# Patient Record
Sex: FEMALE | Race: WHITE | NOT HISPANIC OR LATINO | Employment: OTHER | URBAN - METROPOLITAN AREA
[De-identification: names, ages, dates, MRNs, and addresses within clinical notes are randomized per-mention and may not be internally consistent; named-entity substitution may affect disease eponyms.]

---

## 2017-12-13 LAB
INFLUENZA A ANTIGEN, POC: NEGATIVE
INFLUENZA B ANTIGEN, POC: NEGATIVE
RAPID GROUP A STREP (OHS): POSITIVE

## 2019-10-09 ENCOUNTER — HISTORICAL (OUTPATIENT)
Dept: ADMINISTRATIVE | Facility: HOSPITAL | Age: 49
End: 2019-10-09

## 2020-01-13 LAB
INFLUENZA A ANTIGEN, POC: NEGATIVE
INFLUENZA B ANTIGEN, POC: NEGATIVE
RAPID GROUP A STREP (OHS): NEGATIVE

## 2020-10-05 ENCOUNTER — HISTORICAL (OUTPATIENT)
Dept: ADMINISTRATIVE | Facility: HOSPITAL | Age: 50
End: 2020-10-05

## 2021-01-31 ENCOUNTER — HISTORICAL (OUTPATIENT)
Dept: URGENT CARE | Facility: CLINIC | Age: 51
End: 2021-01-31

## 2021-01-31 LAB
INFLUENZA A ANTIGEN, POC: NEGATIVE
INFLUENZA B ANTIGEN, POC: NEGATIVE

## 2021-05-05 LAB — CRC RECOMMENDATION EXT: NORMAL

## 2021-05-07 ENCOUNTER — HISTORICAL (OUTPATIENT)
Dept: ADMINISTRATIVE | Facility: HOSPITAL | Age: 51
End: 2021-05-07

## 2021-06-10 ENCOUNTER — HISTORICAL (OUTPATIENT)
Dept: ADMINISTRATIVE | Facility: HOSPITAL | Age: 51
End: 2021-06-10

## 2021-06-10 LAB
ABS NEUT (OLG): 1.65 X10(3)/MCL (ref 2.1–9.2)
ALBUMIN SERPL-MCNC: 3.8 GM/DL (ref 3.5–5)
ALBUMIN/GLOB SERPL: 1.1 RATIO (ref 1.1–2)
ALP SERPL-CCNC: 68 UNIT/L (ref 40–150)
ALT SERPL-CCNC: 14 UNIT/L (ref 0–55)
AST SERPL-CCNC: 18 UNIT/L (ref 5–34)
BASOPHILS # BLD AUTO: 0 X10(3)/MCL (ref 0–0.2)
BASOPHILS NFR BLD AUTO: 0.9 %
BILIRUB SERPL-MCNC: 0.5 MG/DL
BILIRUBIN DIRECT+TOT PNL SERPL-MCNC: 0.2 MG/DL (ref 0–0.5)
BILIRUBIN DIRECT+TOT PNL SERPL-MCNC: 0.3 MG/DL (ref 0–0.8)
BUN SERPL-MCNC: 13.4 MG/DL (ref 9.8–20.1)
CALCIUM SERPL-MCNC: 9 MG/DL (ref 8.4–10.2)
CEA SERPL-MCNC: <1.73 NG/ML (ref 0–3)
CHLORIDE SERPL-SCNC: 109 MMOL/L (ref 98–107)
CO2 SERPL-SCNC: 25 MMOL/L (ref 22–29)
CREAT SERPL-MCNC: 1.01 MG/DL (ref 0.55–1.02)
EOSINOPHIL # BLD AUTO: 0 X10(3)/MCL (ref 0–0.9)
EOSINOPHIL NFR BLD AUTO: 0.9 %
ERYTHROCYTE [DISTWIDTH] IN BLOOD BY AUTOMATED COUNT: 20.4 % (ref 11.5–17)
FERRITIN SERPL-MCNC: 6.72 NG/ML (ref 4.63–204)
GLOBULIN SER-MCNC: 3.4 GM/DL (ref 2.4–3.5)
GLUCOSE SERPL-MCNC: 94 MG/DL (ref 74–100)
HCT VFR BLD AUTO: 34.6 % (ref 37–47)
HGB BLD-MCNC: 10.5 GM/DL (ref 12–16)
IRON SATN MFR SERPL: 21 % (ref 20–50)
IRON SERPL-MCNC: 71 UG/DL (ref 50–170)
LYMPHOCYTES # BLD AUTO: 1.3 X10(3)/MCL (ref 0.6–4.6)
LYMPHOCYTES NFR BLD AUTO: 39.9 %
MCH RBC QN AUTO: 23.4 PG (ref 27–31)
MCHC RBC AUTO-ENTMCNC: 30.3 GM/DL (ref 33–36)
MCV RBC AUTO: 77.2 FL (ref 80–94)
MONOCYTES # BLD AUTO: 0.3 X10(3)/MCL (ref 0.1–1.3)
MONOCYTES NFR BLD AUTO: 8.9 %
NEUTROPHILS # BLD AUTO: 1.6 X10(3)/MCL (ref 2.1–9.2)
NEUTROPHILS NFR BLD AUTO: 49.1 %
PLATELET # BLD AUTO: 215 X10(3)/MCL (ref 130–400)
PMV BLD AUTO: 9.3 FL (ref 9.4–12.4)
POTASSIUM SERPL-SCNC: 4.2 MMOL/L (ref 3.5–5.1)
PROT SERPL-MCNC: 7.2 GM/DL (ref 6.4–8.3)
RBC # BLD AUTO: 4.48 X10(6)/MCL (ref 4.2–5.4)
SODIUM SERPL-SCNC: 142 MMOL/L (ref 136–145)
TIBC SERPL-MCNC: 273 UG/DL (ref 70–310)
TIBC SERPL-MCNC: 344 UG/DL (ref 250–450)
TRANSFERRIN SERPL-MCNC: 317 MG/DL (ref 180–382)
WBC # SPEC AUTO: 3.4 X10(3)/MCL (ref 4.5–11.5)

## 2021-06-11 ENCOUNTER — HISTORICAL (OUTPATIENT)
Dept: INFUSION THERAPY | Facility: HOSPITAL | Age: 51
End: 2021-06-11

## 2021-06-15 ENCOUNTER — HISTORICAL (OUTPATIENT)
Dept: ADMINISTRATIVE | Facility: HOSPITAL | Age: 51
End: 2021-06-15

## 2021-06-16 ENCOUNTER — HISTORICAL (OUTPATIENT)
Dept: INFUSION THERAPY | Facility: HOSPITAL | Age: 51
End: 2021-06-16

## 2021-06-17 ENCOUNTER — HISTORICAL (OUTPATIENT)
Dept: ADMINISTRATIVE | Facility: HOSPITAL | Age: 51
End: 2021-06-17

## 2021-06-18 ENCOUNTER — HISTORICAL (OUTPATIENT)
Dept: INFUSION THERAPY | Facility: HOSPITAL | Age: 51
End: 2021-06-18

## 2021-06-22 ENCOUNTER — HISTORICAL (OUTPATIENT)
Dept: ADMINISTRATIVE | Facility: HOSPITAL | Age: 51
End: 2021-06-22

## 2021-06-22 LAB
ABS NEUT (OLG): 2.9 X10(3)/MCL (ref 2.1–9.2)
ALBUMIN SERPL-MCNC: 3.8 GM/DL (ref 3.5–5)
ALBUMIN/GLOB SERPL: 1.2 RATIO (ref 1.1–2)
ALP SERPL-CCNC: 70 UNIT/L (ref 40–150)
ALT SERPL-CCNC: 51 UNIT/L (ref 0–55)
AST SERPL-CCNC: 21 UNIT/L (ref 5–34)
BASOPHILS # BLD AUTO: 0 X10(3)/MCL (ref 0–0.2)
BASOPHILS NFR BLD AUTO: 0 %
BILIRUB SERPL-MCNC: 0.5 MG/DL
BILIRUBIN DIRECT+TOT PNL SERPL-MCNC: 0.2 MG/DL (ref 0–0.5)
BILIRUBIN DIRECT+TOT PNL SERPL-MCNC: 0.3 MG/DL (ref 0–0.8)
BUN SERPL-MCNC: 16.1 MG/DL (ref 9.8–20.1)
CALCIUM SERPL-MCNC: 9.1 MG/DL (ref 8.4–10.2)
CHLORIDE SERPL-SCNC: 104 MMOL/L (ref 98–107)
CO2 SERPL-SCNC: 26 MMOL/L (ref 22–29)
CREAT SERPL-MCNC: 0.83 MG/DL (ref 0.55–1.02)
EOSINOPHIL # BLD AUTO: 0.1 X10(3)/MCL (ref 0–0.9)
EOSINOPHIL NFR BLD AUTO: 2.8 %
ERYTHROCYTE [DISTWIDTH] IN BLOOD BY AUTOMATED COUNT: 22.3 % (ref 11.5–17)
GLOBULIN SER-MCNC: 3.3 GM/DL (ref 2.4–3.5)
GLUCOSE SERPL-MCNC: 109 MG/DL (ref 74–100)
HCT VFR BLD AUTO: 36.7 % (ref 37–47)
HGB BLD-MCNC: 11.7 GM/DL (ref 12–16)
LYMPHOCYTES # BLD AUTO: 1.9 X10(3)/MCL (ref 0.6–4.6)
LYMPHOCYTES NFR BLD AUTO: 37.1 %
MCH RBC QN AUTO: 23.8 PG (ref 27–31)
MCHC RBC AUTO-ENTMCNC: 31.9 GM/DL (ref 33–36)
MCV RBC AUTO: 74.7 FL (ref 80–94)
MONOCYTES # BLD AUTO: 0.1 X10(3)/MCL (ref 0.1–1.3)
MONOCYTES NFR BLD AUTO: 2.2 %
NEUTROPHILS # BLD AUTO: 2.9 X10(3)/MCL (ref 2.1–9.2)
NEUTROPHILS NFR BLD AUTO: 57.9 %
PLATELET # BLD AUTO: 175 X10(3)/MCL (ref 130–400)
PMV BLD AUTO: 9.1 FL (ref 9.4–12.4)
POTASSIUM SERPL-SCNC: 4.7 MMOL/L (ref 3.5–5.1)
PROT SERPL-MCNC: 7.1 GM/DL (ref 6.4–8.3)
RBC # BLD AUTO: 4.91 X10(6)/MCL (ref 4.2–5.4)
SODIUM SERPL-SCNC: 135 MMOL/L (ref 136–145)
WBC # SPEC AUTO: 5 X10(3)/MCL (ref 4.5–11.5)

## 2021-06-28 ENCOUNTER — HISTORICAL (OUTPATIENT)
Dept: URGENT CARE | Facility: CLINIC | Age: 51
End: 2021-06-28

## 2021-06-28 LAB
BILIRUB SERPL-MCNC: NEGATIVE MG/DL
BLOOD URINE, POC: NORMAL
CLARITY, POC UA: NORMAL
COLOR, POC UA: YELLOW
GLUCOSE UR QL STRIP: NEGATIVE
KETONES UR QL STRIP: NORMAL
LEUKOCYTE EST, POC UA: NORMAL
NITRITE, POC UA: NEGATIVE
PH, POC UA: 6
PROTEIN, POC: NORMAL
SPECIFIC GRAVITY, POC UA: 1.02
UROBILINOGEN, POC UA: NORMAL

## 2021-06-30 ENCOUNTER — HISTORICAL (OUTPATIENT)
Dept: INFUSION THERAPY | Facility: HOSPITAL | Age: 51
End: 2021-06-30

## 2021-06-30 LAB
ABS NEUT (OLG): 0.65 X10(3)/MCL (ref 2.1–9.2)
ANION GAP SERPL CALC-SCNC: 19 MMOL/L
BASOPHILS # BLD AUTO: 0 X10(3)/MCL (ref 0–0.2)
BASOPHILS NFR BLD AUTO: 0.6 %
BUN SERPL-MCNC: 9 MG/DL (ref 8–26)
CHLORIDE SERPL-SCNC: 104 MMOL/L (ref 98–109)
CREAT SERPL-MCNC: 0.7 MG/DL (ref 0.6–1.3)
EOSINOPHIL # BLD AUTO: 0 X10(3)/MCL (ref 0–0.9)
EOSINOPHIL NFR BLD AUTO: 1.1 %
ERYTHROCYTE [DISTWIDTH] IN BLOOD BY AUTOMATED COUNT: 24.3 % (ref 11.5–17)
GLUCOSE SERPL-MCNC: 103 MG/DL (ref 70–105)
HCT VFR BLD AUTO: 31.6 % (ref 37–47)
HCT VFR BLD CALC: 32 % (ref 38–51)
HGB BLD-MCNC: 10.9 MG/DL (ref 12–17)
HGB BLD-MCNC: 9.9 GM/DL (ref 12–16)
LYMPHOCYTES # BLD AUTO: 0.9 X10(3)/MCL (ref 0.6–4.6)
LYMPHOCYTES NFR BLD AUTO: 51.4 %
MCH RBC QN AUTO: 24.8 PG (ref 27–31)
MCHC RBC AUTO-ENTMCNC: 31.3 GM/DL (ref 33–36)
MCV RBC AUTO: 79 FL (ref 80–94)
MONOCYTES # BLD AUTO: 0.2 X10(3)/MCL (ref 0.1–1.3)
MONOCYTES NFR BLD AUTO: 10.5 %
NEUTROPHILS # BLD AUTO: 0.6 X10(3)/MCL (ref 2.1–9.2)
NEUTROPHILS NFR BLD AUTO: 35.8 %
PLATELET # BLD AUTO: 96 X10(3)/MCL (ref 130–400)
PMV BLD AUTO: 8.6 FL (ref 9.4–12.4)
POC IONIZED CALCIUM: 1.19 MMOL/L (ref 1.12–1.32)
POC TCO2: 24 MMOL/L (ref 24–29)
POTASSIUM BLD-SCNC: 3.6 MMOL/L (ref 3.5–4.9)
RBC # BLD AUTO: 4 X10(6)/MCL (ref 4.2–5.4)
SODIUM BLD-SCNC: 142 MMOL/L (ref 138–146)
WBC # SPEC AUTO: 1.8 X10(3)/MCL (ref 4.5–11.5)

## 2021-07-01 ENCOUNTER — HISTORICAL (OUTPATIENT)
Dept: INFUSION THERAPY | Facility: HOSPITAL | Age: 51
End: 2021-07-01

## 2021-07-01 LAB
ABS NEUT (OLG): 1.81 X10(3)/MCL (ref 2.1–9.2)
BASOPHILS # BLD AUTO: 0 X10(3)/MCL (ref 0–0.2)
BASOPHILS NFR BLD AUTO: 0.3 %
EOSINOPHIL # BLD AUTO: 0.1 X10(3)/MCL (ref 0–0.9)
EOSINOPHIL NFR BLD AUTO: 1.5 %
ERYTHROCYTE [DISTWIDTH] IN BLOOD BY AUTOMATED COUNT: 25.1 % (ref 11.5–17)
HCT VFR BLD AUTO: 32.3 % (ref 37–47)
HGB BLD-MCNC: 10 GM/DL (ref 12–16)
LYMPHOCYTES # BLD AUTO: 1.4 X10(3)/MCL (ref 0.6–4.6)
LYMPHOCYTES NFR BLD AUTO: 36 %
MCH RBC QN AUTO: 24.8 PG (ref 27–31)
MCHC RBC AUTO-ENTMCNC: 31 GM/DL (ref 33–36)
MCV RBC AUTO: 80.1 FL (ref 80–94)
MONOCYTES # BLD AUTO: 0.3 X10(3)/MCL (ref 0.1–1.3)
MONOCYTES NFR BLD AUTO: 8.5 %
NEUTROPHILS # BLD AUTO: 1.8 X10(3)/MCL (ref 2.1–9.2)
NEUTROPHILS NFR BLD AUTO: 45.2 %
PLATELET # BLD AUTO: 96 X10(3)/MCL (ref 130–400)
PMV BLD AUTO: 8.7 FL (ref 9.4–12.4)
RBC # BLD AUTO: 4.03 X10(6)/MCL (ref 4.2–5.4)
WBC # SPEC AUTO: 4 X10(3)/MCL (ref 4.5–11.5)

## 2021-07-03 ENCOUNTER — HISTORICAL (OUTPATIENT)
Dept: INFUSION THERAPY | Facility: HOSPITAL | Age: 51
End: 2021-07-03

## 2021-07-06 ENCOUNTER — HISTORICAL (OUTPATIENT)
Dept: ADMINISTRATIVE | Facility: HOSPITAL | Age: 51
End: 2021-07-06

## 2021-07-06 LAB
ABS NEUT (OLG): 6.34 X10(3)/MCL (ref 2.1–9.2)
ALBUMIN SERPL-MCNC: 3.7 GM/DL (ref 3.5–5)
ALBUMIN/GLOB SERPL: 1.2 RATIO (ref 1.1–2)
ALP SERPL-CCNC: 100 UNIT/L (ref 40–150)
ALT SERPL-CCNC: 61 UNIT/L (ref 0–55)
AST SERPL-CCNC: 29 UNIT/L (ref 5–34)
BASOPHILS # BLD AUTO: 0 X10(3)/MCL (ref 0–0.2)
BASOPHILS NFR BLD AUTO: 0.1 %
BILIRUB SERPL-MCNC: 0.6 MG/DL
BILIRUBIN DIRECT+TOT PNL SERPL-MCNC: 0.2 MG/DL (ref 0–0.5)
BILIRUBIN DIRECT+TOT PNL SERPL-MCNC: 0.4 MG/DL (ref 0–0.8)
BUN SERPL-MCNC: 18.5 MG/DL (ref 9.8–20.1)
CALCIUM SERPL-MCNC: 8.8 MG/DL (ref 8.4–10.2)
CHLORIDE SERPL-SCNC: 105 MMOL/L (ref 98–107)
CO2 SERPL-SCNC: 23 MMOL/L (ref 22–29)
CREAT SERPL-MCNC: 0.82 MG/DL (ref 0.55–1.02)
EOSINOPHIL # BLD AUTO: 0.1 X10(3)/MCL (ref 0–0.9)
EOSINOPHIL NFR BLD AUTO: 0.8 %
ERYTHROCYTE [DISTWIDTH] IN BLOOD BY AUTOMATED COUNT: 26.1 % (ref 11.5–17)
GLOBULIN SER-MCNC: 3 GM/DL (ref 2.4–3.5)
GLUCOSE SERPL-MCNC: 119 MG/DL (ref 74–100)
HCT VFR BLD AUTO: 35.9 % (ref 37–47)
HGB BLD-MCNC: 11.5 GM/DL (ref 12–16)
LYMPHOCYTES # BLD AUTO: 1.9 X10(3)/MCL (ref 0.6–4.6)
LYMPHOCYTES NFR BLD AUTO: 21.8 %
MCH RBC QN AUTO: 25.3 PG (ref 27–31)
MCHC RBC AUTO-ENTMCNC: 32 GM/DL (ref 33–36)
MCV RBC AUTO: 79.1 FL (ref 80–94)
MONOCYTES # BLD AUTO: 0.3 X10(3)/MCL (ref 0.1–1.3)
MONOCYTES NFR BLD AUTO: 3 %
NEUTROPHILS # BLD AUTO: 6.3 X10(3)/MCL (ref 2.1–9.2)
NEUTROPHILS NFR BLD AUTO: 73.5 %
PLATELET # BLD AUTO: 170 X10(3)/MCL (ref 130–400)
PMV BLD AUTO: 9.4 FL (ref 9.4–12.4)
POTASSIUM SERPL-SCNC: 4.4 MMOL/L (ref 3.5–5.1)
PROT SERPL-MCNC: 6.7 GM/DL (ref 6.4–8.3)
RBC # BLD AUTO: 4.54 X10(6)/MCL (ref 4.2–5.4)
SODIUM SERPL-SCNC: 137 MMOL/L (ref 136–145)
WBC # SPEC AUTO: 8.6 X10(3)/MCL (ref 4.5–11.5)

## 2021-07-14 ENCOUNTER — HISTORICAL (OUTPATIENT)
Dept: INFUSION THERAPY | Facility: HOSPITAL | Age: 51
End: 2021-07-14

## 2021-07-14 LAB
ABS NEUT (OLG): 3.84 X10(3)/MCL (ref 2.1–9.2)
ANION GAP SERPL CALC-SCNC: 19 MMOL/L
BASOPHILS # BLD AUTO: 0 X10(3)/MCL (ref 0–0.2)
BASOPHILS NFR BLD AUTO: 0.3 %
BUN SERPL-MCNC: 6 MG/DL (ref 8–26)
CHLORIDE SERPL-SCNC: 104 MMOL/L (ref 98–109)
CREAT SERPL-MCNC: 0.9 MG/DL (ref 0.6–1.3)
EOSINOPHIL # BLD AUTO: 0 X10(3)/MCL (ref 0–0.9)
EOSINOPHIL NFR BLD AUTO: 0.3 %
ERYTHROCYTE [DISTWIDTH] IN BLOOD BY AUTOMATED COUNT: 29.5 % (ref 11.5–17)
GLUCOSE SERPL-MCNC: 130 MG/DL (ref 70–105)
HCT VFR BLD AUTO: 32.9 % (ref 37–47)
HCT VFR BLD CALC: 33 % (ref 38–51)
HGB BLD-MCNC: 10.4 GM/DL (ref 12–16)
HGB BLD-MCNC: 11.2 MG/DL (ref 12–17)
LYMPHOCYTES # BLD AUTO: 1.8 X10(3)/MCL (ref 0.6–4.6)
LYMPHOCYTES NFR BLD AUTO: 22.5 %
MCH RBC QN AUTO: 26 PG (ref 27–31)
MCHC RBC AUTO-ENTMCNC: 31.6 GM/DL (ref 33–36)
MCV RBC AUTO: 82.3 FL (ref 80–94)
MONOCYTES # BLD AUTO: 0.5 X10(3)/MCL (ref 0.1–1.3)
MONOCYTES NFR BLD AUTO: 6.8 %
NEUTROPHILS # BLD AUTO: 3.8 X10(3)/MCL (ref 2.1–9.2)
NEUTROPHILS NFR BLD AUTO: 48.1 %
PLATELET # BLD AUTO: 87 X10(3)/MCL (ref 130–400)
PMV BLD AUTO: 8.2 FL (ref 9.4–12.4)
POC IONIZED CALCIUM: 1.25 MMOL/L (ref 1.12–1.32)
POC TCO2: 25 MMOL/L (ref 24–29)
POTASSIUM BLD-SCNC: 4.4 MMOL/L (ref 3.5–4.9)
RBC # BLD AUTO: 4 X10(6)/MCL (ref 4.2–5.4)
SODIUM BLD-SCNC: 143 MMOL/L (ref 138–146)
WBC # SPEC AUTO: 8 X10(3)/MCL (ref 4.5–11.5)

## 2021-07-21 ENCOUNTER — HISTORICAL (OUTPATIENT)
Dept: INFUSION THERAPY | Facility: HOSPITAL | Age: 51
End: 2021-07-21

## 2021-07-21 LAB
ABS NEUT (OLG): 2.35 X10(3)/MCL (ref 2.1–9.2)
ANION GAP SERPL CALC-SCNC: 17 MMOL/L
BASOPHILS # BLD AUTO: 0 X10(3)/MCL (ref 0–0.2)
BASOPHILS NFR BLD AUTO: 0.6 %
BUN SERPL-MCNC: 14 MG/DL (ref 8–26)
CHLORIDE SERPL-SCNC: 108 MMOL/L (ref 98–109)
CREAT SERPL-MCNC: 0.9 MG/DL (ref 0.6–1.3)
EOSINOPHIL # BLD AUTO: 0 X10(3)/MCL (ref 0–0.9)
EOSINOPHIL NFR BLD AUTO: 0.6 %
ERYTHROCYTE [DISTWIDTH] IN BLOOD BY AUTOMATED COUNT: 28.9 % (ref 11.5–17)
GLUCOSE SERPL-MCNC: 107 MG/DL (ref 70–105)
HCT VFR BLD AUTO: 34.5 % (ref 37–47)
HCT VFR BLD CALC: 36 % (ref 38–51)
HGB BLD-MCNC: 10.9 GM/DL (ref 12–16)
HGB BLD-MCNC: 12.2 MG/DL (ref 12–17)
LYMPHOCYTES # BLD AUTO: 1.7 X10(3)/MCL (ref 0.6–4.6)
LYMPHOCYTES NFR BLD AUTO: 34.7 %
MCH RBC QN AUTO: 26.5 PG (ref 27–31)
MCHC RBC AUTO-ENTMCNC: 31.6 GM/DL (ref 33–36)
MCV RBC AUTO: 83.7 FL (ref 80–94)
MONOCYTES # BLD AUTO: 0.5 X10(3)/MCL (ref 0.1–1.3)
MONOCYTES NFR BLD AUTO: 10.4 %
NEUTROPHILS # BLD AUTO: 2.4 X10(3)/MCL (ref 2.1–9.2)
NEUTROPHILS NFR BLD AUTO: 48.9 %
PLATELET # BLD AUTO: 113 X10(3)/MCL (ref 130–400)
PMV BLD AUTO: 8.6 FL (ref 9.4–12.4)
POC IONIZED CALCIUM: 1.25 MMOL/L (ref 1.12–1.32)
POC TCO2: 23 MMOL/L (ref 24–29)
POTASSIUM BLD-SCNC: 4.2 MMOL/L (ref 3.5–4.9)
RBC # BLD AUTO: 4.12 X10(6)/MCL (ref 4.2–5.4)
SODIUM BLD-SCNC: 142 MMOL/L (ref 138–146)
WBC # SPEC AUTO: 4.8 X10(3)/MCL (ref 4.5–11.5)

## 2021-07-23 ENCOUNTER — HISTORICAL (OUTPATIENT)
Dept: INFUSION THERAPY | Facility: HOSPITAL | Age: 51
End: 2021-07-23

## 2021-07-28 ENCOUNTER — HISTORICAL (OUTPATIENT)
Dept: ADMINISTRATIVE | Facility: HOSPITAL | Age: 51
End: 2021-07-28

## 2021-07-28 LAB
ABS NEUT (OLG): 6.65 X10(3)/MCL (ref 2.1–9.2)
ALBUMIN SERPL-MCNC: 3.9 GM/DL (ref 3.5–5)
ALBUMIN/GLOB SERPL: 1.3 RATIO (ref 1.1–2)
ALP SERPL-CCNC: 189 UNIT/L (ref 40–150)
ALT SERPL-CCNC: 48 UNIT/L (ref 0–55)
AST SERPL-CCNC: 27 UNIT/L (ref 5–34)
BASOPHILS # BLD AUTO: 0 X10(3)/MCL (ref 0–0.2)
BASOPHILS NFR BLD AUTO: 0.4 %
BILIRUB SERPL-MCNC: 0.3 MG/DL
BILIRUBIN DIRECT+TOT PNL SERPL-MCNC: 0.1 MG/DL (ref 0–0.5)
BILIRUBIN DIRECT+TOT PNL SERPL-MCNC: 0.2 MG/DL (ref 0–0.8)
BUN SERPL-MCNC: 16.7 MG/DL (ref 9.8–20.1)
CALCIUM SERPL-MCNC: 9.1 MG/DL (ref 8.4–10.2)
CHLORIDE SERPL-SCNC: 106 MMOL/L (ref 98–107)
CO2 SERPL-SCNC: 26 MMOL/L (ref 22–29)
CREAT SERPL-MCNC: 0.87 MG/DL (ref 0.55–1.02)
EOSINOPHIL # BLD AUTO: 0 X10(3)/MCL (ref 0–0.9)
EOSINOPHIL NFR BLD AUTO: 0.4 %
ERYTHROCYTE [DISTWIDTH] IN BLOOD BY AUTOMATED COUNT: 28.8 % (ref 11.5–17)
GLOBULIN SER-MCNC: 2.9 GM/DL (ref 2.4–3.5)
GLUCOSE SERPL-MCNC: 101 MG/DL (ref 74–100)
HCT VFR BLD AUTO: 34.7 % (ref 37–47)
HGB BLD-MCNC: 11.1 GM/DL (ref 12–16)
LYMPHOCYTES # BLD AUTO: 2 X10(3)/MCL (ref 0.6–4.6)
LYMPHOCYTES NFR BLD AUTO: 19.4 %
MCH RBC QN AUTO: 27.5 PG (ref 27–31)
MCHC RBC AUTO-ENTMCNC: 32 GM/DL (ref 33–36)
MCV RBC AUTO: 85.9 FL (ref 80–94)
MONOCYTES # BLD AUTO: 1.3 X10(3)/MCL (ref 0.1–1.3)
MONOCYTES NFR BLD AUTO: 12.4 %
NEUTROPHILS # BLD AUTO: 6.6 X10(3)/MCL (ref 2.1–9.2)
NEUTROPHILS NFR BLD AUTO: 65.7 %
PLATELET # BLD AUTO: 156 X10(3)/MCL (ref 130–400)
PMV BLD AUTO: 9.5 FL (ref 9.4–12.4)
POTASSIUM SERPL-SCNC: 4.3 MMOL/L (ref 3.5–5.1)
PROT SERPL-MCNC: 6.8 GM/DL (ref 6.4–8.3)
RBC # BLD AUTO: 4.04 X10(6)/MCL (ref 4.2–5.4)
SODIUM SERPL-SCNC: 142 MMOL/L (ref 136–145)
WBC # SPEC AUTO: 10.1 X10(3)/MCL (ref 4.5–11.5)

## 2021-08-04 ENCOUNTER — HISTORICAL (OUTPATIENT)
Dept: INFUSION THERAPY | Facility: HOSPITAL | Age: 51
End: 2021-08-04

## 2021-08-04 LAB
ABS NEUT (OLG): 4.59 X10(3)/MCL (ref 2.1–9.2)
ALBUMIN SERPL-MCNC: 3.5 GM/DL (ref 3.5–5)
ALP SERPL-CCNC: 141 UNIT/L (ref 40–150)
ALT SERPL-CCNC: 110 UNIT/L (ref 0–55)
ANION GAP SERPL CALC-SCNC: 20 MMOL/L
AST SERPL-CCNC: 81 UNIT/L (ref 5–34)
BASOPHILS # BLD AUTO: 0 X10(3)/MCL (ref 0–0.2)
BASOPHILS NFR BLD AUTO: 0.1 %
BILIRUB SERPL-MCNC: 0.3 MG/DL
BILIRUBIN DIRECT+TOT PNL SERPL-MCNC: 0.1 MG/DL (ref 0–0.5)
BILIRUBIN DIRECT+TOT PNL SERPL-MCNC: 0.2 MG/DL (ref 0–0.8)
BUN SERPL-MCNC: 9 MG/DL (ref 8–26)
CHLORIDE SERPL-SCNC: 105 MMOL/L (ref 98–109)
CREAT SERPL-MCNC: 0.8 MG/DL (ref 0.6–1.3)
EOSINOPHIL # BLD AUTO: 0.1 X10(3)/MCL (ref 0–0.9)
EOSINOPHIL NFR BLD AUTO: 0.9 %
ERYTHROCYTE [DISTWIDTH] IN BLOOD BY AUTOMATED COUNT: 28.5 % (ref 11.5–17)
GLUCOSE SERPL-MCNC: 118 MG/DL (ref 70–105)
HCT VFR BLD AUTO: 34.5 % (ref 37–47)
HCT VFR BLD CALC: 35 % (ref 38–51)
HGB BLD-MCNC: 11.1 GM/DL (ref 12–16)
HGB BLD-MCNC: 11.9 MG/DL (ref 12–17)
LIVER PROFILE INTERP: ABNORMAL
LYMPHOCYTES # BLD AUTO: 2 X10(3)/MCL (ref 0.6–4.6)
LYMPHOCYTES NFR BLD AUTO: 25.7 %
MCH RBC QN AUTO: 28.2 PG (ref 27–31)
MCHC RBC AUTO-ENTMCNC: 32.2 GM/DL (ref 33–36)
MCV RBC AUTO: 87.8 FL (ref 80–94)
MONOCYTES # BLD AUTO: 0.5 X10(3)/MCL (ref 0.1–1.3)
MONOCYTES NFR BLD AUTO: 6.1 %
NEUTROPHILS # BLD AUTO: 4.6 X10(3)/MCL (ref 2.1–9.2)
NEUTROPHILS NFR BLD AUTO: 58.8 %
PLATELET # BLD AUTO: 105 X10(3)/MCL (ref 130–400)
PMV BLD AUTO: 8.8 FL (ref 9.4–12.4)
POC IONIZED CALCIUM: 1.22 MMOL/L (ref 1.12–1.32)
POC TCO2: 24 MMOL/L (ref 24–29)
POTASSIUM BLD-SCNC: 3.7 MMOL/L (ref 3.5–4.9)
PROT SERPL-MCNC: 6.3 GM/DL (ref 6.4–8.3)
RBC # BLD AUTO: 3.93 X10(6)/MCL (ref 4.2–5.4)
SODIUM BLD-SCNC: 144 MMOL/L (ref 138–146)
WBC # SPEC AUTO: 7.8 X10(3)/MCL (ref 4.5–11.5)

## 2021-08-06 ENCOUNTER — HISTORICAL (OUTPATIENT)
Dept: INFUSION THERAPY | Facility: HOSPITAL | Age: 51
End: 2021-08-06

## 2021-08-11 ENCOUNTER — HISTORICAL (OUTPATIENT)
Dept: ADMINISTRATIVE | Facility: HOSPITAL | Age: 51
End: 2021-08-11

## 2021-08-11 LAB
ABS NEUT (OLG): 6.74 X10(3)/MCL (ref 2.1–9.2)
ALBUMIN SERPL-MCNC: 4 GM/DL (ref 3.5–5)
ALBUMIN/GLOB SERPL: 1.5 RATIO (ref 1.1–2)
ALP SERPL-CCNC: 197 UNIT/L (ref 40–150)
ALT SERPL-CCNC: 115 UNIT/L (ref 0–55)
ANISOCYTOSIS BLD QL SMEAR: 1
AST SERPL-CCNC: 70 UNIT/L (ref 5–34)
BASOPHILS # BLD AUTO: 0 X10(3)/MCL (ref 0–0.2)
BASOPHILS NFR BLD AUTO: 0.2 %
BILIRUB SERPL-MCNC: 0.3 MG/DL
BILIRUBIN DIRECT+TOT PNL SERPL-MCNC: 0.1 MG/DL (ref 0–0.5)
BILIRUBIN DIRECT+TOT PNL SERPL-MCNC: 0.2 MG/DL (ref 0–0.8)
BUN SERPL-MCNC: 16.2 MG/DL (ref 9.8–20.1)
CALCIUM SERPL-MCNC: 9.1 MG/DL (ref 8.4–10.2)
CHLORIDE SERPL-SCNC: 105 MMOL/L (ref 98–107)
CO2 SERPL-SCNC: 24 MMOL/L (ref 22–29)
CREAT SERPL-MCNC: 0.81 MG/DL (ref 0.55–1.02)
DACRYOCYTES BLD QL SMEAR: 1 /MCL (ref 0–1)
EOSINOPHIL # BLD AUTO: 0 X10(3)/MCL (ref 0–0.9)
EOSINOPHIL NFR BLD AUTO: 0.5 %
ERYTHROCYTE [DISTWIDTH] IN BLOOD BY AUTOMATED COUNT: 25.7 % (ref 11.5–17)
GLOBULIN SER-MCNC: 2.6 GM/DL (ref 2.4–3.5)
GLUCOSE SERPL-MCNC: 115 MG/DL (ref 74–100)
HCT VFR BLD AUTO: 35.4 % (ref 37–47)
HGB BLD-MCNC: 11.5 GM/DL (ref 12–16)
HYPOCHROMIA BLD QL SMEAR: 0
LYMPHOCYTES # BLD AUTO: 1.4 X10(3)/MCL (ref 0.6–4.6)
LYMPHOCYTES NFR BLD AUTO: 16.4 %
LYMPHOCYTES NFR BLD MANUAL: 13 % (ref 13–40)
MACROCYTES BLD QL SMEAR: 1
MCH RBC QN AUTO: 28.6 PG (ref 27–31)
MCHC RBC AUTO-ENTMCNC: 32.5 GM/DL (ref 33–36)
MCV RBC AUTO: 88.1 FL (ref 80–94)
MICROCYTES BLD QL SMEAR: 0
MONOCYTES # BLD AUTO: 0.5 X10(3)/MCL (ref 0.1–1.3)
MONOCYTES NFR BLD AUTO: 5.4 %
MONOCYTES NFR BLD MANUAL: 4 % (ref 2–11)
MYELOCYTES NFR BLD MANUAL: 1 %
NEUTROPHILS # BLD AUTO: 6.7 X10(3)/MCL (ref 2.1–9.2)
NEUTROPHILS NFR BLD AUTO: 76.5 %
NEUTROPHILS NFR BLD MANUAL: 82 % (ref 47–80)
PLATELET # BLD AUTO: 73 X10(3)/MCL (ref 130–400)
PLATELET # BLD EST: ABNORMAL 10*3/UL
PMV BLD AUTO: 9.3 FL (ref 9.4–12.4)
POIKILOCYTOSIS BLD QL SMEAR: 1
POLYCHROMASIA BLD QL SMEAR: 0
POTASSIUM SERPL-SCNC: 3.7 MMOL/L (ref 3.5–5.1)
PROT SERPL-MCNC: 6.6 GM/DL (ref 6.4–8.3)
RBC # BLD AUTO: 4.02 X10(6)/MCL (ref 4.2–5.4)
SCHISTOCYTES BLD QL AUTO: 0
SODIUM SERPL-SCNC: 140 MMOL/L (ref 136–145)
SPHEROCYTES BLD QL SMEAR: 0
TARGETS BLD QL SMEAR: 0
WBC # SPEC AUTO: 8.8 X10(3)/MCL (ref 4.5–11.5)

## 2021-08-18 ENCOUNTER — HISTORICAL (OUTPATIENT)
Dept: INFUSION THERAPY | Facility: HOSPITAL | Age: 51
End: 2021-08-18

## 2021-08-18 LAB
ABS NEUT (OLG): 4.26 X10(3)/MCL (ref 2.1–9.2)
ANION GAP SERPL CALC-SCNC: 16 MMOL/L
BASOPHILS # BLD AUTO: 0 X10(3)/MCL (ref 0–0.2)
BASOPHILS NFR BLD AUTO: 0.1 %
BUN SERPL-MCNC: 13 MG/DL (ref 8–26)
CHLORIDE SERPL-SCNC: 109 MMOL/L (ref 98–109)
CREAT SERPL-MCNC: 0.8 MG/DL (ref 0.6–1.3)
EOSINOPHIL # BLD AUTO: 0.1 X10(3)/MCL (ref 0–0.9)
EOSINOPHIL NFR BLD AUTO: 1 %
ERYTHROCYTE [DISTWIDTH] IN BLOOD BY AUTOMATED COUNT: 24.6 % (ref 11.5–17)
GLUCOSE SERPL-MCNC: 148 MG/DL (ref 70–105)
HCT VFR BLD AUTO: 36.5 % (ref 37–47)
HCT VFR BLD CALC: 36 % (ref 38–51)
HGB BLD-MCNC: 11.6 GM/DL (ref 12–16)
HGB BLD-MCNC: 12.2 MG/DL (ref 12–17)
LYMPHOCYTES # BLD AUTO: 1.7 X10(3)/MCL (ref 0.6–4.6)
LYMPHOCYTES NFR BLD AUTO: 23 %
MCH RBC QN AUTO: 29.4 PG (ref 27–31)
MCHC RBC AUTO-ENTMCNC: 31.8 GM/DL (ref 33–36)
MCV RBC AUTO: 92.6 FL (ref 80–94)
MONOCYTES # BLD AUTO: 0.6 X10(3)/MCL (ref 0.1–1.3)
MONOCYTES NFR BLD AUTO: 7.5 %
NEUTROPHILS # BLD AUTO: 4.3 X10(3)/MCL (ref 2.1–9.2)
NEUTROPHILS NFR BLD AUTO: 58.3 %
PLATELET # BLD AUTO: 89 X10(3)/MCL (ref 130–400)
PMV BLD AUTO: 9.4 FL (ref 9.4–12.4)
POC IONIZED CALCIUM: 1.18 MMOL/L (ref 1.12–1.32)
POC TCO2: 22 MMOL/L (ref 24–29)
POTASSIUM BLD-SCNC: 3.7 MMOL/L (ref 3.5–4.9)
RBC # BLD AUTO: 3.94 X10(6)/MCL (ref 4.2–5.4)
SODIUM BLD-SCNC: 142 MMOL/L (ref 138–146)
WBC # SPEC AUTO: 7.3 X10(3)/MCL (ref 4.5–11.5)

## 2021-08-25 ENCOUNTER — HISTORICAL (OUTPATIENT)
Dept: INFUSION THERAPY | Facility: HOSPITAL | Age: 51
End: 2021-08-25

## 2021-08-25 LAB
ABS NEUT (OLG): 1.91 X10(3)/MCL (ref 2.1–9.2)
ALBUMIN SERPL-MCNC: 3.8 GM/DL (ref 3.5–5)
ALBUMIN/GLOB SERPL: 1.4 RATIO (ref 1.1–2)
ALP SERPL-CCNC: 125 UNIT/L (ref 40–150)
ALT SERPL-CCNC: 60 UNIT/L (ref 0–55)
AST SERPL-CCNC: 38 UNIT/L (ref 5–34)
BASOPHILS # BLD AUTO: 0 X10(3)/MCL (ref 0–0.2)
BASOPHILS NFR BLD AUTO: 0.3 %
BILIRUB SERPL-MCNC: 0.3 MG/DL
BILIRUBIN DIRECT+TOT PNL SERPL-MCNC: 0.1 MG/DL (ref 0–0.5)
BILIRUBIN DIRECT+TOT PNL SERPL-MCNC: 0.2 MG/DL (ref 0–0.8)
BUN SERPL-MCNC: 11.3 MG/DL (ref 9.8–20.1)
CALCIUM SERPL-MCNC: 9.3 MG/DL (ref 8.4–10.2)
CHLORIDE SERPL-SCNC: 108 MMOL/L (ref 98–107)
CO2 SERPL-SCNC: 24 MMOL/L (ref 22–29)
CREAT SERPL-MCNC: 0.89 MG/DL (ref 0.55–1.02)
EOSINOPHIL # BLD AUTO: 0 X10(3)/MCL (ref 0–0.9)
EOSINOPHIL NFR BLD AUTO: 1.1 %
ERYTHROCYTE [DISTWIDTH] IN BLOOD BY AUTOMATED COUNT: 21.7 % (ref 11.5–17)
GLOBULIN SER-MCNC: 2.8 GM/DL (ref 2.4–3.5)
GLUCOSE SERPL-MCNC: 138 MG/DL (ref 74–100)
HCT VFR BLD AUTO: 37.1 % (ref 37–47)
HGB BLD-MCNC: 11.8 GM/DL (ref 12–16)
LYMPHOCYTES # BLD AUTO: 1.3 X10(3)/MCL (ref 0.6–4.6)
LYMPHOCYTES NFR BLD AUTO: 34.6 %
MCH RBC QN AUTO: 30.1 PG (ref 27–31)
MCHC RBC AUTO-ENTMCNC: 31.8 GM/DL (ref 33–36)
MCV RBC AUTO: 94.6 FL (ref 80–94)
MONOCYTES # BLD AUTO: 0.4 X10(3)/MCL (ref 0.1–1.3)
MONOCYTES NFR BLD AUTO: 11.5 %
NEUTROPHILS # BLD AUTO: 1.9 X10(3)/MCL (ref 2.1–9.2)
NEUTROPHILS NFR BLD AUTO: 51.2 %
PLATELET # BLD AUTO: 125 X10(3)/MCL (ref 130–400)
PMV BLD AUTO: 9.1 FL (ref 9.4–12.4)
POTASSIUM SERPL-SCNC: 4.4 MMOL/L (ref 3.5–5.1)
PROT SERPL-MCNC: 6.6 GM/DL (ref 6.4–8.3)
RBC # BLD AUTO: 3.92 X10(6)/MCL (ref 4.2–5.4)
SODIUM SERPL-SCNC: 139 MMOL/L (ref 136–145)
WBC # SPEC AUTO: 3.7 X10(3)/MCL (ref 4.5–11.5)

## 2021-08-27 ENCOUNTER — HISTORICAL (OUTPATIENT)
Dept: INFUSION THERAPY | Facility: HOSPITAL | Age: 51
End: 2021-08-27

## 2021-09-01 ENCOUNTER — HISTORICAL (OUTPATIENT)
Dept: HEMATOLOGY/ONCOLOGY | Facility: CLINIC | Age: 51
End: 2021-09-01

## 2021-09-01 LAB
ABS NEUT (OLG): 7.43 X10(3)/MCL (ref 2.1–9.2)
ALBUMIN SERPL-MCNC: 3.8 GM/DL (ref 3.5–5)
ALBUMIN/GLOB SERPL: 1.4 RATIO (ref 1.1–2)
ALP SERPL-CCNC: 183 UNIT/L (ref 40–150)
ALT SERPL-CCNC: 52 UNIT/L (ref 0–55)
AST SERPL-CCNC: 38 UNIT/L (ref 5–34)
BASOPHILS # BLD AUTO: 0 X10(3)/MCL (ref 0–0.2)
BASOPHILS NFR BLD AUTO: 0.2 %
BILIRUB SERPL-MCNC: 0.3 MG/DL
BILIRUBIN DIRECT+TOT PNL SERPL-MCNC: 0.1 MG/DL (ref 0–0.5)
BILIRUBIN DIRECT+TOT PNL SERPL-MCNC: 0.2 MG/DL (ref 0–0.8)
BUN SERPL-MCNC: 17.5 MG/DL (ref 9.8–20.1)
CALCIUM SERPL-MCNC: 9.1 MG/DL (ref 8.4–10.2)
CHLORIDE SERPL-SCNC: 108 MMOL/L (ref 98–107)
CO2 SERPL-SCNC: 24 MMOL/L (ref 22–29)
CREAT SERPL-MCNC: 0.76 MG/DL (ref 0.55–1.02)
EOSINOPHIL # BLD AUTO: 0.1 X10(3)/MCL (ref 0–0.9)
EOSINOPHIL NFR BLD AUTO: 0.9 %
ERYTHROCYTE [DISTWIDTH] IN BLOOD BY AUTOMATED COUNT: 19.4 % (ref 11.5–17)
GLOBULIN SER-MCNC: 2.7 GM/DL (ref 2.4–3.5)
GLUCOSE SERPL-MCNC: 90 MG/DL (ref 74–100)
HCT VFR BLD AUTO: 36.5 % (ref 37–47)
HGB BLD-MCNC: 11.8 GM/DL (ref 12–16)
LYMPHOCYTES # BLD AUTO: 2.2 X10(3)/MCL (ref 0.6–4.6)
LYMPHOCYTES NFR BLD AUTO: 19.3 %
MCH RBC QN AUTO: 30.6 PG (ref 27–31)
MCHC RBC AUTO-ENTMCNC: 32.3 GM/DL (ref 33–36)
MCV RBC AUTO: 94.8 FL (ref 80–94)
MONOCYTES # BLD AUTO: 1.4 X10(3)/MCL (ref 0.1–1.3)
MONOCYTES NFR BLD AUTO: 12.1 %
NEUTROPHILS # BLD AUTO: 7.4 X10(3)/MCL (ref 2.1–9.2)
NEUTROPHILS NFR BLD AUTO: 66.4 %
PLATELET # BLD AUTO: 98 X10(3)/MCL (ref 130–400)
PMV BLD AUTO: 9.8 FL (ref 9.4–12.4)
POTASSIUM SERPL-SCNC: 3.9 MMOL/L (ref 3.5–5.1)
PROT SERPL-MCNC: 6.5 GM/DL (ref 6.4–8.3)
RBC # BLD AUTO: 3.85 X10(6)/MCL (ref 4.2–5.4)
SODIUM SERPL-SCNC: 145 MMOL/L (ref 136–145)
WBC # SPEC AUTO: 11.2 X10(3)/MCL (ref 4.5–11.5)

## 2021-09-08 ENCOUNTER — HISTORICAL (OUTPATIENT)
Dept: ADMINISTRATIVE | Facility: HOSPITAL | Age: 51
End: 2021-09-08

## 2021-09-08 LAB
ABS NEUT (OLG): 4.03 X10(3)/MCL (ref 2.1–9.2)
ANION GAP SERPL CALC-SCNC: 18 MMOL/L
BASOPHILS # BLD AUTO: 0 X10(3)/MCL (ref 0–0.2)
BASOPHILS NFR BLD AUTO: 0.2 %
BUN SERPL-MCNC: 7 MG/DL (ref 8–26)
CHLORIDE SERPL-SCNC: 107 MMOL/L (ref 98–109)
CREAT SERPL-MCNC: 0.8 MG/DL (ref 0.6–1.3)
EOSINOPHIL # BLD AUTO: 0.1 X10(3)/MCL (ref 0–0.9)
EOSINOPHIL NFR BLD AUTO: 0.9 %
ERYTHROCYTE [DISTWIDTH] IN BLOOD BY AUTOMATED COUNT: 18.5 % (ref 11.5–17)
GLUCOSE SERPL-MCNC: 89 MG/DL (ref 70–105)
HCT VFR BLD AUTO: 37.3 % (ref 37–47)
HCT VFR BLD CALC: 36 % (ref 38–51)
HGB BLD-MCNC: 11.8 GM/DL (ref 12–16)
HGB BLD-MCNC: 12.2 MG/DL (ref 12–17)
LYMPHOCYTES # BLD AUTO: 1.5 X10(3)/MCL (ref 0.6–4.6)
LYMPHOCYTES NFR BLD AUTO: 22.9 %
MCH RBC QN AUTO: 31.1 PG (ref 27–31)
MCHC RBC AUTO-ENTMCNC: 31.6 GM/DL (ref 33–36)
MCV RBC AUTO: 98.2 FL (ref 80–94)
MONOCYTES # BLD AUTO: 0.4 X10(3)/MCL (ref 0.1–1.3)
MONOCYTES NFR BLD AUTO: 6.9 %
NEUTROPHILS # BLD AUTO: 4 X10(3)/MCL (ref 2.1–9.2)
NEUTROPHILS NFR BLD AUTO: 61.7 %
PLATELET # BLD AUTO: 87 X10(3)/MCL (ref 130–400)
PMV BLD AUTO: 9.7 FL (ref 9.4–12.4)
POC IONIZED CALCIUM: 1.27 MMOL/L (ref 1.12–1.32)
POC TCO2: 22 MMOL/L (ref 24–29)
POTASSIUM BLD-SCNC: 4.2 MMOL/L (ref 3.5–4.9)
RBC # BLD AUTO: 3.8 X10(6)/MCL (ref 4.2–5.4)
SODIUM BLD-SCNC: 142 MMOL/L (ref 138–146)
WBC # SPEC AUTO: 6.5 X10(3)/MCL (ref 4.5–11.5)

## 2021-09-15 ENCOUNTER — HISTORICAL (OUTPATIENT)
Dept: INFUSION THERAPY | Facility: HOSPITAL | Age: 51
End: 2021-09-15

## 2021-09-15 LAB
ABS NEUT (OLG): 1.53 X10(3)/MCL (ref 2.1–9.2)
ALBUMIN SERPL-MCNC: 3.7 GM/DL (ref 3.5–5)
ALBUMIN/GLOB SERPL: 1.3 RATIO (ref 1.1–2)
ALP SERPL-CCNC: 117 UNIT/L (ref 40–150)
ALT SERPL-CCNC: 77 UNIT/L (ref 0–55)
AST SERPL-CCNC: 56 UNIT/L (ref 5–34)
BASOPHILS # BLD AUTO: 0 X10(3)/MCL (ref 0–0.2)
BASOPHILS NFR BLD AUTO: 0.3 %
BILIRUB SERPL-MCNC: 0.3 MG/DL
BILIRUBIN DIRECT+TOT PNL SERPL-MCNC: 0.1 MG/DL (ref 0–0.5)
BILIRUBIN DIRECT+TOT PNL SERPL-MCNC: 0.2 MG/DL (ref 0–0.8)
BUN SERPL-MCNC: 13.2 MG/DL (ref 9.8–20.1)
CALCIUM SERPL-MCNC: 8.9 MG/DL (ref 8.4–10.2)
CHLORIDE SERPL-SCNC: 110 MMOL/L (ref 98–107)
CO2 SERPL-SCNC: 25 MMOL/L (ref 22–29)
CREAT SERPL-MCNC: 0.81 MG/DL (ref 0.55–1.02)
EOSINOPHIL # BLD AUTO: 0.1 X10(3)/MCL (ref 0–0.9)
EOSINOPHIL NFR BLD AUTO: 1.8 %
ERYTHROCYTE [DISTWIDTH] IN BLOOD BY AUTOMATED COUNT: 17.1 % (ref 11.5–17)
GLOBULIN SER-MCNC: 2.8 GM/DL (ref 2.4–3.5)
GLUCOSE SERPL-MCNC: 109 MG/DL (ref 74–100)
HCT VFR BLD AUTO: 37.7 % (ref 37–47)
HGB BLD-MCNC: 12.2 GM/DL (ref 12–16)
LYMPHOCYTES # BLD AUTO: 1.3 X10(3)/MCL (ref 0.6–4.6)
LYMPHOCYTES NFR BLD AUTO: 39.4 %
MCH RBC QN AUTO: 31.8 PG (ref 27–31)
MCHC RBC AUTO-ENTMCNC: 32.4 GM/DL (ref 33–36)
MCV RBC AUTO: 98.2 FL (ref 80–94)
MONOCYTES # BLD AUTO: 0.4 X10(3)/MCL (ref 0.1–1.3)
MONOCYTES NFR BLD AUTO: 12.6 %
NEUTROPHILS # BLD AUTO: 1.5 X10(3)/MCL (ref 2.1–9.2)
NEUTROPHILS NFR BLD AUTO: 45 %
PLATELET # BLD AUTO: 121 X10(3)/MCL (ref 130–400)
PMV BLD AUTO: 9.2 FL (ref 9.4–12.4)
POTASSIUM SERPL-SCNC: 4.7 MMOL/L (ref 3.5–5.1)
PROT SERPL-MCNC: 6.5 GM/DL (ref 6.4–8.3)
RBC # BLD AUTO: 3.84 X10(6)/MCL (ref 4.2–5.4)
SODIUM SERPL-SCNC: 143 MMOL/L (ref 136–145)
WBC # SPEC AUTO: 3.4 X10(3)/MCL (ref 4.5–11.5)

## 2021-09-17 ENCOUNTER — HISTORICAL (OUTPATIENT)
Dept: INFUSION THERAPY | Facility: HOSPITAL | Age: 51
End: 2021-09-17

## 2021-09-23 ENCOUNTER — HISTORICAL (OUTPATIENT)
Dept: HEMATOLOGY/ONCOLOGY | Facility: CLINIC | Age: 51
End: 2021-09-23

## 2021-09-23 LAB
ABS NEUT (OLG): 6.77 X10(3)/MCL (ref 2.1–9.2)
ALBUMIN SERPL-MCNC: 3.9 GM/DL (ref 3.5–5)
ALBUMIN/GLOB SERPL: 1.4 RATIO (ref 1.1–2)
ALP SERPL-CCNC: 184 UNIT/L (ref 40–150)
ALT SERPL-CCNC: 92 UNIT/L (ref 0–55)
AST SERPL-CCNC: 53 UNIT/L (ref 5–34)
BASOPHILS # BLD AUTO: 0 X10(3)/MCL (ref 0–0.2)
BASOPHILS NFR BLD AUTO: 0.2 %
BILIRUB SERPL-MCNC: 0.3 MG/DL
BILIRUBIN DIRECT+TOT PNL SERPL-MCNC: 0.1 MG/DL (ref 0–0.5)
BILIRUBIN DIRECT+TOT PNL SERPL-MCNC: 0.2 MG/DL (ref 0–0.8)
BUN SERPL-MCNC: 16.1 MG/DL (ref 9.8–20.1)
CALCIUM SERPL-MCNC: 9 MG/DL (ref 8.4–10.2)
CHLORIDE SERPL-SCNC: 108 MMOL/L (ref 98–107)
CO2 SERPL-SCNC: 23 MMOL/L (ref 22–29)
CREAT SERPL-MCNC: 0.95 MG/DL (ref 0.55–1.02)
EOSINOPHIL # BLD AUTO: 0.1 X10(3)/MCL (ref 0–0.9)
EOSINOPHIL NFR BLD AUTO: 1.2 %
ERYTHROCYTE [DISTWIDTH] IN BLOOD BY AUTOMATED COUNT: 15.6 % (ref 11.5–17)
FOLATE SERPL-MCNC: 14.8 NG/ML (ref 7–31.4)
GLOBULIN SER-MCNC: 2.8 GM/DL (ref 2.4–3.5)
GLUCOSE SERPL-MCNC: 98 MG/DL (ref 74–100)
HCT VFR BLD AUTO: 38.8 % (ref 37–47)
HGB BLD-MCNC: 12.4 GM/DL (ref 12–16)
LYMPHOCYTES # BLD AUTO: 1.6 X10(3)/MCL (ref 0.6–4.6)
LYMPHOCYTES NFR BLD AUTO: 15.2 %
MCH RBC QN AUTO: 32 PG (ref 27–31)
MCHC RBC AUTO-ENTMCNC: 32 GM/DL (ref 33–36)
MCV RBC AUTO: 100 FL (ref 80–94)
MONOCYTES # BLD AUTO: 1.1 X10(3)/MCL (ref 0.1–1.3)
MONOCYTES NFR BLD AUTO: 10.8 %
NEUTROPHILS # BLD AUTO: 6.8 X10(3)/MCL (ref 2.1–9.2)
NEUTROPHILS NFR BLD AUTO: 66.6 %
PLATELET # BLD AUTO: 99 X10(3)/MCL (ref 130–400)
PMV BLD AUTO: 8.7 FL (ref 9.4–12.4)
POTASSIUM SERPL-SCNC: 4.5 MMOL/L (ref 3.5–5.1)
PROT SERPL-MCNC: 6.7 GM/DL (ref 6.4–8.3)
RBC # BLD AUTO: 3.88 X10(6)/MCL (ref 4.2–5.4)
SODIUM SERPL-SCNC: 142 MMOL/L (ref 136–145)
VIT B12 SERPL-MCNC: >2000 PG/ML (ref 213–816)
WBC # SPEC AUTO: 10.2 X10(3)/MCL (ref 4.5–11.5)

## 2021-09-29 ENCOUNTER — HISTORICAL (OUTPATIENT)
Dept: INFUSION THERAPY | Facility: HOSPITAL | Age: 51
End: 2021-09-29

## 2021-09-29 LAB
ABS NEUT (OLG): 4.14 X10(3)/MCL (ref 2.1–9.2)
ALBUMIN SERPL-MCNC: 3.9 GM/DL (ref 3.5–5)
ALP SERPL-CCNC: 142 UNIT/L (ref 40–150)
ALT SERPL-CCNC: 62 UNIT/L (ref 0–55)
ANION GAP SERPL CALC-SCNC: 18 MMOL/L
AST SERPL-CCNC: 39 UNIT/L (ref 5–34)
BASOPHILS # BLD AUTO: 0 X10(3)/MCL (ref 0–0.2)
BASOPHILS NFR BLD AUTO: 0.3 %
BILIRUB SERPL-MCNC: 0.3 MG/DL
BILIRUBIN DIRECT+TOT PNL SERPL-MCNC: 0.1 MG/DL (ref 0–0.5)
BILIRUBIN DIRECT+TOT PNL SERPL-MCNC: 0.2 MG/DL (ref 0–0.8)
BUN SERPL-MCNC: 13 MG/DL (ref 8–26)
CHLORIDE SERPL-SCNC: 105 MMOL/L (ref 98–109)
CREAT SERPL-MCNC: 0.8 MG/DL (ref 0.6–1.3)
EOSINOPHIL # BLD AUTO: 0.1 X10(3)/MCL (ref 0–0.9)
EOSINOPHIL NFR BLD AUTO: 1.1 %
ERYTHROCYTE [DISTWIDTH] IN BLOOD BY AUTOMATED COUNT: 14.9 % (ref 11.5–17)
GLUCOSE SERPL-MCNC: 129 MG/DL (ref 70–105)
HCT VFR BLD AUTO: 40.2 % (ref 37–47)
HCT VFR BLD CALC: 41 % (ref 38–51)
HGB BLD-MCNC: 13.1 GM/DL (ref 12–16)
HGB BLD-MCNC: 13.9 MG/DL (ref 12–17)
LIVER PROFILE INTERP: ABNORMAL
LYMPHOCYTES # BLD AUTO: 1.4 X10(3)/MCL (ref 0.6–4.6)
LYMPHOCYTES NFR BLD AUTO: 21.1 %
MCH RBC QN AUTO: 32.4 PG (ref 27–31)
MCHC RBC AUTO-ENTMCNC: 32.6 GM/DL (ref 33–36)
MCV RBC AUTO: 99.5 FL (ref 80–94)
MONOCYTES # BLD AUTO: 0.5 X10(3)/MCL (ref 0.1–1.3)
MONOCYTES NFR BLD AUTO: 7.1 %
NEUTROPHILS # BLD AUTO: 4.1 X10(3)/MCL (ref 2.1–9.2)
NEUTROPHILS NFR BLD AUTO: 63.9 %
PLATELET # BLD AUTO: 94 X10(3)/MCL (ref 130–400)
PMV BLD AUTO: 9.1 FL (ref 9.4–12.4)
POC IONIZED CALCIUM: 1.27 MMOL/L (ref 1.12–1.32)
POC TCO2: 23 MMOL/L (ref 24–29)
POTASSIUM BLD-SCNC: 3.8 MMOL/L (ref 3.5–4.9)
PROT SERPL-MCNC: 6.6 GM/DL (ref 6.4–8.3)
RBC # BLD AUTO: 4.04 X10(6)/MCL (ref 4.2–5.4)
SODIUM BLD-SCNC: 142 MMOL/L (ref 138–146)
WBC # SPEC AUTO: 6.5 X10(3)/MCL (ref 4.5–11.5)

## 2021-10-01 ENCOUNTER — HISTORICAL (OUTPATIENT)
Dept: INFUSION THERAPY | Facility: HOSPITAL | Age: 51
End: 2021-10-01

## 2021-10-06 ENCOUNTER — HISTORICAL (OUTPATIENT)
Dept: INFUSION THERAPY | Facility: HOSPITAL | Age: 51
End: 2021-10-06

## 2021-10-06 LAB
ABS NEUT (OLG): 4.25 X10(3)/MCL (ref 2.1–9.2)
ALBUMIN SERPL-MCNC: 3.7 GM/DL (ref 3.5–5)
ALBUMIN/GLOB SERPL: 1.3 RATIO (ref 1.1–2)
ALP SERPL-CCNC: 202 UNIT/L (ref 40–150)
ALT SERPL-CCNC: 225 UNIT/L (ref 0–55)
AST SERPL-CCNC: 169 UNIT/L (ref 5–34)
BASOPHILS # BLD AUTO: 0 X10(3)/MCL (ref 0–0.2)
BASOPHILS NFR BLD AUTO: 0.2 %
BILIRUB SERPL-MCNC: 0.3 MG/DL
BILIRUBIN DIRECT+TOT PNL SERPL-MCNC: 0.1 MG/DL (ref 0–0.5)
BILIRUBIN DIRECT+TOT PNL SERPL-MCNC: 0.2 MG/DL (ref 0–0.8)
BUN SERPL-MCNC: 15.4 MG/DL (ref 9.8–20.1)
CALCIUM SERPL-MCNC: 8.9 MG/DL (ref 8.4–10.2)
CHLORIDE SERPL-SCNC: 110 MMOL/L (ref 98–107)
CO2 SERPL-SCNC: 21 MMOL/L (ref 22–29)
CREAT SERPL-MCNC: 0.77 MG/DL (ref 0.55–1.02)
EOSINOPHIL # BLD AUTO: 0.1 X10(3)/MCL (ref 0–0.9)
EOSINOPHIL NFR BLD AUTO: 1 %
ERYTHROCYTE [DISTWIDTH] IN BLOOD BY AUTOMATED COUNT: 14.2 % (ref 11.5–17)
GLOBULIN SER-MCNC: 2.9 GM/DL (ref 2.4–3.5)
GLUCOSE SERPL-MCNC: 103 MG/DL (ref 74–100)
HCT VFR BLD AUTO: 39.2 % (ref 37–47)
HGB BLD-MCNC: 12.6 GM/DL (ref 12–16)
LYMPHOCYTES # BLD AUTO: 1.2 X10(3)/MCL (ref 0.6–4.6)
LYMPHOCYTES NFR BLD AUTO: 19 %
MCH RBC QN AUTO: 32.6 PG (ref 27–31)
MCHC RBC AUTO-ENTMCNC: 32.1 GM/DL (ref 33–36)
MCV RBC AUTO: 101.3 FL (ref 80–94)
MONOCYTES # BLD AUTO: 0.7 X10(3)/MCL (ref 0.1–1.3)
MONOCYTES NFR BLD AUTO: 11.6 %
NEUTROPHILS # BLD AUTO: 4.2 X10(3)/MCL (ref 2.1–9.2)
NEUTROPHILS NFR BLD AUTO: 67.4 %
PLATELET # BLD AUTO: 78 X10(3)/MCL (ref 130–400)
PMV BLD AUTO: 9.5 FL (ref 9.4–12.4)
POTASSIUM SERPL-SCNC: 4 MMOL/L (ref 3.5–5.1)
PROT SERPL-MCNC: 6.6 GM/DL (ref 6.4–8.3)
RBC # BLD AUTO: 3.87 X10(6)/MCL (ref 4.2–5.4)
SODIUM SERPL-SCNC: 140 MMOL/L (ref 136–145)
WBC # SPEC AUTO: 6.3 X10(3)/MCL (ref 4.5–11.5)

## 2021-10-13 ENCOUNTER — HISTORICAL (OUTPATIENT)
Dept: INFUSION THERAPY | Facility: HOSPITAL | Age: 51
End: 2021-10-13

## 2021-10-13 LAB
ABS NEUT (OLG): 4.55 X10(3)/MCL (ref 2.1–9.2)
ALBUMIN SERPL-MCNC: 3.9 GM/DL (ref 3.5–5)
ALP SERPL-CCNC: 169 UNIT/L (ref 40–150)
ALT SERPL-CCNC: 73 UNIT/L (ref 0–55)
ANION GAP SERPL CALC-SCNC: 17 MMOL/L
AST SERPL-CCNC: 41 UNIT/L (ref 5–34)
BASOPHILS # BLD AUTO: 0 X10(3)/MCL (ref 0–0.2)
BASOPHILS NFR BLD AUTO: 0.1 %
BILIRUB SERPL-MCNC: 0.3 MG/DL
BILIRUBIN DIRECT+TOT PNL SERPL-MCNC: 0.1 MG/DL (ref 0–0.5)
BILIRUBIN DIRECT+TOT PNL SERPL-MCNC: 0.2 MG/DL (ref 0–0.8)
BUN SERPL-MCNC: 10 MG/DL (ref 8–26)
CHLORIDE SERPL-SCNC: 109 MMOL/L (ref 98–109)
CREAT SERPL-MCNC: 0.7 MG/DL (ref 0.6–1.3)
EOSINOPHIL # BLD AUTO: 0.1 X10(3)/MCL (ref 0–0.9)
EOSINOPHIL NFR BLD AUTO: 1.3 %
ERYTHROCYTE [DISTWIDTH] IN BLOOD BY AUTOMATED COUNT: 14.1 % (ref 11.5–17)
GLUCOSE SERPL-MCNC: 108 MG/DL (ref 70–105)
HCT VFR BLD AUTO: 39.2 % (ref 37–47)
HCT VFR BLD CALC: 39 % (ref 38–51)
HGB BLD-MCNC: 12.8 GM/DL (ref 12–16)
HGB BLD-MCNC: 13.3 MG/DL (ref 12–17)
LIVER PROFILE INTERP: ABNORMAL
LYMPHOCYTES # BLD AUTO: 1.6 X10(3)/MCL (ref 0.6–4.6)
LYMPHOCYTES NFR BLD AUTO: 20.8 %
MCH RBC QN AUTO: 32.8 PG (ref 27–31)
MCHC RBC AUTO-ENTMCNC: 32.7 GM/DL (ref 33–36)
MCV RBC AUTO: 100.5 FL (ref 80–94)
MONOCYTES # BLD AUTO: 0.6 X10(3)/MCL (ref 0.1–1.3)
MONOCYTES NFR BLD AUTO: 8.4 %
NEUTROPHILS # BLD AUTO: 4.6 X10(3)/MCL (ref 2.1–9.2)
NEUTROPHILS NFR BLD AUTO: 59.8 %
PLATELET # BLD AUTO: 104 X10(3)/MCL (ref 130–400)
PMV BLD AUTO: 8.9 FL (ref 9.4–12.4)
POC IONIZED CALCIUM: 1.21 MMOL/L (ref 1.12–1.32)
POC TCO2: 21 MMOL/L (ref 24–29)
POTASSIUM BLD-SCNC: 4.1 MMOL/L (ref 3.5–4.9)
PROT SERPL-MCNC: 6.6 GM/DL (ref 6.4–8.3)
RBC # BLD AUTO: 3.9 X10(6)/MCL (ref 4.2–5.4)
SODIUM BLD-SCNC: 142 MMOL/L (ref 138–146)
WBC # SPEC AUTO: 7.6 X10(3)/MCL (ref 4.5–11.5)

## 2021-10-15 ENCOUNTER — HISTORICAL (OUTPATIENT)
Dept: INFUSION THERAPY | Facility: HOSPITAL | Age: 51
End: 2021-10-15

## 2021-10-20 ENCOUNTER — HISTORICAL (OUTPATIENT)
Dept: INFUSION THERAPY | Facility: HOSPITAL | Age: 51
End: 2021-10-20

## 2021-10-20 LAB
ABS NEUT (OLG): 6.74 X10(3)/MCL (ref 2.1–9.2)
ALBUMIN SERPL-MCNC: 3.8 GM/DL (ref 3.5–5)
ALBUMIN/GLOB SERPL: 1.4 RATIO (ref 1.1–2)
ALP SERPL-CCNC: 226 UNIT/L (ref 40–150)
ALT SERPL-CCNC: 121 UNIT/L (ref 0–55)
AST SERPL-CCNC: 80 UNIT/L (ref 5–34)
BASOPHILS # BLD AUTO: 0 X10(3)/MCL (ref 0–0.2)
BASOPHILS NFR BLD AUTO: 0.2 %
BILIRUB SERPL-MCNC: 0.2 MG/DL
BILIRUBIN DIRECT+TOT PNL SERPL-MCNC: 0.1 MG/DL (ref 0–0.5)
BILIRUBIN DIRECT+TOT PNL SERPL-MCNC: 0.1 MG/DL (ref 0–0.8)
BUN SERPL-MCNC: 14.4 MG/DL (ref 9.8–20.1)
CALCIUM SERPL-MCNC: 8.9 MG/DL (ref 8.7–10.5)
CHLORIDE SERPL-SCNC: 108 MMOL/L (ref 98–107)
CO2 SERPL-SCNC: 25 MMOL/L (ref 22–29)
CREAT SERPL-MCNC: 0.76 MG/DL (ref 0.55–1.02)
EOSINOPHIL # BLD AUTO: 0 X10(3)/MCL (ref 0–0.9)
EOSINOPHIL NFR BLD AUTO: 0.6 %
ERYTHROCYTE [DISTWIDTH] IN BLOOD BY AUTOMATED COUNT: 13.1 % (ref 11.5–17)
GLOBULIN SER-MCNC: 2.7 GM/DL (ref 2.4–3.5)
GLUCOSE SERPL-MCNC: 100 MG/DL (ref 74–100)
HCT VFR BLD AUTO: 38.8 % (ref 37–47)
HGB BLD-MCNC: 12.5 GM/DL (ref 12–16)
LYMPHOCYTES # BLD AUTO: 1.5 X10(3)/MCL (ref 0.6–4.6)
LYMPHOCYTES NFR BLD AUTO: 16.1 %
MCH RBC QN AUTO: 32.7 PG (ref 27–31)
MCHC RBC AUTO-ENTMCNC: 32.2 GM/DL (ref 33–36)
MCV RBC AUTO: 101.6 FL (ref 80–94)
MONOCYTES # BLD AUTO: 0.6 X10(3)/MCL (ref 0.1–1.3)
MONOCYTES NFR BLD AUTO: 7.2 %
NEUTROPHILS # BLD AUTO: 6.7 X10(3)/MCL (ref 2.1–9.2)
NEUTROPHILS NFR BLD AUTO: 74.1 %
PLATELET # BLD AUTO: 101 X10(3)/MCL (ref 130–400)
PMV BLD AUTO: 9.7 FL (ref 9.4–12.4)
POTASSIUM SERPL-SCNC: 4.5 MMOL/L (ref 3.5–5.1)
PROT SERPL-MCNC: 6.5 GM/DL (ref 6.4–8.3)
RBC # BLD AUTO: 3.82 X10(6)/MCL (ref 4.2–5.4)
SODIUM SERPL-SCNC: 138 MMOL/L (ref 136–145)
WBC # SPEC AUTO: 9.1 X10(3)/MCL (ref 4.5–11.5)

## 2021-10-26 ENCOUNTER — HISTORICAL (OUTPATIENT)
Dept: ADMINISTRATIVE | Facility: HOSPITAL | Age: 51
End: 2021-10-26

## 2021-10-26 LAB
ABS NEUT (OLG): 3.7 X10(3)/MCL (ref 2.1–9.2)
ALBUMIN SERPL-MCNC: 3.6 GM/DL (ref 3.5–5)
ALP SERPL-CCNC: 144 UNIT/L (ref 40–150)
ALT SERPL-CCNC: 54 UNIT/L (ref 0–55)
ANION GAP SERPL CALC-SCNC: 15 MMOL/L
AST SERPL-CCNC: 47 UNIT/L (ref 5–34)
BASOPHILS # BLD AUTO: 0 X10(3)/MCL (ref 0–0.2)
BASOPHILS NFR BLD AUTO: 0.3 %
BILIRUB SERPL-MCNC: 0.5 MG/DL
BILIRUBIN DIRECT+TOT PNL SERPL-MCNC: <0.1 MG/DL (ref 0–0.5)
BILIRUBIN DIRECT+TOT PNL SERPL-MCNC: >0.4 MG/DL (ref 0–0.8)
BUN SERPL-MCNC: 19 MG/DL (ref 8–26)
CHLORIDE SERPL-SCNC: 105 MMOL/L (ref 98–109)
CREAT SERPL-MCNC: 1 MG/DL (ref 0.6–1.3)
EOSINOPHIL # BLD AUTO: 0 X10(3)/MCL (ref 0–0.9)
EOSINOPHIL NFR BLD AUTO: 0.3 %
ERYTHROCYTE [DISTWIDTH] IN BLOOD BY AUTOMATED COUNT: 13 % (ref 11.5–17)
GLUCOSE SERPL-MCNC: 103 MG/DL (ref 70–105)
HCT VFR BLD AUTO: 36.3 % (ref 37–47)
HCT VFR BLD CALC: 36 % (ref 38–51)
HGB BLD-MCNC: 12.2 GM/DL (ref 12–16)
HGB BLD-MCNC: 12.2 MG/DL (ref 12–17)
LIVER PROFILE INTERP: ABNORMAL
LYMPHOCYTES # BLD AUTO: 1.4 X10(3)/MCL (ref 0.6–4.6)
LYMPHOCYTES NFR BLD AUTO: 23 %
MCH RBC QN AUTO: 33.1 PG (ref 27–31)
MCHC RBC AUTO-ENTMCNC: 33.6 GM/DL (ref 33–36)
MCV RBC AUTO: 98.4 FL (ref 80–94)
MONOCYTES # BLD AUTO: 0.6 X10(3)/MCL (ref 0.1–1.3)
MONOCYTES NFR BLD AUTO: 9.9 %
NEUTROPHILS # BLD AUTO: 3.7 X10(3)/MCL (ref 2.1–9.2)
NEUTROPHILS NFR BLD AUTO: 63.1 %
PLATELET # BLD AUTO: 111 X10(3)/MCL (ref 130–400)
PMV BLD AUTO: 9.4 FL (ref 9.4–12.4)
POC IONIZED CALCIUM: 1.19 MMOL/L (ref 1.12–1.32)
POC TCO2: 24 MMOL/L (ref 24–29)
POTASSIUM BLD-SCNC: 3.2 MMOL/L (ref 3.5–4.9)
PROT SERPL-MCNC: 6.3 GM/DL (ref 6.4–8.3)
RBC # BLD AUTO: 3.69 X10(6)/MCL (ref 4.2–5.4)
SODIUM BLD-SCNC: 139 MMOL/L (ref 138–146)
WBC # SPEC AUTO: 5.9 X10(3)/MCL (ref 4.5–11.5)

## 2021-10-27 ENCOUNTER — HISTORICAL (OUTPATIENT)
Dept: INFUSION THERAPY | Facility: HOSPITAL | Age: 51
End: 2021-10-27

## 2021-10-29 ENCOUNTER — HISTORICAL (OUTPATIENT)
Dept: INFUSION THERAPY | Facility: HOSPITAL | Age: 51
End: 2021-10-29

## 2021-11-03 ENCOUNTER — HISTORICAL (OUTPATIENT)
Dept: INFUSION THERAPY | Facility: HOSPITAL | Age: 51
End: 2021-11-03

## 2021-11-03 LAB
ABS NEUT (OLG): 3.06 X10(3)/MCL (ref 2.1–9.2)
BASOPHILS # BLD AUTO: 0 X10(3)/MCL (ref 0–0.2)
BASOPHILS NFR BLD AUTO: 0.2 %
EOSINOPHIL # BLD AUTO: 0 X10(3)/MCL (ref 0–0.9)
EOSINOPHIL NFR BLD AUTO: 0.6 %
ERYTHROCYTE [DISTWIDTH] IN BLOOD BY AUTOMATED COUNT: 12.7 % (ref 11.5–17)
HCT VFR BLD AUTO: 37 % (ref 37–47)
HGB BLD-MCNC: 12.3 GM/DL (ref 12–16)
LYMPHOCYTES # BLD AUTO: 1.2 X10(3)/MCL (ref 0.6–4.6)
LYMPHOCYTES NFR BLD AUTO: 25.2 %
MCH RBC QN AUTO: 32.9 PG (ref 27–31)
MCHC RBC AUTO-ENTMCNC: 33.2 GM/DL (ref 33–36)
MCV RBC AUTO: 98.9 FL (ref 80–94)
MONOCYTES # BLD AUTO: 0.6 X10(3)/MCL (ref 0.1–1.3)
MONOCYTES NFR BLD AUTO: 11.7 %
NEUTROPHILS # BLD AUTO: 3.1 X10(3)/MCL (ref 2.1–9.2)
NEUTROPHILS NFR BLD AUTO: 61.5 %
PLATELET # BLD AUTO: 112 X10(3)/MCL (ref 130–400)
PMV BLD AUTO: 9.1 FL (ref 9.4–12.4)
RBC # BLD AUTO: 3.74 X10(6)/MCL (ref 4.2–5.4)
WBC # SPEC AUTO: 5 X10(3)/MCL (ref 4.5–11.5)

## 2021-11-09 ENCOUNTER — HISTORICAL (OUTPATIENT)
Dept: INFUSION THERAPY | Facility: HOSPITAL | Age: 51
End: 2021-11-09

## 2021-11-09 LAB
ABS NEUT (OLG): 3.94 X10(3)/MCL (ref 2.1–9.2)
ALBUMIN SERPL-MCNC: 3.7 GM/DL (ref 3.5–5)
ALP SERPL-CCNC: 153 UNIT/L (ref 40–150)
ALT SERPL-CCNC: 43 UNIT/L (ref 0–55)
ANION GAP SERPL CALC-SCNC: 18 MMOL/L
AST SERPL-CCNC: 37 UNIT/L (ref 5–34)
BASOPHILS # BLD AUTO: 0 X10(3)/MCL (ref 0–0.2)
BASOPHILS NFR BLD AUTO: 0.2 %
BILIRUB SERPL-MCNC: <0.5 MG/DL
BILIRUBIN DIRECT+TOT PNL SERPL-MCNC: 0.1 MG/DL (ref 0–0.5)
BILIRUBIN DIRECT+TOT PNL SERPL-MCNC: <0.4 MG/DL (ref 0–0.8)
BUN SERPL-MCNC: 11 MG/DL (ref 8–26)
CHLORIDE SERPL-SCNC: 108 MMOL/L (ref 98–109)
CREAT SERPL-MCNC: 0.7 MG/DL (ref 0.6–1.3)
EOSINOPHIL # BLD AUTO: 0 X10(3)/MCL (ref 0–0.9)
EOSINOPHIL NFR BLD AUTO: 0.5 %
ERYTHROCYTE [DISTWIDTH] IN BLOOD BY AUTOMATED COUNT: 13.3 % (ref 11.5–17)
GLUCOSE SERPL-MCNC: 131 MG/DL (ref 70–105)
HCT VFR BLD AUTO: 36.3 % (ref 37–47)
HCT VFR BLD CALC: 36 % (ref 38–51)
HGB BLD-MCNC: 11.9 GM/DL (ref 12–16)
HGB BLD-MCNC: 12.2 MG/DL (ref 12–17)
LIVER PROFILE INTERP: ABNORMAL
LYMPHOCYTES # BLD AUTO: 1.3 X10(3)/MCL (ref 0.6–4.6)
LYMPHOCYTES NFR BLD AUTO: 20.2 %
MCH RBC QN AUTO: 32.4 PG (ref 27–31)
MCHC RBC AUTO-ENTMCNC: 32.8 GM/DL (ref 33–36)
MCV RBC AUTO: 98.9 FL (ref 80–94)
MONOCYTES # BLD AUTO: 0.6 X10(3)/MCL (ref 0.1–1.3)
MONOCYTES NFR BLD AUTO: 9.8 %
NEUTROPHILS # BLD AUTO: 3.9 X10(3)/MCL (ref 2.1–9.2)
NEUTROPHILS NFR BLD AUTO: 59.3 %
PLATELET # BLD AUTO: 111 X10(3)/MCL (ref 130–400)
PMV BLD AUTO: 9.3 FL (ref 9.4–12.4)
POC IONIZED CALCIUM: 1.24 MMOL/L (ref 1.12–1.32)
POC TCO2: 21 MMOL/L (ref 24–29)
POTASSIUM BLD-SCNC: 3.7 MMOL/L (ref 3.5–4.9)
PROT SERPL-MCNC: 6.5 GM/DL (ref 6.4–8.3)
RBC # BLD AUTO: 3.67 X10(6)/MCL (ref 4.2–5.4)
SODIUM BLD-SCNC: 143 MMOL/L (ref 138–146)
WBC # SPEC AUTO: 6.6 X10(3)/MCL (ref 4.5–11.5)

## 2021-11-11 ENCOUNTER — HISTORICAL (OUTPATIENT)
Dept: INFUSION THERAPY | Facility: HOSPITAL | Age: 51
End: 2021-11-11

## 2021-11-17 ENCOUNTER — HISTORICAL (OUTPATIENT)
Dept: INFUSION THERAPY | Facility: HOSPITAL | Age: 51
End: 2021-11-17

## 2021-11-17 LAB
ABS NEUT (OLG): 3.84 X10(3)/MCL (ref 2.1–9.2)
BASOPHILS # BLD AUTO: 0 X10(3)/MCL (ref 0–0.2)
BASOPHILS NFR BLD AUTO: 0.5 %
EOSINOPHIL # BLD AUTO: 0 X10(3)/MCL (ref 0–0.9)
EOSINOPHIL NFR BLD AUTO: 0.5 %
ERYTHROCYTE [DISTWIDTH] IN BLOOD BY AUTOMATED COUNT: 13 % (ref 11.5–17)
HCT VFR BLD AUTO: 36.7 % (ref 37–47)
HGB BLD-MCNC: 12.2 GM/DL (ref 12–16)
LYMPHOCYTES # BLD AUTO: 1 X10(3)/MCL (ref 0.6–4.6)
LYMPHOCYTES NFR BLD AUTO: 18.4 %
MCH RBC QN AUTO: 32.7 PG (ref 27–31)
MCHC RBC AUTO-ENTMCNC: 33.2 GM/DL (ref 33–36)
MCV RBC AUTO: 98.4 FL (ref 80–94)
MONOCYTES # BLD AUTO: 0.5 X10(3)/MCL (ref 0.1–1.3)
MONOCYTES NFR BLD AUTO: 9.5 %
NEUTROPHILS # BLD AUTO: 3.8 X10(3)/MCL (ref 2.1–9.2)
NEUTROPHILS NFR BLD AUTO: 70.2 %
PLATELET # BLD AUTO: 78 X10(3)/MCL (ref 130–400)
PMV BLD AUTO: 9.9 FL (ref 9.4–12.4)
RBC # BLD AUTO: 3.73 X10(6)/MCL (ref 4.2–5.4)
WBC # SPEC AUTO: 5.5 X10(3)/MCL (ref 4.5–11.5)

## 2021-11-24 ENCOUNTER — HISTORICAL (OUTPATIENT)
Dept: INFUSION THERAPY | Facility: HOSPITAL | Age: 51
End: 2021-11-24

## 2021-11-24 LAB
ABS NEUT (OLG): 4.36 X10(3)/MCL (ref 2.1–9.2)
ANION GAP SERPL CALC-SCNC: 17 MMOL/L
BASOPHILS # BLD AUTO: 0 X10(3)/MCL (ref 0–0.2)
BASOPHILS NFR BLD AUTO: 0.1 %
BUN SERPL-MCNC: 8 MG/DL (ref 8–26)
CHLORIDE SERPL-SCNC: 107 MMOL/L (ref 98–109)
CREAT SERPL-MCNC: 0.8 MG/DL (ref 0.6–1.3)
EOSINOPHIL # BLD AUTO: 0 X10(3)/MCL (ref 0–0.9)
EOSINOPHIL NFR BLD AUTO: 0.6 %
ERYTHROCYTE [DISTWIDTH] IN BLOOD BY AUTOMATED COUNT: 13.7 % (ref 11.5–17)
GLUCOSE SERPL-MCNC: 152 MG/DL (ref 70–105)
HCT VFR BLD AUTO: 36.5 % (ref 37–47)
HCT VFR BLD CALC: 36 % (ref 38–51)
HGB BLD-MCNC: 11.9 GM/DL (ref 12–16)
HGB BLD-MCNC: 12.2 MG/DL (ref 12–17)
LYMPHOCYTES # BLD AUTO: 1.6 X10(3)/MCL (ref 0.6–4.6)
LYMPHOCYTES NFR BLD AUTO: 21.5 %
MCH RBC QN AUTO: 32.4 PG (ref 27–31)
MCHC RBC AUTO-ENTMCNC: 32.6 GM/DL (ref 33–36)
MCV RBC AUTO: 99.5 FL (ref 80–94)
MONOCYTES # BLD AUTO: 0.5 X10(3)/MCL (ref 0.1–1.3)
MONOCYTES NFR BLD AUTO: 7.4 %
NEUTROPHILS # BLD AUTO: 4.4 X10(3)/MCL (ref 2.1–9.2)
NEUTROPHILS NFR BLD AUTO: 59.9 %
PLATELET # BLD AUTO: 102 X10(3)/MCL (ref 130–400)
PMV BLD AUTO: 9 FL (ref 9.4–12.4)
POC IONIZED CALCIUM: 1.24 MMOL/L (ref 1.12–1.32)
POC TCO2: 24 MMOL/L (ref 24–29)
POTASSIUM BLD-SCNC: 3.7 MMOL/L (ref 3.5–4.9)
RBC # BLD AUTO: 3.67 X10(6)/MCL (ref 4.2–5.4)
SODIUM BLD-SCNC: 142 MMOL/L (ref 138–146)
WBC # SPEC AUTO: 7.3 X10(3)/MCL (ref 4.5–11.5)

## 2021-11-26 ENCOUNTER — HISTORICAL (OUTPATIENT)
Dept: INFUSION THERAPY | Facility: HOSPITAL | Age: 51
End: 2021-11-26

## 2021-12-01 ENCOUNTER — HISTORICAL (OUTPATIENT)
Dept: INFUSION THERAPY | Facility: HOSPITAL | Age: 51
End: 2021-12-01

## 2021-12-01 LAB
ABS NEUT (OLG): 7.06 X10(3)/MCL (ref 2.1–9.2)
BASOPHILS # BLD AUTO: 0 X10(3)/MCL (ref 0–0.2)
BASOPHILS NFR BLD AUTO: 0.2 %
EOSINOPHIL # BLD AUTO: 0 X10(3)/MCL (ref 0–0.9)
EOSINOPHIL NFR BLD AUTO: 0.2 %
ERYTHROCYTE [DISTWIDTH] IN BLOOD BY AUTOMATED COUNT: 13.2 % (ref 11.5–17)
HCT VFR BLD AUTO: 38 % (ref 37–47)
HGB BLD-MCNC: 12.6 GM/DL (ref 12–16)
LYMPHOCYTES # BLD AUTO: 1.2 X10(3)/MCL (ref 0.6–4.6)
LYMPHOCYTES NFR BLD AUTO: 14 %
MCH RBC QN AUTO: 32.3 PG (ref 27–31)
MCHC RBC AUTO-ENTMCNC: 33.2 GM/DL (ref 33–36)
MCV RBC AUTO: 97.4 FL (ref 80–94)
MONOCYTES # BLD AUTO: 0.4 X10(3)/MCL (ref 0.1–1.3)
MONOCYTES NFR BLD AUTO: 5 %
NEUTROPHILS # BLD AUTO: 7.1 X10(3)/MCL (ref 2.1–9.2)
NEUTROPHILS NFR BLD AUTO: 79.6 %
PLATELET # BLD AUTO: 90 X10(3)/MCL (ref 130–400)
PMV BLD AUTO: 9.6 FL (ref 9.4–12.4)
RBC # BLD AUTO: 3.9 X10(6)/MCL (ref 4.2–5.4)
WBC # SPEC AUTO: 8.9 X10(3)/MCL (ref 4.5–11.5)

## 2021-12-08 ENCOUNTER — HISTORICAL (OUTPATIENT)
Dept: INFUSION THERAPY | Facility: HOSPITAL | Age: 51
End: 2021-12-08

## 2021-12-08 LAB
ABS NEUT (OLG): 5.49 X10(3)/MCL (ref 2.1–9.2)
ALBUMIN SERPL-MCNC: 3.8 GM/DL (ref 3.5–5)
ALBUMIN/GLOB SERPL: 1.3 RATIO (ref 1.1–2)
ALP SERPL-CCNC: 181 UNIT/L (ref 40–150)
ALT SERPL-CCNC: 44 UNIT/L (ref 0–55)
AST SERPL-CCNC: 35 UNIT/L (ref 5–34)
BASOPHILS # BLD AUTO: 0 X10(3)/MCL (ref 0–0.2)
BASOPHILS NFR BLD AUTO: 0.1 %
BILIRUB SERPL-MCNC: 0.3 MG/DL
BILIRUBIN DIRECT+TOT PNL SERPL-MCNC: 0.1 MG/DL (ref 0–0.5)
BILIRUBIN DIRECT+TOT PNL SERPL-MCNC: 0.2 MG/DL (ref 0–0.8)
BUN SERPL-MCNC: 7.8 MG/DL (ref 9.8–20.1)
CALCIUM SERPL-MCNC: 8.9 MG/DL (ref 8.7–10.5)
CHLORIDE SERPL-SCNC: 109 MMOL/L (ref 98–107)
CO2 SERPL-SCNC: 25 MMOL/L (ref 22–29)
CREAT SERPL-MCNC: 0.84 MG/DL (ref 0.55–1.02)
EOSINOPHIL # BLD AUTO: 0 X10(3)/MCL (ref 0–0.9)
EOSINOPHIL NFR BLD AUTO: 0.2 %
ERYTHROCYTE [DISTWIDTH] IN BLOOD BY AUTOMATED COUNT: 14.1 % (ref 11.5–17)
GLOBULIN SER-MCNC: 3 GM/DL (ref 2.4–3.5)
GLUCOSE SERPL-MCNC: 109 MG/DL (ref 74–100)
HCT VFR BLD AUTO: 37.3 % (ref 37–47)
HGB BLD-MCNC: 12 GM/DL (ref 12–16)
INFLUENZA A ANTIGEN, POC: NEGATIVE
INFLUENZA B ANTIGEN, POC: NEGATIVE
LYMPHOCYTES # BLD AUTO: 1.3 X10(3)/MCL (ref 0.6–4.6)
LYMPHOCYTES NFR BLD AUTO: 14.8 %
MCH RBC QN AUTO: 32.2 PG (ref 27–31)
MCHC RBC AUTO-ENTMCNC: 32.2 GM/DL (ref 33–36)
MCV RBC AUTO: 100 FL (ref 80–94)
MONOCYTES # BLD AUTO: 0.8 X10(3)/MCL (ref 0.1–1.3)
MONOCYTES NFR BLD AUTO: 9.9 %
NEUTROPHILS # BLD AUTO: 5.5 X10(3)/MCL (ref 2.1–9.2)
NEUTROPHILS NFR BLD AUTO: 64.8 %
PLATELET # BLD AUTO: 122 X10(3)/MCL (ref 130–400)
PMV BLD AUTO: 9.4 FL (ref 9.4–12.4)
POTASSIUM SERPL-SCNC: 3.9 MMOL/L (ref 3.5–5.1)
PROT SERPL-MCNC: 6.8 GM/DL (ref 6.4–8.3)
RBC # BLD AUTO: 3.73 X10(6)/MCL (ref 4.2–5.4)
SARS-COV-2 RNA RESP QL NAA+PROBE: NEGATIVE
SODIUM SERPL-SCNC: 142 MMOL/L (ref 136–145)
WBC # SPEC AUTO: 8.5 X10(3)/MCL (ref 4.5–11.5)

## 2021-12-10 ENCOUNTER — HISTORICAL (OUTPATIENT)
Dept: INFUSION THERAPY | Facility: HOSPITAL | Age: 51
End: 2021-12-10

## 2021-12-30 ENCOUNTER — HISTORICAL (OUTPATIENT)
Dept: HEMATOLOGY/ONCOLOGY | Facility: CLINIC | Age: 51
End: 2021-12-30

## 2021-12-30 ENCOUNTER — HISTORICAL (OUTPATIENT)
Dept: RADIOLOGY | Facility: HOSPITAL | Age: 51
End: 2021-12-30

## 2021-12-30 LAB
ABS NEUT (OLG): 2.57 X10(3)/MCL (ref 2.1–9.2)
ALBUMIN SERPL-MCNC: 4.1 GM/DL (ref 3.5–5)
ALBUMIN/GLOB SERPL: 1.2 RATIO (ref 1.1–2)
ALP SERPL-CCNC: 153 UNIT/L (ref 40–150)
ALT SERPL-CCNC: 56 UNIT/L (ref 0–55)
AST SERPL-CCNC: 60 UNIT/L (ref 5–34)
BASOPHILS # BLD AUTO: 0 X10(3)/MCL (ref 0–0.2)
BASOPHILS NFR BLD AUTO: 0.2 %
BILIRUB SERPL-MCNC: 0.5 MG/DL
BILIRUBIN DIRECT+TOT PNL SERPL-MCNC: 0.2 MG/DL (ref 0–0.5)
BILIRUBIN DIRECT+TOT PNL SERPL-MCNC: 0.3 MG/DL (ref 0–0.8)
BUN SERPL-MCNC: 10 MG/DL (ref 9.8–20.1)
CALCIUM SERPL-MCNC: 9.7 MG/DL (ref 8.7–10.5)
CEA SERPL-MCNC: 2.52 NG/ML (ref 0–3)
CHLORIDE SERPL-SCNC: 105 MMOL/L (ref 98–107)
CO2 SERPL-SCNC: 22 MMOL/L (ref 22–29)
CREAT SERPL-MCNC: 0.74 MG/DL (ref 0.55–1.02)
EOSINOPHIL # BLD AUTO: 0 X10(3)/MCL (ref 0–0.9)
EOSINOPHIL NFR BLD AUTO: 0.5 %
ERYTHROCYTE [DISTWIDTH] IN BLOOD BY AUTOMATED COUNT: 14.3 % (ref 11.5–17)
GLOBULIN SER-MCNC: 3.3 GM/DL (ref 2.4–3.5)
GLUCOSE SERPL-MCNC: 81 MG/DL (ref 74–100)
HCT VFR BLD AUTO: 35.1 % (ref 37–47)
HGB BLD-MCNC: 12 GM/DL (ref 12–16)
LYMPHOCYTES # BLD AUTO: 1.2 X10(3)/MCL (ref 0.6–4.6)
LYMPHOCYTES NFR BLD AUTO: 28.3 %
MCH RBC QN AUTO: 32.7 PG (ref 27–31)
MCHC RBC AUTO-ENTMCNC: 34.2 GM/DL (ref 33–36)
MCV RBC AUTO: 95.6 FL (ref 80–94)
MONOCYTES # BLD AUTO: 0.4 X10(3)/MCL (ref 0.1–1.3)
MONOCYTES NFR BLD AUTO: 9.7 %
NEUTROPHILS # BLD AUTO: 2.6 X10(3)/MCL (ref 2.1–9.2)
NEUTROPHILS NFR BLD AUTO: 59.7 %
PLATELET # BLD AUTO: 113 X10(3)/MCL (ref 130–400)
PMV BLD AUTO: 9.1 FL (ref 9.4–12.4)
POTASSIUM SERPL-SCNC: 4.6 MMOL/L (ref 3.5–5.1)
PROT SERPL-MCNC: 7.4 GM/DL (ref 6.4–8.3)
RBC # BLD AUTO: 3.67 X10(6)/MCL (ref 4.2–5.4)
SODIUM SERPL-SCNC: 144 MMOL/L (ref 136–145)
WBC # SPEC AUTO: 4.3 X10(3)/MCL (ref 4.5–11.5)

## 2022-01-11 LAB
INFLUENZA A ANTIGEN, POC: NEGATIVE
INFLUENZA B ANTIGEN, POC: NEGATIVE
SARS-COV-2 RNA RESP QL NAA+PROBE: POSITIVE

## 2022-02-10 ENCOUNTER — HISTORICAL (OUTPATIENT)
Dept: INFUSION THERAPY | Facility: HOSPITAL | Age: 52
End: 2022-02-10

## 2022-03-21 ENCOUNTER — HISTORICAL (OUTPATIENT)
Dept: ADMINISTRATIVE | Facility: HOSPITAL | Age: 52
End: 2022-03-21

## 2022-03-21 LAB
ABS NEUT (OLG): 1.52 (ref 2.1–9.2)
ALBUMIN SERPL-MCNC: 3.8 G/DL (ref 3.5–5)
ALBUMIN/GLOB SERPL: 1.2 {RATIO} (ref 1.1–2)
ALP SERPL-CCNC: 115 U/L (ref 40–150)
ALT SERPL-CCNC: 23 U/L (ref 0–55)
AST SERPL-CCNC: 27 U/L (ref 5–34)
BASOPHILS # BLD AUTO: 0 10*3/UL (ref 0–0.2)
BASOPHILS NFR BLD AUTO: 0.7 %
BILIRUB SERPL-MCNC: 0.3 MG/DL
BILIRUBIN DIRECT+TOT PNL SERPL-MCNC: 0.1 (ref 0–0.5)
BILIRUBIN DIRECT+TOT PNL SERPL-MCNC: 0.2 (ref 0–0.8)
BUN SERPL-MCNC: 13.7 MG/DL (ref 9.8–20.1)
CALCIUM SERPL-MCNC: 8.5 MG/DL (ref 8.7–10.5)
CEA SERPL-MCNC: <1.73 NG/ML (ref 0–3)
CHLORIDE SERPL-SCNC: 110 MMOL/L (ref 98–107)
CO2 SERPL-SCNC: 23 MMOL/L (ref 22–29)
CREAT SERPL-MCNC: 0.84 MG/DL (ref 0.55–1.02)
EOSINOPHIL # BLD AUTO: 0 10*3/UL (ref 0–0.9)
EOSINOPHIL NFR BLD AUTO: 1 %
ERYTHROCYTE [DISTWIDTH] IN BLOOD BY AUTOMATED COUNT: 11 % (ref 11.5–17)
GLOBULIN SER-MCNC: 3.3 G/DL (ref 2.4–3.5)
GLUCOSE SERPL-MCNC: 101 MG/DL (ref 74–100)
HCT VFR BLD AUTO: 36.4 % (ref 37–47)
HEMOLYSIS INTERF INDEX SERPL-ACNC: 19
HGB BLD-MCNC: 12.1 G/DL (ref 12–16)
ICTERIC INTERF INDEX SERPL-ACNC: 0
LIPEMIC INTERF INDEX SERPL-ACNC: 2
LYMPHOCYTES # BLD AUTO: 1.1 10*3/UL (ref 0.6–4.6)
LYMPHOCYTES NFR BLD AUTO: 36.5 %
MANUAL DIFF? (OHS): NO
MCH RBC QN AUTO: 31.3 PG (ref 27–31)
MCHC RBC AUTO-ENTMCNC: 33.2 G/DL (ref 33–36)
MCV RBC AUTO: 94.3 FL (ref 80–94)
MONOCYTES # BLD AUTO: 0.3 10*3/UL (ref 0.1–1.3)
MONOCYTES NFR BLD AUTO: 10.7 %
NEUTROPHILS # BLD AUTO: 1.5 10*3/UL (ref 2.1–9.2)
NEUTROPHILS NFR BLD AUTO: 50.8 %
PLATELET # BLD AUTO: 140 10*3/UL (ref 130–400)
PMV BLD AUTO: 9.2 FL (ref 9.4–12.4)
POTASSIUM SERPL-SCNC: 4.4 MMOL/L (ref 3.5–5.1)
PROT SERPL-MCNC: 7.1 G/DL (ref 6.4–8.3)
RBC # BLD AUTO: 3.86 10*6/UL (ref 4.2–5.4)
SODIUM SERPL-SCNC: 138 MMOL/L (ref 136–145)
WBC # SPEC AUTO: 3 10*3/UL (ref 4.5–11.5)

## 2022-04-08 ENCOUNTER — HISTORICAL (OUTPATIENT)
Dept: INFUSION THERAPY | Facility: HOSPITAL | Age: 52
End: 2022-04-08

## 2022-04-11 ENCOUNTER — HISTORICAL (OUTPATIENT)
Dept: ADMINISTRATIVE | Facility: HOSPITAL | Age: 52
End: 2022-04-11
Payer: COMMERCIAL

## 2022-04-27 VITALS
SYSTOLIC BLOOD PRESSURE: 133 MMHG | BODY MASS INDEX: 27.55 KG/M2 | OXYGEN SATURATION: 99 % | WEIGHT: 149.69 LBS | HEIGHT: 62 IN | DIASTOLIC BLOOD PRESSURE: 85 MMHG

## 2022-04-29 NOTE — PROGRESS NOTES
Patient:   Melissa Jones            MRN: [id number]            FIN: [id number]               Age:   51 years     Sex:  Female     :  1970   Associated Diagnoses:   Malignant neoplasm of sigmoid colon; Iron deficiency anemia; Agranulocytosis secondary to cancer chemotherapy   Author:   Kamron Cordero      Surgeon: Dr. Cruz Farmer    1. Sigmoid Colon Cancer--Stage IIIC ([id number])--Diagnosed 21  Biopsy/pathology:  Colonoscopy done 21--circumferential, ulcerated, sigmoid colon mass at 30cm, biopsy positive for moderately differentiated invasive adenocarcinoma, MMR intact(YURIY), scope did traverse the lesion with some maneuvering, internal hemorrhoids noted, normal mucosa in terminal ileum, otherwise normal examination.  Surgery/pathology:  Laparoscopic sigmoid colectomy done 21--adenocarcinoma, moderately differentiated, measuring 3.5cm with invasion through the muscularis propria into pericolic adipose tissue, margins clear, / regional lymph nodes positive for metastatic adenocarcinoma, focal discontinuous extramural tumor extension c/w vascular invasion (pT3N2b).  Imagin. CT A/P w/ contrast done 21--very mild stranding along a short segment of sigmoid colon may correspond with the primary malignancy, no metastatic disease.   2. CT chest done 21--No acute abnormality. No CT evidence of metastatic disease.    2. Iron Deficiency Anemia--21      3. Idiopathic Thrombocytopenia Purpura - 21  Positive for HLA Class 1A and Anti IIa/IIIa antibodies on 21.     Procedures:   1. EGD done 21--erythema in antrum, c/w gastritis biopsy showing chronic gastritis with focal mild activity, H. pylori negative, normal mucosa in duodenum, normal esophagus and GE junction, moderate amount of bile in stomach.  2. Left subclavian mediport placed 6/15/21.    CEA:  21--3.01  6/10/21--<1.73    Treatment history:  1. Laparoscopic sigmoid colectomy 21.  2. IV  Injectafer 6/11/21 and 6/18/21.     Treatment plan:   1. FOLFOX X 12 cycles started 6/16/21, Neulasta and IV fluids added on day 3 with cycle 2  Cycle 3 delayed by 1 week due to thrombocytopenia, dose reduction of Oxaliplatin by 15%. Cycle 5 delayed due to thrombocytopenia -15% dose reduction 5FU.   Cycle 6 delayed due to thrombocytopenia - additional 10% reduction in 5FU and Oxaliplatin (total 25% dose reduction).  Due for Cycle # 7 today. IVF on day 3.     2. Nplate weekly. Plan to start on 10/6/21.       Visit Information   Visit type:  Scheduled follow-up.    Accompanied by:  No one.       Chief Complaint   9/29/2021 8:44 CDT       No c/o        Interval History   Current complaint.   Patient presents for follow-up of colon cancer. She is due for cycle 7 of FOLFOX today. She denies any problems with treatment other than fatigue. Nausea is improved. No neuropathy. Dr. Underwood ordered additional workup for the thrombocytopenia and she did have positive platelet antibodies. We discussed starting Nplate weekly in order to keep her platelets >100,000 so she can continue with current treatment regimen. Hopefully we can start next week. No other problems reported today.       Review of Systems   Constitutional:  Fatigue, No fever, No chills, No weakness.    Eye:  No recent visual problem.    Ear/Nose/Mouth/Throat:  No decreased hearing, No nasal congestion, No sore throat.    Respiratory:  No shortness of breath, No cough, No wheezing.    Cardiovascular:  No chest pain, No palpitations, No peripheral edema.    Gastrointestinal:  No nausea, No vomiting, No diarrhea, No constipation, No heartburn, No abdominal pain.    Hematology/Lymphatics:  No bruising tendency, No bleeding tendency.    Immunologic:  Chemotherapy.    Musculoskeletal:  No back pain, No joint pain.    Integumentary:  No rash, No skin lesion.    Neurologic:  Alert and oriented X4, No confusion, No headache.    Psychiatric:  No anxiety, No depression.     All other systems are negative      Health Status   Allergies:    Allergic Reactions (Selected)  No Known Allergies  No Known Medication Allergies   Current medications:  (Selected)   Outpatient Medications  Future  Aloxi (for IVPB): 0.25 mg, form: Injection, IV Piggyback, Once-chemo, Infuse over: 20 minute(s), *Est. first dose 09/29/21 8:00:00 CDT, *Est. stop date 09/29/21 8:00:00 CDT, Future Order, Days 1  Benadryl (for IVPB): 25 mg, form: Injection, IV Piggyback, Once-chemo, Infuse over: 20 minute(s), *Est. first dose 09/29/21 8:00:00 CDT, *Est. stop date 09/29/21 8:00:00 CDT, Future Order, Days 1  Heparin Flush 100 U/mL - 5 mL: 500 units, form: Injection, IV Push, Once-chemo, *Est. first dose 10/01/21 8:00:00 CDT, *Est. stop date 10/01/21 8:00:00 CDT, Routine, Days 3, Future Order  Leucovorin (for IVPB): 700 mg, form: Injection, IV Piggyback, Once-chemo, Infuse over: 2 hr, *Est. first dose 09/29/21 8:20:00 CDT, *Est. stop date 09/29/21 8:20:00 CDT, Future Order, Days 1  Normal Saline (0.9% NS) IV: 1,000 mL, IV Piggyback, Once, 500 mL/hr, *Est. start date 10/01/21 8:00:00 CDT, *Est. stop date 10/01/21 8:00:00 CDT, Days 3  Udenyca: 6 mg, form: Soln, Subcutaneous, Once-chemo, *Est. first dose 10/01/21 8:00:00 CDT, *Est. stop date 10/01/21 8:00:00 CDT, Routine, Diagnosis: Drug Induced Neutropenia, Days 3, Future Order  dexamethasone (for IVPB): 10 mg, form: Soln, IV Piggyback, Once-chemo, Infuse over: 20 minute(s), *Est. first dose 09/29/21 8:00:00 CDT, *Est. stop date 09/29/21 8:00:00 CDT, Future Order, Days 1  fluorouracil (for IVPB) -CCA: 3,000 mg, form: Injection, IV Piggyback, Once-chemo, Infuse over: 46 hr, *Est. first dose 09/29/21 10:50:00 CDT, *Est. stop date 09/29/21 10:50:00 CDT, Future Order, Days 1  fluorouracil (for IVPB) -CCA: 510 mg, form: Injection, IV Piggyback, Once-chemo, Infuse over: 30 minute(s), *Est. first dose 09/29/21 10:20:00 CDT, *Est. stop date 09/29/21 10:20:00 CDT, Future Order, Days  1  fosaprepitant (for IVPB): 150 mg, form: Powder-Inj, IV Piggyback, Once-chemo, Infuse over: 30 minute(s), *Est. first dose 09/29/21 8:00:00 CDT, *Est. stop date 09/29/21 8:00:00 CDT, Future Order, Days 1  oxaliplatin (for IVPB): 100 mg, form: Injection, IV Piggyback, Once-chemo, Infuse over: 2 hr, *Est. first dose 09/29/21 8:20:00 CDT, *Est. stop date 09/29/21 8:20:00 CDT, Future Order, Days 1  Prescriptions  Prescribed  Compazine 5 mg tab: 5 mg = 1 tab(s), Oral, TID, PRN PRN as needed for nausea/vomiting, # 30 tab(s), 1 Refill(s), Pharmacy: [facility], 157, cm, Height/Length Dosing, 06/22/21 13:46:00 CDT, 70.3, kg, Weight Dosing, 06/22/21 13:46:00 CDT  Singulair 10 mg oral TABLET: 10 mg = 1 tab(s), Oral, Daily, # 30 tab(s), 3 Refill(s), Pharmacy: [facility], 157, cm, Height/Length Dosing, 07/28/21 9:52:00 CDT, 69.3, kg, Weight Dosing, 07/28/21 9:52:00 CDT  Documented Medications  Documented  Advil 200 mg oral tablet: 400 mg = 2 tab(s), Oral, q4hr, PRN PRN as needed for pain, # 120 tab(s), 0 Refill(s)  Claritin oral tablet: Daily, 0 Refill(s)  Prilosec 20 mg oral DR capsule: 20 mg = 1 cap(s), Oral, BID, PRN PRN heartburn, 0 Refill(s)  Zofran 8 mg oral tablet: See Instructions, PRN PRN as needed for nausea/vomiting, 1 tab(s) Oral BID for 3 days after chemotherapy and every 8 hours, 4 Refill(s)  traMADol 50 mg oral tablet: 50 mg = 1 tab(s), Oral, q6hr      Histories   Past Medical History:    Resolved  Right ankle sprain ([id number]):  Resolved.  None ([id number]):  Resolved.  Cervical spondylosis ([id number]):  Resolved.   Family History:    Entire family history is negative.   Procedure history:    Catheter Insertion Mediport (.) on 6/15/2021 at 50 Years.  Comments:  6/15/2021 8:18 Christy Garcia  auto-populated from documented surgical case  Colon Resection Descending Laparoscopic (Left) on 5/18/2021 at 50 Years.  Comments:  5/18/2021 16:31 Christy Garcia  Melissa  auto-populated from documented surgical case   section ([id number]).  Colonoscopy ([id number]).  Extraction of wisdom tooth ([id number]).   Social History     Alcohol  Use: Never    Substance Use  Use: Never    Tobacco  Use: Never     Spiritual/Cultural  Spiritism Preference Catholic.        Physical Examination   Vital Signs   2021 8:44 CDT       Temperature Oral          36.7 DegC                             Temperature Oral (calculated)             98.06 DegF                             Peripheral Pulse Rate     77 bpm                             SpO2                      97 %                             Systolic Blood Pressure   132 mmHg                             Diastolic Blood Pressure  87 mmHg     General:  Alert and oriented, No acute distress, pleasant white female.    Eye:  Normal conjunctiva, Vision unchanged.    HENT:  Normocephalic, Normal hearing, Oral mucosa is moist.    Neck:  Supple, Non-tender, No jugular venous distention, No lymphadenopathy.    Respiratory:  Lungs are clear to auscultation, Respirations are non-labored, Breath sounds are equal, left chest wall mediport in place, healed well.    Cardiovascular:  Normal rate, Regular rhythm, No murmur, No gallop, Normal peripheral perfusion, No edema.    Gastrointestinal:  Soft, Non-tender, Non-distended, Normal bowel sounds, No organomegaly, scattered laparoscopic incisions healed well, small midline lower abdominal incision healed.    Lymphatics:  No lymphadenopathy neck, axilla, groin.    Musculoskeletal:  Normal range of motion, Normal strength, No deformity, Normal gait.    Integumentary:  Warm, Intact, No rash.    Neurologic:  Alert, Oriented, Normal sensory, Normal motor function.    Psychiatric:  Cooperative, Appropriate mood & affect.    Cognition and Speech:  Oriented, Speech clear and coherent, Functional cognition intact.    ECOG Performance Scale: 1- Strenuous physical activity restricted; fully ambulatory and  able to carry out light work.      Review / Management   Results review:  Lab results   9/29/2021 8:48 CDT       POC TCO2                  23.0 mmol/L  LOW                             POC Hb                    13.9 mg/dL                             POC Hct                   41.0 %                             POC Sodium                142 mmol/L                             POC Potassium             3.8 mmol/L                             POC Chloride              105 mmol/L                             POC Ion Calcium           1.27 mmol/L                             POC Glucose               129 mg/dL  HI                             POC BUN                   13.0 mg/dL                             POC Creatinine            0.8 mg/dL                             POC AGAP                  18.0  NA    9/29/2021 8:41 CDT       WBC                       6.5 x10(3)/mcL                             RBC                       4.04 x10(6)/mcL  LOW                             Hgb                       13.1 gm/dL                             Hct                       40.2 %                             Platelet                  94 x10(3)/mcL  LOW                             MCV                       99.5 fL  HI                             MCH                       32.4 pg  HI                             MCHC                      32.6 gm/dL  LOW                             RDW                       14.9 %                             MPV                       9.1 fL  LOW                             Abs Neut                  4.14 x10(3)/mcL                             NEUT%                     63.9 %  NA                             NEUT#                     4.1 x10(3)/mcL                             LYMPH%                    21.1 %  NA                             LYMPH#                    1.4 x10(3)/mcL                             MONO%                     7.1 %  NA                             MONO#                     0.5 x10(3)/mcL                              EOS%                      1.1 %  NA                             EOS#                      0.1 x10(3)/mcL                             BASO%                     0.3 %  NA                             BASO#                     0.0 x10(3)/mcL  .       Impression and Plan   Diagnosis     Malignant neoplasm of sigmoid colon (SOK34-AV C18.7).     Iron deficiency anemia (LMM73-CM D50).     Agranulocytosis secondary to cancer chemotherapy (LNX62-FM D70.1).     Plan   Patient with Stage IIIC Sigmoid colon cancer s/p surgery done 5/18/21. Tumor measured 3.5cm, T3 lesion, Grade 2 with vascular invasion, 7/16 LNs positive, YURIY.  Initial CT C/A/P w/o evidence for any distant metastatic disease.  Per NCCN guidelines, adjuvant treatment recommended with 6 months of FOLFOX chemotherapy. 6 months recommended as opposed to 3 months due to N2 disease, higher risk.  Discussed rationale for adjuvant treatment to decrease risk of recurrence and improve survival. Estimated OS around 75% with treatment.  Received IV Injectafer completed 6/18/21 for iron deficiency anemia.    Patient started cycle 1 FOLFOX on 6/16/21.   Cycle 2 delayed by 1 due to neutropenia, given on 7/1/21 with Neulasta and IV fluids added on day 3. She tolerated well aside from fatigue and nausea.  Cycle 3 delayed by 1 week for thrombocytopenia, 15% dose reduction of oxaliplatin.  Cycle 5 has delayed due to thrombocytopenia--15% dose reduction in 5FU.  Cycle 6 delayed. Further dose reduction in Oxaliplatin and 5FU by 10% (total 25%).  Patient presents for Cycle # 7 today.   Labs show platelets are 94,000 today. CBC otherwise good. CMP pending.   Additional workup did reveal positive platelet antibodies. We discussed starting Nplate weekly next week in order to keep her platelets up to receive her chemotherapy treatments.   She will need to continue with weekly labs.   RTC in 2 weeks T/D visit with labs and will be due for cycle 8.    Plan to repeat  CT C/A/P after chemotherapy is complete and continue every 6 months for up to 5 years.  All questions answered at this time.      MICHAELLE CamejoPC

## 2022-04-29 NOTE — PROGRESS NOTES
Patient:   Melissa Jones            MRN: [id number]            FIN: [id number]               Age:   51 years     Sex:  Female     :  1970   Associated Diagnoses:   Malignant neoplasm of sigmoid colon; Iron deficiency anemia; Agranulocytosis secondary to cancer chemotherapy   Author:   Kamron Cordero      Surgeon: Dr. Cruz Farmer    1. Sigmoid Colon Cancer--Stage IIIC ([id number])--Diagnosed 21  Biopsy/pathology:  Colonoscopy done 21--circumferential, ulcerated, sigmoid colon mass at 30cm, biopsy positive for moderately differentiated invasive adenocarcinoma, MMR intact(YURIY), scope did traverse the lesion with some maneuvering, internal hemorrhoids noted, normal mucosa in terminal ileum, otherwise normal examination.  Surgery/pathology:  Laparoscopic sigmoid colectomy done 21--adenocarcinoma, moderately differentiated, measuring 3.5cm with invasion through the muscularis propria into pericolic adipose tissue, margins clear, / regional lymph nodes positive for metastatic adenocarcinoma, focal discontinuous extramural tumor extension c/w vascular invasion (pT3N2b).  Imagin. CT A/P w/ contrast done 21--very mild stranding along a short segment of sigmoid colon may correspond with the primary malignancy, no metastatic disease.   2. CT chest done 21--No acute abnormality. No CT evidence of metastatic disease.    2. Iron Deficiency Anemia--21      3. Idiopathic Thrombocytopenia Purpura - 21  Positive for HLA Class 1A and Anti IIa/IIIa antibodies on 21.     Procedures:   1. EGD done 21--erythema in antrum, c/w gastritis biopsy showing chronic gastritis with focal mild activity, H. pylori negative, normal mucosa in duodenum, normal esophagus and GE junction, moderate amount of bile in stomach.  2. Left subclavian mediport placed 6/15/21.    CEA:  21--3.01  6/10/21--<1.73    Treatment history:  1. Laparoscopic sigmoid colectomy 21.  2. IV  Injectafer 6/11/21 and 6/18/21.     Treatment plan:   1. FOLFOX X 12 cycles started 6/16/21, Neulasta and IV fluids added on day 3 with cycle 2  Cycle 3 delayed by 1 week due to thrombocytopenia, dose reduction of Oxaliplatin by 15%. Cycle 5 delayed due to thrombocytopenia -15% dose reduction 5FU.   Cycle 6 delayed due to thrombocytopenia - additional 10% reduction in 5FU and Oxaliplatin (total 25% dose reduction).  Due for Cycle # 10 on 11/9/21. IVF on day 3.     2. Nplate weekly. Started on 10/6/21.       Visit Information   Visit type:  Scheduled follow-up.    Accompanied by:  No one.       Chief Complaint   11/9/2021 8:22 CST       Pt reports no new complaints today.        Interval History   Current complaint.   Patient presents for follow-up of colon cancer. She is due for cycle 10 of FOLFOX today. Her mother is with her today. She is feeling good today. She did not have the loose stools after this treatment that she had with Cycle 9. Likely she had a viral gastroenteritis. Her potassium is now normal. Her weight is stable today. Only having mild neuropathy symptoms at this time. Continues on Nplate weekly since 10/6/21. Platelets today are 111,000. No other problems reported today.       Review of Systems   Constitutional:  Fatigue, No fever, No chills, No weakness.    Eye:  No recent visual problem.    Ear/Nose/Mouth/Throat:  No decreased hearing, No nasal congestion, No sore throat.    Respiratory:  No shortness of breath, No cough, No wheezing.    Cardiovascular:  No chest pain, No palpitations, No peripheral edema.    Gastrointestinal:  Nausea, No diarrhea, No heartburn, No abdominal pain.    Hematology/Lymphatics:  No bruising tendency, No bleeding tendency.    Immunologic:  Chemotherapy.    Musculoskeletal:  No back pain, No joint pain.    Integumentary:  No rash, No skin lesion.    Neurologic:  Alert and oriented X4, No confusion, No headache.    Psychiatric:  No anxiety, No depression.    All other  systems are negative      Health Status   Allergies:    Allergic Reactions (Selected)  No Known Allergies  No Known Medication Allergies   Current medications:  (Selected)   Prescriptions  Prescribed  Compazine 5 mg tab: 5 mg = 1 tab(s), Oral, TID, PRN PRN as needed for nausea/vomiting, # 30 tab(s), 1 Refill(s), Pharmacy: [facility], 157, cm, Height/Length Dosing, 06/22/21 13:46:00 CDT, 70.3, kg, Weight Dosing, 06/22/21 13:46:00 CDT  Singulair 10 mg oral TABLET: 10 mg = 1 tab(s), Oral, Daily, # 30 tab(s), 3 Refill(s), Pharmacy: [facility], 157, cm, Height/Length Dosing, 07/28/21 9:52:00 CDT, 69.3, kg, Weight Dosing, 07/28/21 9:52:00 CDT  Documented Medications  Documented  Advil 200 mg oral tablet: 400 mg = 2 tab(s), Oral, q4hr, PRN PRN as needed for pain, # 120 tab(s), 0 Refill(s)  Claritin oral tablet: Daily, 0 Refill(s)  Prilosec 20 mg oral DR capsule: 20 mg = 1 cap(s), Oral, BID, PRN PRN heartburn, 0 Refill(s)  Zofran 8 mg oral tablet: See Instructions, PRN PRN as needed for nausea/vomiting, 1 tab(s) Oral BID for 3 days after chemotherapy and every 8 hours, 4 Refill(s)  azithromycin 250 mg oral tablet: Oral  nitrofurantoin macrocrystals-monohydrate 100 mg oral capsule: 100 mg = 1 cap(s), Oral, BID  phenazopyridine 100 mg oral tablet: 100 mg = 1 tab(s), Oral, TID  traMADol 50 mg oral tablet: 50 mg = 1 tab(s), Oral, q6hr      Histories   Past Medical History:    Resolved  Right ankle sprain ([id number]):  Resolved.  None ([id number]):  Resolved.  Cervical spondylosis ([id number]):  Resolved.   Family History:    Entire family history is negative.   Procedure history:    Catheter Insertion Mediport (.) on 6/15/2021 at 50 Years.  Comments:  6/15/2021 8:18 Christy Garcia  auto-populated from documented surgical case  Colon Resection Descending Laparoscopic (Left) on 5/18/2021 at 50 Years.  Comments:  5/18/2021 16:31 Christy Garcia  auto-populated from documented  surgical case   section ([id number]).  Colonoscopy ([id number]).  Extraction of wisdom tooth ([id number]).   Social History     Alcohol  Use: Never    Substance Use  Use: Never    Tobacco  Use: Never     Spiritual/Cultural  Mu-ism Preference Evangel[employer].        Physical Examination   Vital Signs   2021 8:24 CST       Temperature Oral          36.6 DegC                             Temperature Oral (calculated)             97.88 DegF                             Peripheral Pulse Rate     76 bpm                             Respiratory Rate          14 br/min                             SpO2                      97 %                             Oxygen Therapy            Room air                             Systolic Blood Pressure   117 mmHg                             Diastolic Blood Pressure  78 mmHg                             Blood Pressure Location   Left arm                             Manual Cuff BP            No                             O2 SAT at rest            97 %     General:  Alert and oriented, No acute distress, pleasant white female.    Eye:  Normal conjunctiva, Vision unchanged.    HENT:  Normocephalic, Normal hearing, Oral mucosa is moist.    Neck:  Supple, Non-tender, No jugular venous distention, No lymphadenopathy.    Respiratory:  Lungs are clear to auscultation, Respirations are non-labored, Breath sounds are equal, left chest wall mediport in place, healed well.    Cardiovascular:  Normal rate, Regular rhythm, No murmur, No gallop, Normal peripheral perfusion, No edema.    Gastrointestinal:  Soft, Non-tender, Non-distended, Normal bowel sounds, No organomegaly, scattered laparoscopic incisions healed well, small midline lower abdominal incision healed.    Lymphatics:  No lymphadenopathy neck, axilla, groin.    Musculoskeletal:  Normal range of motion, Normal strength, No deformity, Normal gait.    Integumentary:  Warm, Intact, No rash.    Neurologic:  Alert, Oriented, Normal sensory,  Normal motor function.    Psychiatric:  Cooperative, Appropriate mood & affect.    Cognition and Speech:  Oriented, Speech clear and coherent, Functional cognition intact.    ECOG Performance Scale: 1- Strenuous physical activity restricted; fully ambulatory and able to carry out light work.      Review / Management   Results review:  Lab results   11/9/2021 8:23 CST       POC TCO2                  21.0 mmol/L  LOW                             POC Hb                    12.2 mg/dL                             POC Hct                   36.0 %  LOW                             POC Sodium                143 mmol/L                             POC Potassium             3.7 mmol/L                             POC Chloride              108 mmol/L                             POC Ion Calcium           1.24 mmol/L                             POC Glucose               131 mg/dL  HI                             POC BUN                   11.0 mg/dL                             POC Creatinine            0.7 mg/dL                             POC AGAP                  18.0  NA    11/9/2021 8:16 CST       WBC                       6.6 x10(3)/mcL                             RBC                       3.67 x10(6)/mcL  LOW                             Hgb                       11.9 gm/dL  LOW                             Hct                       36.3 %  LOW                             Platelet                  111 x10(3)/mcL  LOW                             MCV                       98.9 fL  HI                             MCH                       32.4 pg  HI                             MCHC                      32.8 gm/dL  LOW                             RDW                       13.3 %                             MPV                       9.3 fL  LOW                             Abs Neut                  3.94 x10(3)/mcL                             NEUT%                     59.3 %  NA                             NEUT#                     3.9  x10(3)/mcL                             LYMPH%                    20.2 %  NA                             LYMPH#                    1.3 x10(3)/mcL                             MONO%                     9.8 %  NA                             MONO#                     0.6 x10(3)/mcL                             EOS%                      0.5 %  NA                             EOS#                      0.0 x10(3)/mcL                             BASO%                     0.2 %  NA                             BASO#                     0.0 x10(3)/mcL  .       Impression and Plan   Diagnosis     Malignant neoplasm of sigmoid colon (IPJ22-WN C18.7).     Iron deficiency anemia ([id number] D50).     Agranulocytosis secondary to cancer chemotherapy (ALU61-CI D70.1).     Plan   Patient with Stage IIIC Sigmoid colon cancer s/p surgery done 5/18/21. Tumor measured 3.5cm, T3 lesion, Grade 2 with vascular invasion, 7/16 LNs positive, YURIY.  Initial CT C/A/P w/o evidence for any distant metastatic disease.  Per NCCN guidelines, adjuvant treatment recommended with 6 months of FOLFOX chemotherapy. 6 months recommended as opposed to 3 months due to N2 disease, higher risk.  Discussed rationale for adjuvant treatment to decrease risk of recurrence and improve survival. Estimated OS around 75% with treatment.  Received IV Injectafer completed 6/18/21 for iron deficiency anemia.    Patient started cycle 1 FOLFOX on 6/16/21.   Cycle 2 delayed by 1 due to neutropenia, given on 7/1/21 with Neulasta and IV fluids added on day 3. She tolerated well aside from fatigue and nausea.  Cycle 3 delayed by 1 week for thrombocytopenia, 15% dose reduction of oxaliplatin.  Cycle 5 has delayed due to thrombocytopenia--15% dose reduction in 5FU.  Cycle 6 delayed. Further dose reduction in Oxaliplatin and 5FU by 10% (total 25%).    Additional workup did reveal positive platelet antibodies. Started Nplate weekly on 10/6/21.   Patient presents for treatment visit.  Cycle 10 today.   Labs show platelets are 111,000 today. CBC otherwise good. Potassium now normal.   She will need to continue with weekly labs.   RTC in 2 weeks T/D visit with labs and will be due for cycle 11. She would like to move treatment to Wednesday - Friday due to holiday.     Plan to repeat CT C/A/P after chemotherapy is complete and continue every 6 months for up to 5 years.  All questions answered at this time.      Kamron Garza, NELY

## 2022-04-29 NOTE — OP NOTE
DATE OF SURGERY:    06/15/2021    SURGEON:  Cruz Farmer MD    PREOPERATIVE DIAGNOSES:    1. Poor venous access.   2. Colon cancer.    POSTOPERATIVE DIAGNOSES:    1. Poor venous access.   2. Colon cancer.    PROCEDURES:    1. Left subclavian MediPort placement.  2. Intraoperative fluoroscopy.    ANESTHESIA:  Local MAC.    ESTIMATED BLOOD LOSS:  Less than 10 cc.    SPECIMENS:  None.    COMPLICATIONS:  None.    PROCEDURE IN DETAIL:  After informed consent was obtained, the patient was brought to the operating room and placed in the supine position.  Next, the anterior chest wall was prepped and draped in sterile surgical fashion.  Intravenous sedation was administered by a member of the anesthesia team.  Marcaine 0.25% with epinephrine was used for local anesthesia.  The patient was placed in slight Trendelenburg position.  Percutaneous access to the left subclavian vein was achieved with the provided 18-gauge Seldinger needle.  Dark nonpulsatile blood was encountered.  Guidewire was passed through the needle into the vena cava, confirmed with intraoperative fluoroscopy.  The needle was removed, and a linear skin incision was made over the left chest wall at the level of the guidewire with a 15 blade.  Incision was deepened with electrocautery, and a subcutaneous reservoir was created using sharp and blunt dissection.  The dilator and introducing sheath were then passed over the guidewire into the superior vena cava under fluoroscopy.  The dilator and wire were removed.  A 6-Yoruba catheter was passed through the introducing sheath into the vena cava.  Sheath was peeled away and discarded.  The tip of the catheter was situated in the superior vena cava above the level of the right atrium with fluoroscopy.  The catheter was cut to the appropriate length and attached to the port.  The port was placed within the subcutaneous reservoir.  It was secured to the chest wall with 2-0 Vicryl suture.  The port was  accessed with the Moran needle.  It aspirated and flushed very easily.  It was left as access as she will begin chemotherapy tomorrow.  The incision was closed in a layered fashion using interrupted 2-0 Vicryl suture to reapproximate the subcutaneous tissue and running Monocryl suture in a subcuticular fashion to reapproximate the skin edges.  Incision was cleaned and sterile dressing was applied.  The patient tolerated the procedure well.  There were no complications.  Subsequently, she was transferred back to day surgery where a postoperative chest x-ray will be obtained.        ______________________________  MD JUAN Hansen/  DD:  06/15/2021  Time:  08:45AM  DT:  06/15/2021  Time:  08:55AM  Job #:  [id number]    cc: Belén Underwood MD

## 2022-04-29 NOTE — PROGRESS NOTES
Patient:   Melissa Jones            MRN: [id number]            FIN: [id number]               Age:   51 years     Sex:  Female     :  1970   Associated Diagnoses:   Malignant neoplasm of sigmoid colon; Iron deficiency anemia; Agranulocytosis secondary to cancer chemotherapy   Author:   Kamron Cordero      Surgeon: Dr. Cruz Farmer    1. Sigmoid Colon Cancer--Stage IIIC ([id number])--Diagnosed 21  Biopsy/pathology:  Colonoscopy done 21--circumferential, ulcerated, sigmoid colon mass at 30cm, biopsy positive for moderately differentiated invasive adenocarcinoma, MMR intact(YURIY), scope did traverse the lesion with some maneuvering, internal hemorrhoids noted, normal mucosa in terminal ileum, otherwise normal examination.  Surgery/pathology:  Laparoscopic sigmoid colectomy done 21--adenocarcinoma, moderately differentiated, measuring 3.5cm with invasion through the muscularis propria into pericolic adipose tissue, margins clear, / regional lymph nodes positive for metastatic adenocarcinoma, focal discontinuous extramural tumor extension c/w vascular invasion (pT3N2b).  Imagin. CT A/P w/ contrast done 21--very mild stranding along a short segment of sigmoid colon may correspond with the primary malignancy, no metastatic disease.   2. CT chest done 21--No acute abnormality. No CT evidence of metastatic disease.    2. Iron Deficiency Anemia--21      3. Idiopathic Thrombocytopenia Purpura - 21  Positive for HLA Class 1A and Anti IIa/IIIa antibodies on 21.     Procedures:   1. EGD done 21--erythema in antrum, c/w gastritis biopsy showing chronic gastritis with focal mild activity, H. pylori negative, normal mucosa in duodenum, normal esophagus and GE junction, moderate amount of bile in stomach.  2. Left subclavian mediport placed 6/15/21.    CEA:  21--3.01  6/10/21--<1.73    Treatment history:  1. Laparoscopic sigmoid colectomy 21.  2. IV  Injectafer 6/11/21 and 6/18/21.     Treatment plan:   1. FOLFOX X 12 cycles started 6/16/21, Neulasta and IV fluids added on day 3 with cycle 2  Cycle 3 delayed by 1 week due to thrombocytopenia, dose reduction of Oxaliplatin by 15%. Cycle 5 delayed due to thrombocytopenia -15% dose reduction 5FU.   Cycle 6 delayed due to thrombocytopenia - additional 10% reduction in 5FU and Oxaliplatin (total 25% dose reduction).  Due for Cycle # 12 on 12/8/21. IVF on day 3.     2. Nplate weekly. Started on 10/6/21. Plan to stop after 12/8/21.       Visit Information   Visit type:  Scheduled follow-up.    Accompanied by:  No one.       Chief Complaint   12/8/2021 8:35 CST       Pt reports having a head cold and a temp of 100 last night. Coughing, congestion and headache are her symptoms. Pt is taking Tylenol cold and sinus OTC PRN.  (Modified)       Interval History   Current complaint.   Patient presents for follow-up of colon cancer. She is due for Final cycle 12 of FOLFOX today. She is not feeling well today. States she started having headaches, nasal congestion and sinus pressure/pain since Monday. She had a low-grade fever last night ~ 100.0. Also had a note from her sons class about a positive COVID-19. She has lost a few lbs. Dr. Underwood made aware and recommends she get tested for COVID-19 today before receiving chemotherapy. If test is negative then we can proceed with treatment later. She will go and get tested right now and call us back with results. If positive then she will need the antibody infusion. Only having mild neuropathy symptoms at this time and mostly when she touches something cold. Continues on Nplate weekly since 10/6/21. Platelets today are 122,000. Today will be her last Nplate dose. No other problems reported today.       Review of Systems   Constitutional:  Fatigue, No fever, No chills, No weakness.    Eye:  No recent visual problem.    Ear/Nose/Mouth/Throat:  Nasal congestion, Sinus pain, clear  drainage, No decreased hearing, No sore throat.         Nasal discharge: Moderate amount, Clear.         Nasal obstruction: Bilateral.    Respiratory:  No shortness of breath, No cough, No wheezing.    Cardiovascular:  No chest pain, No palpitations, No peripheral edema.    Gastrointestinal:  Nausea, Constipation, No diarrhea, No heartburn, No abdominal pain.    Hematology/Lymphatics:  No bruising tendency, No bleeding tendency.    Immunologic:  Chemotherapy.    Musculoskeletal:  No back pain, No joint pain.    Integumentary:  No rash, No skin lesion.    Neurologic:  Alert and oriented X4, No confusion, No headache.    Psychiatric:  No anxiety, No depression.    All other systems are negative      Health Status   Allergies:    Allergic Reactions (Selected)  No Known Allergies  No Known Medication Allergies   Current medications:  (Selected)   Outpatient Medications  Future  Aloxi (for IVPB): 0.25 mg, form: Injection, IV Piggyback, Once-chemo, Infuse over: 20 minute(s), *Est. first dose 12/07/21 8:00:00 CST, *Est. stop date 12/07/21 8:00:00 CST, Future Order, Days 1  Benadryl (for IVPB): 25 mg, form: Injection, IV Piggyback, Once-chemo, Infuse over: 20 minute(s), *Est. first dose 12/07/21 8:00:00 CST, *Est. stop date 12/07/21 8:00:00 CST, Future Order, Days 1  Heparin Flush 100 U/mL - 5 mL: 500 units, form: Injection, IV Push, Once-chemo, *Est. first dose 12/09/21 8:00:00 CST, *Est. stop date 12/09/21 8:00:00 CST, Routine, Days 3, Future Order  Leucovorin (for IVPB): 700 mg, form: Powder-Inj, IV Piggyback, Once-chemo, Infuse over: 2 hr, *Est. first dose 12/07/21 8:20:00 CST, *Est. stop date 12/07/21 8:20:00 CST, Future Order, Days 1  Normal Saline (0.9% NS) IV: 1,000 mL, IV Piggyback, Once, 500 mL/hr, *Est. start date 12/09/21 8:00:00 CST, *Est. stop date 12/09/21 8:00:00 CST, Days 3  Nplate: 70 mcg, form: Injection, Subcutaneous, Once-chemo, first dose 12/08/21 9:00:00 CST, stop date 12/08/21 9:00:00 CST, Routine,  The initial dose is 1 mcg/kg SC once weekly (based on actual body weight). Increase the weekly dose in increments of 1 mcg/kg...  Udenyca: 6 mg, form: Soln, Subcutaneous, Once-chemo, *Est. first dose 12/09/21 8:00:00 CST, *Est. stop date 12/09/21 8:00:00 CST, Routine, Diagnosis: Drug Induced Neutropenia, Days 3, Future Order  dexamethasone (for IVPB): 10 mg, form: Soln, IV Piggyback, Once-chemo, Infuse over: 20 minute(s), *Est. first dose 12/07/21 8:00:00 CST, *Est. stop date 12/07/21 8:00:00 CST, Future Order, Days 1  fluorouracil (for IVPB) -CCA: 3,000 mg, form: Injection, IV Piggyback, Once-chemo, Infuse over: 46 hr, *Est. first dose 12/07/21 10:50:00 CST, *Est. stop date 12/07/21 10:50:00 CST, Future Order, Days 1  fluorouracil (for IVPB) -CCA: 500 mg, form: Injection, IV Piggyback, Once-chemo, Infuse over: 30 minute(s), *Est. first dose 12/07/21 10:20:00 CST, *Est. stop date 12/07/21 10:20:00 CST, Future Order, Days 1  fosaprepitant (for IVPB): 150 mg, form: Powder-Inj, IV Piggyback, Once-chemo, Infuse over: 30 minute(s), *Est. first dose 12/07/21 8:00:00 CST, *Est. stop date 12/07/21 8:00:00 CST, Future Order, Days 1  oxaliplatin (for IVPB): 100 mg, form: Injection, IV Piggyback, Once-chemo, Infuse over: 2 hr, *Est. first dose 12/07/21 8:20:00 CST, *Est. stop date 12/07/21 8:20:00 CST, Future Order, Days 1  Prescriptions  Prescribed  Compazine 5 mg tab: 5 mg = 1 tab(s), Oral, TID, PRN PRN as needed for nausea/vomiting, # 30 tab(s), 1 Refill(s), Pharmacy: [facility], 157, cm, Height/Length Dosing, 11/09/21 8:28:00 CST, 69.8, kg, Weight Dosing, 11/09/21 8:28:00 CST  Singulair 10 mg oral TABLET: 10 mg = 1 tab(s), Oral, Daily, # 30 tab(s), 3 Refill(s), Pharmacy: [facility], 157, cm, Height/Length Dosing, 11/09/21 8:28:00 CST, 69.8, kg, Weight Dosing, 11/09/21 8:28:00 CST  Documented Medications  Documented  Advil 200 mg oral tablet: 400 mg = 2 tab(s), Oral, q4hr, PRN PRN as needed for pain, # 120  tab(s), 0 Refill(s)  Claritin oral tablet: Daily, 0 Refill(s)  Prilosec 20 mg oral DR capsule: 20 mg = 1 cap(s), Oral, BID, PRN PRN heartburn, 0 Refill(s)  Zofran 8 mg oral tablet: See Instructions, PRN PRN as needed for nausea/vomiting, 1 tab(s) Oral BID for 3 days after chemotherapy and every 8 hours, 4 Refill(s)  traMADol 50 mg oral tablet: 50 mg = 1 tab(s), Oral, q6hr      Histories   Past Medical History:    Resolved  Right ankle sprain ([id number]):  Resolved.  None ([id number]):  Resolved.  Cervical spondylosis ([id number]):  Resolved.   Family History:    Entire family history is negative.   Procedure history:    Catheter Insertion Mediport (.) on 6/15/2021 at 50 Years.  Comments:  6/15/2021 8:18 Christy Garcia  auto-populated from documented surgical case  Colon Resection Descending Laparoscopic (Left) on 2021 at 50 Years.  Comments:  2021 16:31 Christy Garcia  auto-populated from documented surgical case   section ([id number]).  Colonoscopy ([id number]).  Extraction of wisdom tooth ([id number]).   Social History     Alcohol  Use: Never    Substance Use  Use: Never    Tobacco  Use: Never     Spiritual/Cultural  Protestant Preference Rastafarian.        Physical Examination   Vital Signs   2021 8:36 CST       Temperature Oral          36.8 DegC                             Temperature Oral (calculated)             98.24 DegF                             Peripheral Pulse Rate     86 bpm                             Respiratory Rate          14 br/min                             SpO2                      97 %                             Oxygen Therapy            Room air                             Systolic Blood Pressure   122 mmHg                             Diastolic Blood Pressure  89 mmHg                             Blood Pressure Location   Left arm                             Manual Cuff BP            No     General:  Alert and oriented, No acute  distress, pleasant white female.    Eye:  Normal conjunctiva, Vision unchanged.    HENT:  Normocephalic, Normal hearing, Oral mucosa is moist.         Nose: Both nostrils, Discharge ( Moderate amount, Clear ).         Sinus: Bilateral, Frontal sinus, Maxillary sinus, Tenderness.    Neck:  Supple, Non-tender, No jugular venous distention, No lymphadenopathy.    Respiratory:  Lungs are clear to auscultation, Respirations are non-labored, Breath sounds are equal, left chest wall mediport in place, healed well.    Cardiovascular:  Normal rate, Regular rhythm, No murmur, No gallop, Normal peripheral perfusion, No edema.    Gastrointestinal:  Soft, Non-tender, Non-distended, Normal bowel sounds, No organomegaly, scattered laparoscopic incisions healed well, small midline lower abdominal incision healed.    Lymphatics:  No lymphadenopathy neck, axilla, groin.    Musculoskeletal:  Normal range of motion, Normal strength, No deformity, Normal gait.    Integumentary:  Warm, Intact, No rash.    Neurologic:  Alert, Oriented, Normal sensory, Normal motor function.    Psychiatric:  Cooperative, Appropriate mood & affect.    Cognition and Speech:  Oriented, Speech clear and coherent, Functional cognition intact.    ECOG Performance Scale: 1- Strenuous physical activity restricted; fully ambulatory and able to carry out light work.      Review / Management   Results review:  Lab results   12/8/2021 8:16 CST       WBC                       8.5 x10(3)/mcL                             RBC                       3.73 x10(6)/mcL  LOW                             Hgb                       12.0 gm/dL                             Hct                       37.3 %                             Platelet                  122 x10(3)/mcL  LOW                             MCV                       100.0 fL  HI                             MCH                       32.2 pg  HI                             MCHC                      32.2 gm/dL  LOW                              RDW                       14.1 %                             MPV                       9.4 fL                             Abs Neut                  5.49 x10(3)/mcL                             NEUT%                     64.8 %  NA                             NEUT#                     5.5 x10(3)/mcL                             LYMPH%                    14.8 %  NA                             LYMPH#                    1.3 x10(3)/mcL                             MONO%                     9.9 %  NA                             MONO#                     0.8 x10(3)/mcL                             EOS%                      0.2 %  NA                             EOS#                      0.0 x10(3)/mcL                             BASO%                     0.1 %  NA                             BASO#                     0.0 x10(3)/mcL  .       Impression and Plan   Diagnosis     Malignant neoplasm of sigmoid colon (NTS09-KP C18.7).     Iron deficiency anemia (GIY36-NN D50).     Agranulocytosis secondary to cancer chemotherapy (WGO82-IV D70.1).     Plan   Patient with Stage IIIC Sigmoid colon cancer s/p surgery done 5/18/21. Tumor measured 3.5cm, T3 lesion, Grade 2 with vascular invasion, 7/16 LNs positive, YURIY.  Initial CT C/A/P w/o evidence for any distant metastatic disease.  Per NCCN guidelines, adjuvant treatment recommended with 6 months of FOLFOX chemotherapy. 6 months recommended as opposed to 3 months due to N2 disease, higher risk.  Discussed rationale for adjuvant treatment to decrease risk of recurrence and improve survival. Estimated OS around 75% with treatment.  Received IV Injectafer completed 6/18/21 for iron deficiency anemia.    Patient started cycle 1 FOLFOX on 6/16/21.   Cycle 2 delayed by 1 due to neutropenia, given on 7/1/21 with Neulasta and IV fluids added on day 3. She tolerated well aside from fatigue and nausea.  Cycle 3 delayed by 1 week for thrombocytopenia, 15% dose reduction of  oxaliplatin.  Cycle 5 has delayed due to thrombocytopenia--15% dose reduction in 5FU.  Cycle 6 delayed. Further dose reduction in Oxaliplatin and 5FU by 10% (total 25%).    Additional workup did reveal positive platelet antibodies. Started Nplate weekly on 10/6/21.   Patient presents for treatment visit. Final Cycle 12 today.   Labs show platelets are 122,000 today. CBC and BMP otherwise good.  Today will be last dose of Nplate as well.   Patient will low-grade fevers and sinus congestion/pressure. Recommend she get rapid tested for COVID-19 now and contact us with results. If negative, then ok to return to clinic for treatment and will give her antibiotics for sinusitis. If positive then will need antibody infusion and delay chemotherapy by 10 days.   Patient will contact us with results.   Labs next week.   Plan to repeat CT C/A/P after chemotherapy is complete and continue every 6 months for up to 5 years. Will schedule her CT scans at her next appointment.   All questions answered at this time.      TIFFANIE Camejo

## 2022-04-29 NOTE — PROGRESS NOTES
Patient:   GONZALO GILL            MRN: [id number]            FIN: [id number]               Age:   48 years     Sex:  MALE     :  67   Associated Diagnoses:   None   Author:   JESSICA JONES      History of Present Illness     [facility] HOSPITALIST DISCHARGE SUMMARY:    DATE OF ADMISSION: 17  DATE OF DISCHARGE: 17    Primary Care Physician: Dr. Valdez  DATE PCP NOTIFIED:  METHOD OF NOTIFICATION:    CONSULTING PHYSICIAN(s):   Dr. Tien Harden (General Surgery)   Dr. Malik Holcomb (Nephrology)   Dr. Varinder Lau (GI)   Dr. Linda Goode (ID)     DISCHARGE DIAGNOSES:   Acute cholecystitis with sepsis (POA)   Acute kidney injury on chronic kidney disease stage 4, with progression to ESRD   Anemia due to acute blood loss on anemia of chronic kidney disease   Hypokalemia  Hypomagnesemia  Acute respiratory failure   Sacral pressure ulcer (POA)   Severe protein calorie malnutrition   Insulin-requiring type 2 diabetes mellitus   Primary hypertension  Hx of CVA with residual left hemiplegia   HFE mutation (heterozygous)   Major depression   Obesity     PROCEDURES PERFORMED DURING ADMISSION AND DATES PERFORMED:   IR cholestotomy tube placement on     TEST RESULTS PENDING AT DISCHARGE AND PROBLEMS NEEDING F/U:   IR cholangiogram through marcelle tube on  - if patent, clamp tube and repeat cholangiogram on  and if remains patent, remove tube.      HOSPITAL COURSE:  This is a 48 year old male, a patient of Dr. Valdez, with past medical history of insuling requiring diabetes mellitus type 2, history CVA with left hemiplegia, primary hypertension, Major depression, chronic kidney disease stage 4, and obesity, who presented from nursing home with chief complaint of fever and right-sided abdominal pain onset 2 days prior to arrival. In ER patient had temperature of 38.5°C, HR 84, RR 16, SPO2 93%, /70.  Labs showed WBC 20.1, hemoglobin 11.5, hematocrit 34.8, platelets 225, sodium 136,     Patient:   Melissa Jones            MRN: [id number]            FIN: [id number]               Age:   51 years     Sex:  Female     :  1970   Associated Diagnoses:   Malignant neoplasm of sigmoid colon; Iron deficiency anemia; Agranulocytosis secondary to cancer chemotherapy   Author:   Kamron Cordero      Surgeon: Dr. Cruz Farmer    1. Sigmoid Colon Cancer--Stage IIIC (T2A1mD9)--Diagnosed 21  Biopsy/pathology:  Colonoscopy done 21--circumferential, ulcerated, sigmoid colon mass at 30cm, biopsy positive for moderately differentiated invasive adenocarcinoma, MMR intact(YURIY), scope did traverse the lesion with some maneuvering, internal hemorrhoids noted, normal mucosa in terminal ileum, otherwise normal examination.  Surgery/pathology:  Laparoscopic sigmoid colectomy done 21--adenocarcinoma, moderately differentiated, measuring 3.5cm with invasion through the muscularis propria into pericolic adipose tissue, margins clear,  regional lymph nodes positive for metastatic adenocarcinoma, focal discontinuous extramural tumor extension c/w vascular invasion (pT3N2b).  Imagin. CT A/P w/ contrast done 21--very mild stranding along a short segment of sigmoid colon may correspond with the primary malignancy, no metastatic disease.   2. CT chest done 21--No acute abnormality. No CT evidence of metastatic disease.    2. Iron Deficiency Anemia--21    Procedures:   1. EGD done 21--erythema in antrum, c/w gastritis biopsy showing chronic gastritis with focal mild activity, H. pylori negative, normal mucosa in duodenum, normal esophagus and GE junction, moderate amount of bile in stomach.  2. Left subclavian mediport placed 6/15/21.    CEA:  21--3.01  6/10/21--<1.73    Treatment history:  1. Laparoscopic sigmoid colectomy 21.  2. IV Injectafer 21 and 21.     Treatment plan:   FOLFOX X 12 cycles started 21, Neulasta and IV fluids added on  "day 3 with cycle 2  Cycle 3 delayed by 1 week due to thrombocytopenia, dose reduction of Oxaliplatin by 15%  Cycle 5 has been held due to thrombocytopenia -15% dose reduction 5FU planned.       Visit Information   Visit type:  Scheduled follow-up.    Accompanied by:  sister.       Chief Complaint   8/25/2021 8:55 CDT       scheduled follow up visit        Interval History   Current complaint.   Patient presents for follow-up of colon cancer. She was to start Cycle 5 of FOLFOX last week but her platelets were still low at 89,000. Treatment was delayed by 1 week. Today her platelets are 125,000. She has mild neutropenia with WBC of 3.7 and ANC 1900. Ok to proceed with treatment today. She is otherwise tolerating well. Mentions fatigue and constipation for several days. Also has sensations of something "stuck" in her throat for 1-2 days but this resolves. Her hair is thinning as well. No other problems reported.       Review of Systems   Constitutional:  Fatigue, No fever, No chills, No weakness.    Eye:  No recent visual problem.    Ear/Nose/Mouth/Throat:  allergies resolved, No decreased hearing, No nasal congestion, No sore throat.    Respiratory:  No shortness of breath, No cough, No wheezing.    Cardiovascular:  No chest pain, No palpitations, No peripheral edema.    Gastrointestinal:  Constipation, No nausea, No vomiting, No diarrhea, No heartburn, No abdominal pain.    Hematology/Lymphatics:  No bruising tendency, No bleeding tendency.    Immunologic:  Chemotherapy.    Musculoskeletal:  No back pain, No joint pain.    Integumentary:  No rash, No skin lesion.    Neurologic:  Alert and oriented X4, No confusion, No headache.    Psychiatric:  No anxiety, No depression.    All other systems are negative      Health Status   Allergies:    Allergic Reactions (Selected)  No Known Allergies  No Known Medication Allergies   Current medications:  (Selected)   Prescriptions  Prescribed  Compazine 5 mg tab: 5 mg = 1 tab(s), " potassium 4.6, chloride 101, BUN 59, creatinine 5.24, glucose 275, calcium 9.1, AST 96, , alkaline phosphatase 121, albumin 2.2, INR 1.2. CXR was normal. CT of the abdomen and pelvis without contrast showed inflammatory change in the RUQ around a distended gallbladder. A subsequent ultrasound of the gallbladder was concerning for acute cholecystitis. The patient was given flagyl, cefepime, dilaudid, insulin, acetaminophan and IV fluids in the ED and admitted for further care and management.     IR was consultd and cholestotomy tubes were placed on 11/9. The patient was continued on antibiotics with cefepime and flagyl given his allery to penicillin. General surgery and GI were consutled. An MRCP showed persistent mild gallbaldder wall thickening and pericholecystic inflammatory changes but no choledocholithiasis. Initial blood cultures  and repeat blood cultures were negative. The patient had persistent leukocytosis so ID was consulted. Vancomycin was added on 11/14 and then discontinued on 11/15 by ID. An NG tube was placed for symptomatic relief. The patient removed the NG tube overnight on 11/17. The patient was started on a clear liquid diet and advanced per GI and General Surgery. No surgical intervention was indicated. He was advanced to a renal diet. Leukocytosis resolved and the patient improved clinically. The case was discussed with Dr. Holcomb and Dr. Harden. It was determined that the patient will have a cholangiogram on 11/28. If the common bile duct is patent, the cholostotomy tube will be clamped and cholangiogram will be repeated on 12/5. If the CBD remains patent, then the tube will be removed. The patient will not be on further antibiotics upon discharge per ID as he received a greater than 10 day course and leukocytosis resolved.       The patient presented with an acute kidney injury on chronic kidney disease stage 4. Nephrology was consulted and determined that renal recovery was unlikely.  Oral, TID, PRN PRN as needed for nausea/vomiting, # 30 tab(s), 1 Refill(s), Pharmacy: [facility], 157, cm, Height/Length Dosing, 21 13:46:00 CDT, 70.3, kg, Weight Dosing, 21 13:46:00 CDT  Singulair 10 mg oral TABLET: 10 mg = 1 tab(s), Oral, Daily, # 30 tab(s), 3 Refill(s), Pharmacy: [facility], 157, cm, Height/Length Dosing, 21 9:52:00 CDT, 69.3, kg, Weight Dosing, 21 9:52:00 CDT  Documented Medications  Documented  Advil 200 mg oral tablet: 400 mg = 2 tab(s), Oral, q4hr, PRN PRN as needed for pain, # 120 tab(s), 0 Refill(s)  Claritin oral tablet: Daily, 0 Refill(s)  Prilosec 20 mg oral DR capsule: 20 mg = 1 cap(s), Oral, BID, PRN PRN heartburn, 0 Refill(s)  Zofran 8 mg oral tablet: See Instructions, PRN PRN as needed for nausea/vomiting, 1 tab(s) Oral BID for 3 days after chemotherapy and every 8 hours, 4 Refill(s)  traMADol 50 mg oral tablet: 50 mg = 1 tab(s), Oral, q6hr      Histories   Past Medical History:    Resolved  Right ankle sprain ([id number]):  Resolved.  None ([id number]):  Resolved.  Cervical spondylosis ([id number]):  Resolved.   Family History:    Entire family history is negative.   Procedure history:    Catheter Insertion Mediport (.) on 6/15/2021 at 50 Years.  Comments:  6/15/2021 8:18 GABY - Christy Madera  auto-populated from documented surgical case  Colon Resection Descending Laparoscopic (Left) on 2021 at 50 Years.  Comments:  2021 16:31 Christy Garcia  auto-populated from documented surgical case   section ([id number]).  Colonoscopy ([id number]).  Extraction of wisdom tooth ([id number]).   Social History     Alcohol  Use: Never    Substance Use  Use: Never    Tobacco  Use: Never     Spiritual/Cultural  [employer].        Physical Examination   Vital Signs   2021 8:55 CDT       Temperature Oral          36.7 DegC                             Temperature Oral (calculated)              The patient was started on dialysis MWF per Nephrology. He had an acute drop in hemoglobin with no obvious signs of bleeding, requiring 1 unit of pRBCs on 11/13. The patient will continue HD on a MWF schedule.     He had resuling acute respiratory failure from fluid overload and atelectasis. He was started on supplemental oxygen. The patient was then subsequently weaned off and oxygenating on room air.     PT wound care was consulted to manage a sacral pressure ulcer that was present on admission.      His chronic conditions were monitored and treated as needed.     The patient is now medically optimized for discharge to [facility]. He is to follow up for IR cholangiogram on 11/28 as highlighted above. The patient has verbalized understanding of his current condition and is agreeable to the disccharge plan.      CONDITION ON DISCHARGE: Stable  CODE STATUS:  Full Code      DISCHARGE INSTRUCTIONS:  DISCHARGE TO: [facility]   DIET: Renal   ACTIVITY: As tolerated   HOME HEALTH CARE RN/PT/OT/MSW/Wound/Ostomy Agency: No HHC required upon discharge  HOME OXYGEN: No home O2 needed upon discharge    FOLLOW-UP APPOINTMENTS:   IR cholangiogram on 11/28 as above     DISCHARGE MEDICATION LIST:    As per final med rec     NEW MEDICATIONS / MEDICATIONS DISCONTINUED / DOSAGE CHANGES DURING THIS ADMISSION:     TOTAL TIME SPENT: >30 minutes      Charting performed by chidi Blair for Dr. Leobardo Costello   .        Physical Examination                Vital signs   Vital Signs   11/21/17 07:30 Temperature - VS 36.4 deg_C  Normal    Temperature Source - VS Temporal    Heart/Pulse Rate 84  Normal    Pulse Source Monitor    Respiration Rate 16 breaths/min  Normal    SpO2 91 %  LOW    NIBP Systolic 143  HI    NIBP Diastolic 94  HI    NIBP Source Left Arm    NIBP    .      Medical Decision Making   Results review:    Vitals between:   20-NOV-2017 15:04:27   TO   21-NOV-2017 15:04:27                   LAST  98.06 DegF                             Peripheral Pulse Rate     76 bpm                             SpO2                      97 %                             Systolic Blood Pressure   124 mmHg                             Diastolic Blood Pressure  86 mmHg     General:  Alert and oriented, No acute distress, pleasant white female.    Eye:  Vision unchanged.    HENT:  Normocephalic, Normal hearing, Oral mucosa is moist.    Neck:  Supple, No jugular venous distention, No lymphadenopathy.    Respiratory:  Lungs are clear to auscultation, Respirations are non-labored, Breath sounds are equal, left chest wall mediport in place, healed well.    Cardiovascular:  Normal rate, Regular rhythm, No murmur, No gallop, Normal peripheral perfusion, No edema.    Gastrointestinal:  Soft, Non-tender, Non-distended, Normal bowel sounds, No organomegaly, scattered laparoscopic incisions healed well, small midline lower abdominal incision healed.    Lymphatics:  No lymphadenopathy neck, axilla, groin.    Musculoskeletal:  Normal range of motion, Normal strength, No deformity, Normal gait.    Integumentary:  Warm, Intact, No rash.    Neurologic:  Alert, Oriented, Normal sensory, Normal motor function.    Psychiatric:  Cooperative, Appropriate mood & affect.    Cognition and Speech:  Oriented, Speech clear and coherent, Functional cognition intact.    ECOG Performance Scale: 1- Strenuous physical activity restricted; fully ambulatory and able to carry out light work.      Review / Management   Results review:  Lab results   8/25/2021 8:32 CDT       WBC                       3.7 x10(3)/mcL  LOW                             RBC                       3.92 x10(6)/mcL  LOW                             Hgb                       11.8 gm/dL  LOW                             Hct                       37.1 %                             Platelet                  125 x10(3)/mcL  LOW                             MCV                       94.6 fL  HI         RESULT MINIMUM MAXIMUM  Temperature 36.4 36.4 36.8  Heart Rate 78 72 95  Respiratory Rate 20 16 20  NISBP           143 121 143  NIDBP           94 81 94  NIMBP           110 94 110  SpO2                    91 91 96  FiO2                    0.21 0.21 0.21    I & O between:  20-NOV-2017 15:04 TO 21-NOV-2017 15:04  Med Dosing Weight:  119.9  kg   08-NOV-2017  24 Hour Intake:   1852.00  ( 15.45 mL/kg )  24 Hour Output:   10.00           24 Hour Urine/Stool Output:   0.0  24 Hour Balance:   1842.00           24 Hour Urine Output:   0.00  ( 0.00 mL/kg/hr )                   Urine Count:  2.00       Labs between:  20-NOV-2017 15:04 to 21-NOV-2017 15:04    CBC:                 WBC  HgB  Hct  Plt  MCV  RDW   21-NOV-2017 5.7  (L) 8.0  (L) 25.7  252  84.8  (H) 15.7     DIFF:                 Seg  Neutroph//ABS  Lymph//ABS  Mono//ABS  EOS/ABS   21-NOV-2017 NOT APPLICABLE  64 // 3.6 24 // 1.4 10 // 0.6 2 // 0.1    POC GLU:                 Latest Result  Latest Date  Minimum  Min Date  Maximum  Max Date                             (H) 111  21-NOV-2017 (H) 111  21-NOV-2017 (H) 133  20-NOV-2017                                  .   Rationale:    All medical record entries made by the scribe were at my direction. I have reviewed the chart and agree that the record accurately reflects my personal performance of the history, physical exam, hospital course, and assessment and plan. .            Electronically Signed On 11/21/2017 16:32  __________________________________________________   JESSICA JONES                          MCH                       30.1 pg                             MCHC                      31.8 gm/dL  LOW                             RDW                       21.7 %  HI                             MPV                       9.1 fL  LOW                             Abs Neut                  1.91 x10(3)/mcL  LOW                             NEUT%                     51.2 %  NA                             NEUT#                     1.9 x10(3)/mcL  LOW                             LYMPH%                    34.6 %  NA                             LYMPH#                    1.3 x10(3)/mcL                             MONO%                     11.5 %  NA                             MONO#                     0.4 x10(3)/mcL                             EOS%                      1.1 %  NA                             EOS#                      0.0 x10(3)/mcL                             BASO%                     0.3 %  NA                             BASO#                     0.0 x10(3)/mcL  .       Impression and Plan   Diagnosis     Malignant neoplasm of sigmoid colon (TOF09-[place] C18.7).     Iron deficiency anemia (SDU20-JG D50).     Agranulocytosis secondary to cancer chemotherapy (HEU58-FB D70.1).     Plan   Patient with Stage IIIC Sigmoid colon cancer s/p surgery done 5/18/21. Tumor measured 3.5cm, T3 lesion, Grade 2 with vascular invasion, 7/16 LNs positive, YURIY.  Initial CT C/A/P w/o evidence for any distant metastatic disease.  Per NCCN guidelines, adjuvant treatment recommended with 6 months of FOLFOX chemotherapy. 6 months recommended as opposed to 3 months due to N2 disease, higher risk.  Discussed rationale for adjuvant treatment to decrease risk of recurrence and improve survival. Estimated OS around 75% with treatment.  Received IV Injectafer completed 6/18/21 for iron deficiency anemia.    Patient started cycle 1 FOLFOX on 6/16/21.   Cycle 2 delayed by 1 due to neutropenia, given on 7/1/21 with Neulasta  and IV fluids added on day 3. She tolerated well aside from fatigue and nausea.  Cycle 3 delayed by 1 week for thrombocytopenia, 15% dose reduction of oxaliplatin.  Now Cycle 5 has been delayed due to thrombocytopenia. Plan to add 15% dose reduction in 5FU as well.  Labs today show platelets are 125,000. WBC of 3.7 with ANC 1900. Mild anemia with Hgb of 11.8 g/dL. CMP pending.   Ok to proceed with treatment today.  Labs only next week.  Follow-up in 2 weeks with labs prior to Cycle 6.   Plan to repeat CT C/A/P after chemotherapy is complete and continue every 6 months for up to 5 years.  All questions answered at this time.      Kamron Garza, PC

## 2022-04-29 NOTE — PROGRESS NOTES
Patient:   Melissa Jones            MRN: [id number]            FIN: [id number]               Age:   51 years     Sex:  Female     :  1970   Associated Diagnoses:   Malignant neoplasm of sigmoid colon; Iron deficiency anemia; Agranulocytosis secondary to cancer chemotherapy   Author:   Kamron Cordero      Surgeon: Dr. Cruz Farmer    1. Sigmoid Colon Cancer--Stage IIIC ([id number])--Diagnosed 21  Biopsy/pathology:  Colonoscopy done 21--circumferential, ulcerated, sigmoid colon mass at 30cm, biopsy positive for moderately differentiated invasive adenocarcinoma, MMR intact(YURIY), scope did traverse the lesion with some maneuvering, internal hemorrhoids noted, normal mucosa in terminal ileum, otherwise normal examination.  Surgery/pathology:  Laparoscopic sigmoid colectomy done 21--adenocarcinoma, moderately differentiated, measuring 3.5cm with invasion through the muscularis propria into pericolic adipose tissue, margins clear, / regional lymph nodes positive for metastatic adenocarcinoma, focal discontinuous extramural tumor extension c/w vascular invasion (pT3N2b).  Imagin. CT A/P w/ contrast done 21--very mild stranding along a short segment of sigmoid colon may correspond with the primary malignancy, no metastatic disease.   2. CT chest done 21--No acute abnormality. No CT evidence of metastatic disease.    2. Iron Deficiency Anemia--21      3. Idiopathic Thrombocytopenia Purpura - 21  Positive for HLA Class 1A and Anti IIa/IIIa antibodies on 21.     Procedures:   1. EGD done 21--erythema in antrum, c/w gastritis biopsy showing chronic gastritis with focal mild activity, H. pylori negative, normal mucosa in duodenum, normal esophagus and GE junction, moderate amount of bile in stomach.  2. Left subclavian mediport placed 6/15/21.    CEA:  21--3.01  6/10/21--<1.73    Treatment history:  1. Laparoscopic sigmoid colectomy 21.  2. IV  Injectafer 6/11/21 and 6/18/21.     Treatment plan:   1. FOLFOX X 12 cycles started 6/16/21, Neulasta and IV fluids added on day 3 with cycle 2  Cycle 3 delayed by 1 week due to thrombocytopenia, dose reduction of Oxaliplatin by 15%. Cycle 5 delayed due to thrombocytopenia -15% dose reduction 5FU.   Cycle 6 delayed due to thrombocytopenia - additional 10% reduction in 5FU and Oxaliplatin (total 25% dose reduction).  Due for Cycle # 8 today. IVF on day 3.     2. Nplate weekly. Started on 10/6/21.       Visit Information   Visit type:  Scheduled follow-up.    Accompanied by:  No one.       Chief Complaint   10/13/2021 8:44 CDT      Pt reports no new complaints today.        Interval History   Current complaint.   Patient presents for follow-up of colon cancer. She is due for cycle 8 of FOLFOX today. She denies any problems with treatment other than fatigue. Nausea is improved. No neuropathy. She was able to start Nplate weekly last week. Platelets today are 104,000! There has been changes to her medical insurance and we are still waiting on approval for her treatment. No other problems reported today.       Review of Systems   Constitutional:  Fatigue, No fever, No chills, No weakness.    Eye:  No recent visual problem.    Ear/Nose/Mouth/Throat:  No decreased hearing, No nasal congestion, No sore throat.    Respiratory:  No shortness of breath, No cough, No wheezing.    Cardiovascular:  No chest pain, No palpitations, No peripheral edema.    Gastrointestinal:  Constipation, No nausea, No vomiting, No diarrhea, No heartburn, No abdominal pain.    Hematology/Lymphatics:  No bruising tendency, No bleeding tendency.    Immunologic:  Chemotherapy.    Musculoskeletal:  No back pain, No joint pain.    Integumentary:  No rash, No skin lesion.    Neurologic:  Alert and oriented X4, No confusion, No headache.    Psychiatric:  No anxiety, No depression.    All other systems are negative      Health Status   Allergies:     Allergic Reactions (Selected)  No Known Allergies  No Known Medication Allergies   Current medications:  (Selected)   Prescriptions  Prescribed  Compazine 5 mg tab: 5 mg = 1 tab(s), Oral, TID, PRN PRN as needed for nausea/vomiting, # 30 tab(s), 1 Refill(s), [facility], [facility], cm, Height/Length Dosing, 21 13:46:00 CDT, 70.3, kg, Weight Dosing, 21 13:46:00 CDT  Singulair 10 mg oral TABLET: 10 mg = 1 tab(s), Oral, Daily, # 30 tab(s), 3 Refill(s), Pharmacy: [facility], 157, cm, Height/Length Dosing, 21 9:52:00 CDT, 69.3, kg, Weight Dosing, 21 9:52:00 CDT  Documented Medications  Documented  Advil 200 mg oral tablet: 400 mg = 2 tab(s), Oral, q4hr, PRN PRN as needed for pain, # 120 tab(s), 0 Refill(s)  Claritin oral tablet: Daily, 0 Refill(s)  Prilosec 20 mg oral DR capsule: 20 mg = 1 cap(s), Oral, BID, PRN PRN heartburn, 0 Refill(s)  Zofran 8 mg oral tablet: See Instructions, PRN PRN as needed for nausea/vomiting, 1 tab(s) Oral BID for 3 days after chemotherapy and every 8 hours, 4 Refill(s)  traMADol 50 mg oral tablet: 50 mg = 1 tab(s), Oral, q6hr      Histories   Past Medical History:    Resolved  Right ankle sprain ([id number]):  Resolved.  None ([id number]):  Resolved.  Cervical spondylosis ([id number]):  Resolved.   Family History:    Entire family history is negative.   Procedure history:    Catheter Insertion Mediport (.) on 6/15/2021 at 50 Years.  Comments:  6/15/2021 8:18 Christy Garcia  auto-populated from documented surgical case  Colon Resection Descending Laparoscopic (Left) on 2021 at 50 Years.  Comments:  2021 16:31 Christy Garcia  auto-populated from documented surgical case   section ([id number]).  Colonoscopy ([id number]).  Extraction of wisdom tooth ([id number]).   Social History     Alcohol  Use: Never    Substance Use  Use: Never    Tobacco  Use: Never     Spiritual/Cultural  Syn[employer] Preference  [employer].        Physical Examination   Vital Signs   10/13/2021 8:46 CDT      Temperature Oral          36.6 DegC                             Temperature Oral (calculated)             97.88 DegF                             Peripheral Pulse Rate     82 bpm                             Respiratory Rate          14 br/min                             SpO2                      98 %                             Oxygen Therapy            Room air                             Systolic Blood Pressure   127 mmHg                             Diastolic Blood Pressure  86 mmHg                             Blood Pressure Location   Right arm                             Manual Cuff BP            No                             O2 SAT at rest            98 %     General:  Alert and oriented, No acute distress, pleasant white female.    Eye:  Normal conjunctiva, Vision unchanged.    HENT:  Normocephalic, Normal hearing, Oral mucosa is moist.    Neck:  Supple, Non-tender, No jugular venous distention, No lymphadenopathy.    Respiratory:  Lungs are clear to auscultation, Respirations are non-labored, Breath sounds are equal, left chest wall mediport in place, healed well.    Cardiovascular:  Normal rate, Regular rhythm, No murmur, No gallop, Normal peripheral perfusion, No edema.    Gastrointestinal:  Soft, Non-tender, Non-distended, Normal bowel sounds, No organomegaly, scattered laparoscopic incisions healed well, small midline lower abdominal incision healed.    Lymphatics:  No lymphadenopathy neck, axilla, groin.    Musculoskeletal:  Normal range of motion, Normal strength, No deformity, Normal gait.    Integumentary:  Warm, Intact, No rash.    Neurologic:  Alert, Oriented, Normal sensory, Normal motor function.    Psychiatric:  Cooperative, Appropriate mood & affect.    Cognition and Speech:  Oriented, Speech clear and coherent, Functional cognition intact.    ECOG Performance Scale: 1- Strenuous physical activity restricted; fully  ambulatory and able to carry out light work.      Review / Management   Results review:  Lab results   10/13/2021 8:20 CDT      WBC                       7.6 x10(3)/mcL                             RBC                       3.90 x10(6)/mcL  LOW                             Hgb                       12.8 gm/dL                             Hct                       39.2 %                             Platelet                  104 x10(3)/mcL  LOW                             MCV                       100.5 fL  HI                             MCH                       32.8 pg  HI                             MCHC                      32.7 gm/dL  LOW                             RDW                       14.1 %                             MPV                       8.9 fL  LOW                             Abs Neut                  4.55 x10(3)/mcL                             NEUT%                     59.8 %  NA                             NEUT#                     4.6 x10(3)/mcL                             LYMPH%                    20.8 %  NA                             LYMPH#                    1.6 x10(3)/mcL                             MONO%                     8.4 %  NA                             MONO#                     0.6 x10(3)/mcL                             EOS%                      1.3 %  NA                             EOS#                      0.1 x10(3)/mcL                             BASO%                     0.1 %  NA                             BASO#                     0.0 x10(3)/mcL  .       Impression and Plan   Diagnosis     Malignant neoplasm of sigmoid colon (GLJ13-FR C18.7).     Iron deficiency anemia (EGP00-PD D50).     Agranulocytosis secondary to cancer chemotherapy (TYC47-BM D70.1).     Plan   Patient with Stage IIIC Sigmoid colon cancer s/p surgery done 5/18/21. Tumor measured 3.5cm, T3 lesion, Grade 2 with vascular invasion, 7/16 LNs positive, YURIY.  Initial CT C/A/P w/o evidence for any distant  metastatic disease.  Per NCCN guidelines, adjuvant treatment recommended with 6 months of FOLFOX chemotherapy. 6 months recommended as opposed to 3 months due to N2 disease, higher risk.  Discussed rationale for adjuvant treatment to decrease risk of recurrence and improve survival. Estimated OS around 75% with treatment.  Received IV Injectafer completed 6/18/21 for iron deficiency anemia.    Patient started cycle 1 FOLFOX on 6/16/21.   Cycle 2 delayed by 1 due to neutropenia, given on 7/1/21 with Neulasta and IV fluids added on day 3. She tolerated well aside from fatigue and nausea.  Cycle 3 delayed by 1 week for thrombocytopenia, 15% dose reduction of oxaliplatin.  Cycle 5 has delayed due to thrombocytopenia--15% dose reduction in 5FU.  Cycle 6 delayed. Further dose reduction in Oxaliplatin and 5FU by 10% (total 25%).    Additional workup did reveal positive platelet antibodies. Started Nplate weekly on 10/6/21. Will hold today while waiting on insurance approval.   Patient presents for Cycle # 8 today. Ok to proceed.  Labs show platelets are 104,000 today. CBC otherwise good. CMP pending.   She will need to continue with weekly labs.   RTC in 2 weeks T/D visit with labs and will be due for cycle 9.    Plan to repeat CT C/A/P after chemotherapy is complete and continue every 6 months for up to 5 years.  All questions answered at this time.      TIFFANIE Camejo

## 2022-04-29 NOTE — PROGRESS NOTES
Patient:   Melissa Jones            MRN: [id number]            FIN: [id number]               Age:   51 years     Sex:  Female     :  1970   Associated Diagnoses:   Malignant neoplasm of sigmoid colon; Iron deficiency anemia; Agranulocytosis secondary to cancer chemotherapy   Author:   Kamron Cordero      Surgeon: Dr. Cruz Farmer    1. Sigmoid Colon Cancer--Stage IIIC ([id number])--Diagnosed 21  Biopsy/pathology:  Colonoscopy done 21--circumferential, ulcerated, sigmoid colon mass at 30cm, biopsy positive for moderately differentiated invasive adenocarcinoma, MMR intact(YURIY), scope did traverse the lesion with some maneuvering, internal hemorrhoids noted, normal mucosa in terminal ileum, otherwise normal examination.  Surgery/pathology:  Laparoscopic sigmoid colectomy done 21--adenocarcinoma, moderately differentiated, measuring 3.5cm with invasion through the muscularis propria into pericolic adipose tissue, margins clear, / regional lymph nodes positive for metastatic adenocarcinoma, focal discontinuous extramural tumor extension c/w vascular invasion (pT3N2b).  Imagin. CT A/P w/ contrast done 21--very mild stranding along a short segment of sigmoid colon may correspond with the primary malignancy, no metastatic disease.   2. CT chest done 21--No acute abnormality. No CT evidence of metastatic disease.    2. Iron Deficiency Anemia--21      3. Idiopathic Thrombocytopenia Purpura - 21  Positive for HLA Class 1A and Anti IIa/IIIa antibodies on 21.     Procedures:   1. EGD done 21--erythema in antrum, c/w gastritis biopsy showing chronic gastritis with focal mild activity, H. pylori negative, normal mucosa in duodenum, normal esophagus and GE junction, moderate amount of bile in stomach.  2. Left subclavian mediport placed 6/15/21.    CEA:  21--3.01  6/10/21--<1.73    Treatment history:  1. Laparoscopic sigmoid colectomy 21.  2. IV  Injectafer 6/11/21 and 6/18/21.     Treatment plan:   1. FOLFOX X 12 cycles started 6/16/21, Neulasta and IV fluids added on day 3 with cycle 2  Cycle 3 delayed by 1 week due to thrombocytopenia, dose reduction of Oxaliplatin by 15%. Cycle 5 delayed due to thrombocytopenia -15% dose reduction 5FU.   Cycle 6 delayed due to thrombocytopenia - additional 10% reduction in 5FU and Oxaliplatin (total 25% dose reduction).  Due for Cycle # 11 on 11/24/21. IVF on day 3.     2. Nplate weekly. Started on 10/6/21. Plan to stop after Cycle 12.       Visit Information   Visit type:  Scheduled follow-up.    Accompanied by:  No one.       Chief Complaint   11/24/2021 8:59 CST      Pt reports no new concerns today.        Interval History   Current complaint.   Patient presents for follow-up of colon cancer. She is due for cycle 11 of FOLFOX today. She is feeling good today. Her weight is stable. Only having mild neuropathy symptoms at this time and mostly when she touches something cold. Continues on Nplate weekly since 10/6/21. Platelets today are 102,000. Mentions constipation for about 1 week after treatment. Also notices blood tinged mucous when she blows her nose. No significant nose bleeding. No other problems reported today.       Review of Systems   Constitutional:  Fatigue, No fever, No chills, No weakness.    Eye:  No recent visual problem.    Ear/Nose/Mouth/Throat:  blood tinged nasal congestion when she blows her nose, No decreased hearing, No nasal congestion, No sore throat.    Respiratory:  No shortness of breath, No cough, No wheezing.    Cardiovascular:  No chest pain, No palpitations, No peripheral edema.    Gastrointestinal:  Nausea, Constipation, No diarrhea, No heartburn, No abdominal pain.    Hematology/Lymphatics:  No bruising tendency, No bleeding tendency.    Immunologic:  Chemotherapy.    Musculoskeletal:  No back pain, No joint pain.    Integumentary:  No rash, No skin lesion.    Neurologic:  Alert and  oriented X4, No confusion, No headache.    Psychiatric:  No anxiety, No depression.    All other systems are negative      Health Status   Allergies:    Allergic Reactions (Selected)  No Known Allergies  No Known Medication Allergies   Current medications:  (Selected)   Prescriptions  Prescribed  Compazine 5 mg tab: 5 mg = 1 tab(s), Oral, TID, PRN PRN as needed for nausea/vomiting, # 30 tab(s), 1 Refill(s), Pharmacy: [facility], 157, cm, Height/Length Dosing, 21 8:28:00 CST, 69.8, kg, Weight Dosing, 21 8:28:00 CST  Singulair 10 mg oral TABLET: 10 mg = 1 tab(s), Oral, Daily, # 30 tab(s), 3 Refill(s), Pharmacy: [facility], 157, cm, Height/Length Dosing, 21 8:28:00 CST, 69.8, kg, Weight Dosing, 21 8:28:00 CST  Documented Medications  Documented  Advil 200 mg oral tablet: 400 mg = 2 tab(s), Oral, q4hr, PRN PRN as needed for pain, # 120 tab(s), 0 Refill(s)  Claritin oral tablet: Daily, 0 Refill(s)  Prilosec 20 mg oral DR capsule: 20 mg = 1 cap(s), Oral, BID, PRN PRN heartburn, 0 Refill(s)  Zofran 8 mg oral tablet: See Instructions, PRN PRN as needed for nausea/vomiting, 1 tab(s) Oral BID for 3 days after chemotherapy and every 8 hours, 4 Refill(s)  azithromycin 250 mg oral tablet: Oral  traMADol 50 mg oral tablet: 50 mg = 1 tab(s), Oral, q6hr      Histories   Past Medical History:    Resolved  Right ankle sprain ([id number]):  Resolved.  None ([id number]):  Resolved.  Cervical spondylosis ([id number]):  Resolved.   Family History:    Entire family history is negative.   Procedure history:    Catheter Insertion Mediport (.) on 6/15/2021 at 50 Years.  Comments:  6/15/2021 8:18 GABY - Christy Madera  auto-populated from documented surgical case  Colon Resection Descending Laparoscopic (Left) on 2021 at 50 Years.  Comments:  2021 16:31 GABY - Christy Madera  auto-populated from documented surgical case   section ([id number]).  Colonoscopy  ([id number]).  Extraction of wisdom tooth ([id number]).   Social History     Alcohol  Use: Never    Substance Use  Use: Never    Tobacco  Use: Never     Spiritual/Cultural  Fa[employer] Preference [employer].        Physical Examination   Vital Signs   11/24/2021 9:01 CST      Temperature Oral          36.5 DegC                             Temperature Oral (calculated)             97.70 DegF                             Peripheral Pulse Rate     68 bpm                             Respiratory Rate          14 br/min                             SpO2                      98 %                             Oxygen Therapy            Room air                             Systolic Blood Pressure   125 mmHg                             Diastolic Blood Pressure  88 mmHg                             Blood Pressure Location   Left arm                             Manual Cuff BP            No                             O2 SAT at rest            98 %     General:  Alert and oriented, No acute distress, pleasant white female.    Eye:  Normal conjunctiva, Vision unchanged.    HENT:  Normocephalic, Normal hearing, Oral mucosa is moist.    Neck:  Supple, Non-tender, No jugular venous distention, No lymphadenopathy.    Respiratory:  Lungs are clear to auscultation, Respirations are non-labored, Breath sounds are equal, left chest wall mediport in place, healed well.    Cardiovascular:  Normal rate, Regular rhythm, No murmur, No gallop, Normal peripheral perfusion, No edema.    Gastrointestinal:  Soft, Non-tender, Non-distended, Normal bowel sounds, No organomegaly, scattered laparoscopic incisions healed well, small midline lower abdominal incision healed.    Lymphatics:  No lymphadenopathy neck, axilla, groin.    Musculoskeletal:  Normal range of motion, Normal strength, No deformity, Normal gait.    Integumentary:  Warm, Intact, No rash.    Neurologic:  Alert, Oriented, Normal sensory, Normal motor function.    Psychiatric:  Cooperative,  Appropriate mood & affect.    Cognition and Speech:  Oriented, Speech clear and coherent, Functional cognition intact.    ECOG Performance Scale: 1- Strenuous physical activity restricted; fully ambulatory and able to carry out light work.      Review / Management   Results review:  Lab results   11/24/2021 9:04 CST      Est Creat Clearance Ser   64.50 mL/min    11/24/2021 8:37 CST      POC TCO2                  24.0 mmol/L                             POC Hb                    12.2 mg/dL                             POC Hct                   36.0 %  LOW                             POC Sodium                142 mmol/L                             POC Potassium             3.7 mmol/L                             POC Chloride              107 mmol/L                             POC Ion Calcium           1.24 mmol/L                             POC Glucose               152 mg/dL  HI                             POC BUN                   8.0 mg/dL                             POC Creatinine            0.8 mg/dL                             POC AGAP                  17.0  NA    11/24/2021 8:31 CST      WBC                       7.3 x10(3)/mcL                             RBC                       3.67 x10(6)/mcL  LOW                             Hgb                       11.9 gm/dL  LOW                             Hct                       36.5 %  LOW                             Platelet                  102 x10(3)/mcL  LOW                             MCV                       99.5 fL  HI                             MCH                       32.4 pg  HI                             MCHC                      32.6 gm/dL  LOW                             RDW                       13.7 %                             MPV                       9.0 fL  LOW                             Abs Neut                  4.36 x10(3)/mcL                             NEUT%                     59.9 %  NA                             NEUT#                      4.4 x10(3)/mcL                             LYMPH%                    21.5 %  NA                             LYMPH#                    1.6 x10(3)/mcL                             MONO%                     7.4 %  NA                             MONO#                     0.5 x10(3)/mcL                             EOS%                      0.6 %  NA                             EOS#                      0.0 x10(3)/mcL                             BASO%                     0.1 %  NA                             BASO#                     0.0 x10(3)/mcL  .       Impression and Plan   Diagnosis     Malignant neoplasm of sigmoid colon (SBK69-LM C18.7).     Iron deficiency anemia (NRG20-KI D50).     Agranulocytosis secondary to cancer chemotherapy (PNB66-NX D70.1).     Plan   Patient with Stage IIIC Sigmoid colon cancer s/p surgery done 5/18/21. Tumor measured 3.5cm, T3 lesion, Grade 2 with vascular invasion, 7/16 LNs positive, YURIY.  Initial CT C/A/P w/o evidence for any distant metastatic disease.  Per NCCN guidelines, adjuvant treatment recommended with 6 months of FOLFOX chemotherapy. 6 months recommended as opposed to 3 months due to N2 disease, higher risk.  Discussed rationale for adjuvant treatment to decrease risk of recurrence and improve survival. Estimated OS around 75% with treatment.  Received IV Injectafer completed 6/18/21 for iron deficiency anemia.    Patient started cycle 1 FOLFOX on 6/16/21.   Cycle 2 delayed by 1 due to neutropenia, given on 7/1/21 with Neulasta and IV fluids added on day 3. She tolerated well aside from fatigue and nausea.  Cycle 3 delayed by 1 week for thrombocytopenia, 15% dose reduction of oxaliplatin.  Cycle 5 has delayed due to thrombocytopenia--15% dose reduction in 5FU.  Cycle 6 delayed. Further dose reduction in Oxaliplatin and 5FU by 10% (total 25%).    Additional workup did reveal positive platelet antibodies. Started Nplate weekly on 10/6/21.   Patient presents for treatment visit.  Cycle 11 today.   Labs show platelets are 102,000 today. BMP good.   She will need to continue with weekly labs.   RTC in 2 weeks T/D visit with labs and will be due for final cycle 12!   Will stop the Nplate after her final treatment cycle.   Plan to repeat CT C/A/P after chemotherapy is complete and continue every 6 months for up to 5 years. Will schedule her CT scans at her next appointment.   All questions answered at this time.      TIFFANIE Camejo

## 2022-06-10 DIAGNOSIS — C18.7 MALIGNANT NEOPLASM OF SIGMOID COLON: Primary | ICD-10-CM

## 2022-06-15 PROBLEM — C18.7 MALIGNANT NEOPLASM OF SIGMOID COLON: Status: ACTIVE | Noted: 2022-06-15

## 2022-06-15 NOTE — PROGRESS NOTES
Subjective:       Patient ID: Melissa Jones is a 51 y.o. female.    Surgeon: Dr. Cruz Farmer  GI: Dr. Enrique Comer     1. Sigmoid Colon Cancer--Stage IIIC ([id number])--Diagnosed 21  Biopsy/pathology:  Colonoscopy done 21--circumferential, ulcerated, sigmoid colon mass at 30cm, biopsy positive for moderately differentiated invasive adenocarcinoma, MMR intact(YURIY), scope did traverse the lesion with some maneuvering, internal hemorrhoids noted, normal mucosa in terminal ileum, otherwise normal examination.  Surgery/pathology:  Laparoscopic sigmoid colectomy done 21--adenocarcinoma, moderately differentiated, measuring 3.5cm with invasion through the muscularis propria into pericolic adipose tissue, margins clear, / regional lymph nodes positive for metastatic adenocarcinoma, focal discontinuous extramural tumor extension c/w vascular invasion (pT3N2b).  Imagin. CT A/P w/ contrast done 21--very mild stranding along a short segment of sigmoid colon may correspond with the primary malignancy, no metastatic disease.   2. CT chest done 21--No acute abnormality. No CT evidence of metastatic disease.  3. CT C/A/P done 21--YOANNA.   4. CT C/A/P done 22--YOANNA.      2. Iron Deficiency Anemia--21     3. Idiopathic Thrombocytopenia Purpura - 21  Positive for HLA Class 1A and Anti IIa/IIIa antibodies on 21.      Procedures:   1. EGD done 21--erythema in antrum, c/w gastritis biopsy showing chronic gastritis with focal mild activity, H. pylori negative, normal mucosa in duodenum, normal esophagus and GE junction, moderate amount of bile in stomach.  2. Left subclavian mediport placed 6/15/21.  3. Colonoscopy 22--Dr. Enrique Comer showed colonic anastomosis at 15cm, healthy, internal hemorrhoids, normal mucosa in terminal ileum, repeat recommended in 3 years.       CEA:  5/18/21--3.01  6/10/21--<1.73  12/30/21--2.52  03/21/22--<1.73  06/17/22--<1.73     Treatment history:  1. Laparoscopic sigmoid colectomy 5/18/21.  2. IV Injectafer 6/11/21 and 6/18/21.   3. FOLFOX X 12 cycles 6/16/21--12/10/21 (multiple dose reductions and delays for thrombocytopenia).  4. Nplate treatment for ITP 10/6/21--12/1/21.     Treatment plan:   Observation     Chief Complaint: Other Misc (Pt reports no new concerns today.)    HPI   Patient presents for follow-up of colon cancer accompanied by her aunt. She is doing well. Reports feeling well, bowels moving. Recent CT shows YOANNA and I went over results. Labs all good aside from mildly low WBC that has been up and down. She also had her colonoscopy in May that was good.     Past Medical History:   Diagnosis Date    Cervical spondylosis     Colon cancer     GERD (gastroesophageal reflux disease)     MELIDA (iron deficiency anemia)       Review of patient's allergies indicates:  No Known Allergies   Current Outpatient Medications on File Prior to Visit   Medication Sig Dispense Refill    montelukast (SINGULAIR) 10 mg tablet Take 10 mg by mouth.      nitrofurantoin, macrocrystal-monohydrate, (MACROBID) 100 MG capsule Take 100 mg by mouth 2 (two) times daily.       No current facility-administered medications on file prior to visit.      Review of Systems   Constitutional: Negative for appetite change, fatigue, fever and unexpected weight change.   HENT: Negative for mouth sores.    Eyes: Negative.    Respiratory: Negative for cough and shortness of breath.    Cardiovascular: Negative for chest pain and leg swelling.   Gastrointestinal: Negative for abdominal distention, abdominal pain, constipation, diarrhea, nausea, vomiting and reflux.   Genitourinary: Negative for difficulty urinating, dysuria and hematuria.   Musculoskeletal: Negative for arthralgias and back pain.   Integumentary:  Negative for rash.   Neurological: Negative for weakness and  headaches.   Hematological: Negative for adenopathy.   Psychiatric/Behavioral: Negative for sleep disturbance. The patient is not nervous/anxious.               Physical Exam  Constitutional:       Appearance: Normal appearance.   HENT:      Head: Normocephalic.      Nose: Nose normal.      Mouth/Throat:      Mouth: Mucous membranes are moist.   Eyes:      Extraocular Movements: Extraocular movements intact.      Conjunctiva/sclera: Conjunctivae normal.   Cardiovascular:      Rate and Rhythm: Normal rate and regular rhythm.   Pulmonary:      Effort: Pulmonary effort is normal.      Breath sounds: Normal breath sounds.   Abdominal:      General: Bowel sounds are normal. There is no distension.      Palpations: Abdomen is soft.      Tenderness: There is no abdominal tenderness.      Comments: Scattered laparoscopic incisions healed   Musculoskeletal:         General: Normal range of motion.   Skin:     General: Skin is warm.   Neurological:      General: No focal deficit present.      Mental Status: She is alert and oriented to person, place, and time.   Psychiatric:         Mood and Affect: Mood normal.         Judgment: Judgment normal.         Hospital Outpatient Visit on 06/17/2022   Component Date Value    POC Creatinine 06/17/2022 0.9     Sample 06/17/2022 unknown    Lab Visit on 06/17/2022   Component Date Value    Carcinoembryonic Antigen 06/17/2022 <1.73     Sodium Level 06/17/2022 134 (A)    Potassium Level 06/17/2022 4.4     Chloride 06/17/2022 102     Carbon Dioxide 06/17/2022 26     Glucose Level 06/17/2022 97     Blood Urea Nitrogen 06/17/2022 16.2     Creatinine 06/17/2022 0.90     Calcium Level Total 06/17/2022 9.2     Protein Total 06/17/2022 6.9     Albumin Level 06/17/2022 3.8     Globulin 06/17/2022 3.1     Albumin/Globulin Ratio 06/17/2022 1.2     Bilirubin Total 06/17/2022 0.6     Alkaline Phosphatase 06/17/2022 73     Alanine Aminotransferase 06/17/2022 35     Aspartate  Aminotransfera* 06/17/2022 32     Estimated GFR-Non Audelia* 06/17/2022 >60     WBC 06/17/2022 3.4 (A)    RBC 06/17/2022 4.13 (A)    Hgb 06/17/2022 12.5     Hct 06/17/2022 38.0     MCV 06/17/2022 92.0     MCH 06/17/2022 30.3     MCHC 06/17/2022 32.9 (A)    RDW 06/17/2022 12.1     Platelet 06/17/2022 144     MPV 06/17/2022 8.7 (A)    Neut % 06/17/2022 47.8     Lymph % 06/17/2022 41.3     Mono % 06/17/2022 8.8     Eos % 06/17/2022 1.2     Basophil % 06/17/2022 0.3     Lymph # 06/17/2022 1.41     Neut # 06/17/2022 1.6 (A)    Mono # 06/17/2022 0.30     Eos # 06/17/2022 0.04     Baso # 06/17/2022 0.01     IG# 06/17/2022 0.02 (A)    IG% 06/17/2022 0.6 (A)            Assessment:       1. Malignant neoplasm of sigmoid colon         Plan:       Patient with Stage IIIC Sigmoid colon cancer s/p surgery done 5/18/21. Tumor measured 3.5cm, T3 lesion, Grade 2 with vascular invasion, 7/16 LNs positive, YURIY.  Initial CT C/A/P w/o evidence for any distant metastatic disease.  Per NCCN guidelines, adjuvant treatment recommended with 6 months of FOLFOX chemotherapy. 6 months recommended as opposed to 3 months due to N2 disease, higher risk.  Discussed rationale for adjuvant treatment to decrease risk of recurrence and improve survival. Estimated OS around 75% with treatment.  Received IV Injectafer completed 6/18/21 for iron deficiency anemia.     Patient started cycle 1 FOLFOX on 6/16/21. Multiple dose reductions and delays due to thrombocytopenia.  Started on Nplate weekly 10/6/21 for ITP which helped with the continuation of her chemotherapy.  Completed cycle 12 FOLFOX on 12/10/21.  Colonoscopy from 5/9/22 all good. Repeat needed in 3 years.   CT C/A/P from 06/17/22 with YOANNA.     Labs recent show mild neutropenia, CBC otherwise normal. CMP normal and CEA WNL.    Patient is doing very will without any signs or symptoms to suggest disease recurrence.    RTC 3 months for follow-up with repeat labs and  visit.  Plan to repeat CT in 6 months. Continue visits every 3 months until >2 years out and continue CT scans every 6 months X 5 years per NCCN.      All questions answered at this time.   Continue mediport flushes.         Belén Underwood MD

## 2022-06-17 ENCOUNTER — HOSPITAL ENCOUNTER (OUTPATIENT)
Dept: RADIOLOGY | Facility: HOSPITAL | Age: 52
Discharge: HOME | End: 2022-06-17
Attending: INTERNAL MEDICINE
Payer: COMMERCIAL

## 2022-06-17 DIAGNOSIS — C18.7 MALIGNANT NEOPLASM OF SIGMOID COLON: ICD-10-CM

## 2022-06-17 LAB
CREAT SERPL-MCNC: 0.9 MG/DL (ref 0.5–1.4)
SAMPLE: NORMAL

## 2022-06-17 PROCEDURE — 74177 CT ABD & PELVIS W/CONTRAST: CPT | Mod: TC

## 2022-06-17 PROCEDURE — 25500020 PHARM REV CODE 255: Performed by: INTERNAL MEDICINE

## 2022-06-17 PROCEDURE — 71260 CT THORAX DX C+: CPT | Mod: TC

## 2022-06-17 RX ADMIN — IOPAMIDOL 100 ML: 755 INJECTION, SOLUTION INTRAVENOUS at 10:06

## 2022-06-17 RX ADMIN — DIATRIZOATE MEGLUMINE AND DIATRIZOATE SODIUM 30 ML: 660; 100 LIQUID ORAL; RECTAL at 10:06

## 2022-06-21 ENCOUNTER — OFFICE VISIT (OUTPATIENT)
Dept: HEMATOLOGY/ONCOLOGY | Facility: CLINIC | Age: 52
End: 2022-06-21
Payer: COMMERCIAL

## 2022-06-21 VITALS
HEART RATE: 95 BPM | RESPIRATION RATE: 14 BRPM | HEIGHT: 62 IN | TEMPERATURE: 98 F | SYSTOLIC BLOOD PRESSURE: 102 MMHG | BODY MASS INDEX: 28.76 KG/M2 | WEIGHT: 156.31 LBS | OXYGEN SATURATION: 98 % | DIASTOLIC BLOOD PRESSURE: 68 MMHG

## 2022-06-21 DIAGNOSIS — C18.7 MALIGNANT NEOPLASM OF SIGMOID COLON: ICD-10-CM

## 2022-06-21 PROCEDURE — 1160F PR REVIEW ALL MEDS BY PRESCRIBER/CLIN PHARMACIST DOCUMENTED: ICD-10-PCS | Mod: CPTII,S$GLB,, | Performed by: INTERNAL MEDICINE

## 2022-06-21 PROCEDURE — 1159F PR MEDICATION LIST DOCUMENTED IN MEDICAL RECORD: ICD-10-PCS | Mod: CPTII,S$GLB,, | Performed by: INTERNAL MEDICINE

## 2022-06-21 PROCEDURE — 3008F PR BODY MASS INDEX (BMI) DOCUMENTED: ICD-10-PCS | Mod: CPTII,S$GLB,, | Performed by: INTERNAL MEDICINE

## 2022-06-21 PROCEDURE — 3078F PR MOST RECENT DIASTOLIC BLOOD PRESSURE < 80 MM HG: ICD-10-PCS | Mod: CPTII,S$GLB,, | Performed by: INTERNAL MEDICINE

## 2022-06-21 PROCEDURE — 3078F DIAST BP <80 MM HG: CPT | Mod: CPTII,S$GLB,, | Performed by: INTERNAL MEDICINE

## 2022-06-21 PROCEDURE — 1160F RVW MEDS BY RX/DR IN RCRD: CPT | Mod: CPTII,S$GLB,, | Performed by: INTERNAL MEDICINE

## 2022-06-21 PROCEDURE — 3074F SYST BP LT 130 MM HG: CPT | Mod: CPTII,S$GLB,, | Performed by: INTERNAL MEDICINE

## 2022-06-21 PROCEDURE — 99999 PR PBB SHADOW E&M-EST. PATIENT-LVL III: ICD-10-PCS | Mod: PBBFAC,,, | Performed by: INTERNAL MEDICINE

## 2022-06-21 PROCEDURE — 3074F PR MOST RECENT SYSTOLIC BLOOD PRESSURE < 130 MM HG: ICD-10-PCS | Mod: CPTII,S$GLB,, | Performed by: INTERNAL MEDICINE

## 2022-06-21 PROCEDURE — 99999 PR PBB SHADOW E&M-EST. PATIENT-LVL III: CPT | Mod: PBBFAC,,, | Performed by: INTERNAL MEDICINE

## 2022-06-21 PROCEDURE — 99214 OFFICE O/P EST MOD 30 MIN: CPT | Mod: S$GLB,,, | Performed by: INTERNAL MEDICINE

## 2022-06-21 PROCEDURE — 1159F MED LIST DOCD IN RCRD: CPT | Mod: CPTII,S$GLB,, | Performed by: INTERNAL MEDICINE

## 2022-06-21 PROCEDURE — 3008F BODY MASS INDEX DOCD: CPT | Mod: CPTII,S$GLB,, | Performed by: INTERNAL MEDICINE

## 2022-06-21 PROCEDURE — 99214 PR OFFICE/OUTPT VISIT, EST, LEVL IV, 30-39 MIN: ICD-10-PCS | Mod: S$GLB,,, | Performed by: INTERNAL MEDICINE

## 2022-06-21 RX ORDER — HEPARIN 100 UNIT/ML
500 SYRINGE INTRAVENOUS
Status: CANCELLED | OUTPATIENT
Start: 2022-06-27

## 2022-06-21 RX ORDER — MONTELUKAST SODIUM 10 MG/1
10 TABLET ORAL
COMMUNITY
Start: 2021-11-09 | End: 2023-12-15

## 2022-06-21 RX ORDER — NITROFURANTOIN 25; 75 MG/1; MG/1
100 CAPSULE ORAL 2 TIMES DAILY
COMMUNITY
Start: 2022-04-29 | End: 2022-09-26

## 2022-06-21 RX ORDER — SODIUM CHLORIDE 0.9 % (FLUSH) 0.9 %
10 SYRINGE (ML) INJECTION
Status: CANCELLED | OUTPATIENT
Start: 2022-06-27

## 2022-06-27 ENCOUNTER — INFUSION (OUTPATIENT)
Dept: INFUSION THERAPY | Facility: HOSPITAL | Age: 52
End: 2022-06-27
Attending: INTERNAL MEDICINE
Payer: COMMERCIAL

## 2022-06-27 VITALS
RESPIRATION RATE: 16 BRPM | HEART RATE: 90 BPM | DIASTOLIC BLOOD PRESSURE: 74 MMHG | HEIGHT: 62 IN | TEMPERATURE: 98 F | SYSTOLIC BLOOD PRESSURE: 114 MMHG | BODY MASS INDEX: 29.17 KG/M2 | WEIGHT: 158.5 LBS

## 2022-06-27 DIAGNOSIS — C18.7 MALIGNANT NEOPLASM OF SIGMOID COLON: Primary | ICD-10-CM

## 2022-06-27 PROCEDURE — 63600175 PHARM REV CODE 636 W HCPCS: Performed by: INTERNAL MEDICINE

## 2022-06-27 PROCEDURE — 96523 IRRIG DRUG DELIVERY DEVICE: CPT

## 2022-06-27 RX ORDER — SODIUM CHLORIDE 0.9 % (FLUSH) 0.9 %
10 SYRINGE (ML) INJECTION
Status: DISCONTINUED | OUTPATIENT
Start: 2022-06-27 | End: 2022-06-27 | Stop reason: HOSPADM

## 2022-06-27 RX ORDER — HEPARIN 100 UNIT/ML
500 SYRINGE INTRAVENOUS
Status: CANCELLED | OUTPATIENT
Start: 2022-09-19

## 2022-06-27 RX ORDER — SODIUM CHLORIDE 0.9 % (FLUSH) 0.9 %
10 SYRINGE (ML) INJECTION
Status: CANCELLED | OUTPATIENT
Start: 2022-09-19

## 2022-06-27 RX ORDER — HEPARIN 100 UNIT/ML
500 SYRINGE INTRAVENOUS
Status: DISCONTINUED | OUTPATIENT
Start: 2022-06-27 | End: 2022-06-27 | Stop reason: HOSPADM

## 2022-06-27 RX ADMIN — HEPARIN 500 UNITS: 100 SYRINGE at 07:06

## 2022-07-29 ENCOUNTER — PATIENT OUTREACH (OUTPATIENT)
Dept: ADMINISTRATIVE | Facility: HOSPITAL | Age: 52
End: 2022-07-29
Payer: COMMERCIAL

## 2022-07-29 NOTE — PROGRESS NOTES
Population Health Outreach.Records Received, hyper-linked into chart at this time. The following record(s)  below were uploaded for Health Maintenance .          5/5/21 COLONOSCOPY

## 2022-09-19 ENCOUNTER — INFUSION (OUTPATIENT)
Dept: INFUSION THERAPY | Facility: HOSPITAL | Age: 52
End: 2022-09-19
Attending: INTERNAL MEDICINE
Payer: COMMERCIAL

## 2022-09-19 VITALS
HEART RATE: 65 BPM | SYSTOLIC BLOOD PRESSURE: 125 MMHG | DIASTOLIC BLOOD PRESSURE: 84 MMHG | TEMPERATURE: 98 F | OXYGEN SATURATION: 98 % | RESPIRATION RATE: 16 BRPM

## 2022-09-19 DIAGNOSIS — C18.7 MALIGNANT NEOPLASM OF SIGMOID COLON: Primary | ICD-10-CM

## 2022-09-19 PROCEDURE — 63600175 PHARM REV CODE 636 W HCPCS: Performed by: INTERNAL MEDICINE

## 2022-09-19 PROCEDURE — 96523 IRRIG DRUG DELIVERY DEVICE: CPT

## 2022-09-19 RX ORDER — HEPARIN 100 UNIT/ML
500 SYRINGE INTRAVENOUS
Status: DISCONTINUED | OUTPATIENT
Start: 2022-09-19 | End: 2022-09-19 | Stop reason: HOSPADM

## 2022-09-19 RX ORDER — SODIUM CHLORIDE 0.9 % (FLUSH) 0.9 %
10 SYRINGE (ML) INJECTION
Status: DISCONTINUED | OUTPATIENT
Start: 2022-09-19 | End: 2022-09-19 | Stop reason: HOSPADM

## 2022-09-19 RX ORDER — SODIUM CHLORIDE 0.9 % (FLUSH) 0.9 %
10 SYRINGE (ML) INJECTION
Status: CANCELLED | OUTPATIENT
Start: 2022-12-12

## 2022-09-19 RX ORDER — HEPARIN 100 UNIT/ML
500 SYRINGE INTRAVENOUS
Status: CANCELLED | OUTPATIENT
Start: 2022-12-12

## 2022-09-19 RX ADMIN — HEPARIN 500 UNITS: 100 SYRINGE at 08:09

## 2022-09-19 NOTE — PLAN OF CARE
Pt tolerated mpf well. Next appt scheduled and reviewed with patient. Pt denied questions or further needs at the time of discharge.

## 2022-09-20 PROBLEM — G43.909 MIGRAINE HEADACHE: Status: ACTIVE | Noted: 2022-09-20

## 2022-09-20 PROBLEM — E66.9 OBESITY: Status: ACTIVE | Noted: 2022-09-20

## 2022-09-20 NOTE — PROGRESS NOTES
Subjective:       Patient ID: Melissa Jones is a 52 y.o. female.    Surgeon: Dr. Cruz Farmer  GI: Dr. Enrique Comer     1. Sigmoid Colon Cancer--Stage IIIC ([id number])--Diagnosed 21  Biopsy/pathology:  Colonoscopy done 21--circumferential, ulcerated, sigmoid colon mass at 30cm, biopsy positive for moderately differentiated invasive adenocarcinoma, MMR intact(YURIY), scope did traverse the lesion with some maneuvering, internal hemorrhoids noted, normal mucosa in terminal ileum, otherwise normal examination.  Surgery/pathology:  Laparoscopic sigmoid colectomy done 21--adenocarcinoma, moderately differentiated, measuring 3.5cm with invasion through the muscularis propria into pericolic adipose tissue, margins clear, / regional lymph nodes positive for metastatic adenocarcinoma, focal discontinuous extramural tumor extension c/w vascular invasion (pT3N2b).  Imagin. CT A/P w/ contrast done 21--very mild stranding along a short segment of sigmoid colon may correspond with the primary malignancy, no metastatic disease.   2. CT chest done 21--No acute abnormality. No CT evidence of metastatic disease.  3. CT C/A/P done 21--YOANNA.   4. CT C/A/P done 22--YOANNA.      2. Iron Deficiency Anemia--21     3. Idiopathic Thrombocytopenia Purpura - 21  Positive for HLA Class 1A and Anti IIa/IIIa antibodies on 21.      Procedures:   1. EGD done 21--erythema in antrum, c/w gastritis biopsy showing chronic gastritis with focal mild activity, H. pylori negative, normal mucosa in duodenum, normal esophagus and GE junction, moderate amount of bile in stomach.  2. Left subclavian mediport placed 6/15/21.  3. Colonoscopy 22--Dr. Enrique Comer showed colonic anastomosis at 15cm, healthy, internal hemorrhoids, normal mucosa in terminal ileum, repeat recommended in 3 years.      CEA:  21--3.01  6/10/21--<1.73  21--2.52  22--<1.73  22--<1.73      Treatment history:  1. Laparoscopic sigmoid colectomy 5/18/21.  2. IV Injectafer 6/11/21 and 6/18/21.   3. FOLFOX X 12 cycles 6/16/21--12/10/21 (multiple dose reductions and delays for thrombocytopenia).  4. Nplate treatment for ITP 10/6/21--12/1/21.     Treatment plan:   Observation     Chief Complaint: Other Misc (Pt reports no new concerns today.)    HPI   Patient presents for follow-up of colon cancer accompanied by her aunt. She is doing well. Reports feeling well, bowels moving. Labs all good aside from mildly low WBC that has been up and down. Denies any recent or recurrent infections. No new problems reported.     Past Medical History:   Diagnosis Date    Cervical spondylosis     Colon cancer     GERD (gastroesophageal reflux disease)     MELIDA (iron deficiency anemia)       Review of patient's allergies indicates:  No Known Allergies   Current Outpatient Medications on File Prior to Visit   Medication Sig Dispense Refill    montelukast (SINGULAIR) 10 mg tablet Take 10 mg by mouth.      valACYclovir (VALTREX) 1000 MG tablet Take 1 g by mouth.      [DISCONTINUED] CLENPIQ 10 mg-3.5 gram -12 gram/160 mL Soln Take by mouth.      [DISCONTINUED] nitrofurantoin, macrocrystal-monohydrate, (MACROBID) 100 MG capsule Take 100 mg by mouth 2 (two) times daily.       No current facility-administered medications on file prior to visit.      Review of Systems   Constitutional:  Negative for appetite change, fatigue, fever and unexpected weight change.   HENT:  Negative for mouth sores.    Eyes: Negative.  Negative for visual disturbance.   Respiratory:  Negative for cough and shortness of breath.    Cardiovascular:  Negative for chest pain and leg swelling.   Gastrointestinal:  Negative for abdominal distention, abdominal pain, constipation, diarrhea, nausea, vomiting and reflux.   Genitourinary:  Negative for difficulty urinating, dysuria, frequency and hematuria.   Musculoskeletal:  Negative for arthralgias and back pain.    Integumentary:  Negative for rash.   Neurological:  Negative for weakness and headaches.   Hematological:  Negative for adenopathy.   Psychiatric/Behavioral:  Negative for sleep disturbance. The patient is not nervous/anxious.          Physical Exam  Constitutional:       Appearance: Normal appearance. She is normal weight.   HENT:      Head: Normocephalic.      Nose: Nose normal.      Mouth/Throat:      Mouth: Mucous membranes are moist.   Eyes:      General: Lids are normal. Vision grossly intact.      Extraocular Movements: Extraocular movements intact.      Conjunctiva/sclera: Conjunctivae normal.   Cardiovascular:      Rate and Rhythm: Normal rate and regular rhythm.      Heart sounds: Normal heart sounds.   Pulmonary:      Effort: Pulmonary effort is normal.      Breath sounds: Normal breath sounds.   Chest:      Comments: Left CW mediport intact  Abdominal:      General: Abdomen is flat. Bowel sounds are normal. There is no distension.      Palpations: Abdomen is soft.      Tenderness: There is no abdominal tenderness.      Comments: Scattered laparoscopic incisions healed   Musculoskeletal:         General: Normal range of motion.      Cervical back: Normal range of motion and neck supple.      Right lower leg: No edema.      Left lower leg: No edema.   Skin:     General: Skin is warm and moist.   Neurological:      General: No focal deficit present.      Mental Status: She is alert and oriented to person, place, and time.   Psychiatric:         Attention and Perception: Attention normal.         Mood and Affect: Mood normal.         Speech: Speech normal.         Behavior: Behavior is cooperative.         Cognition and Memory: Cognition normal.         Judgment: Judgment normal.       Lab Visit on 09/23/2022   Component Date Value    Sodium Level 09/23/2022 139     Potassium Level 09/23/2022 4.6     Chloride 09/23/2022 108 (H)     Carbon Dioxide 09/23/2022 24     Glucose Level 09/23/2022 95     Blood Urea  Nitrogen 09/23/2022 14.9     Creatinine 09/23/2022 0.98     Calcium Level Total 09/23/2022 9.4     Protein Total 09/23/2022 7.2     Albumin Level 09/23/2022 4.2     Globulin 09/23/2022 3.0     Albumin/Globulin Ratio 09/23/2022 1.4     Bilirubin Total 09/23/2022 0.4     Alkaline Phosphatase 09/23/2022 65     Alanine Aminotransferase 09/23/2022 21     Aspartate Aminotransfera* 09/23/2022 25     eGFR 09/23/2022 >60     Carcinoembryonic Antigen 09/23/2022 <1.73     WBC 09/23/2022 3.8 (L)     RBC 09/23/2022 4.20     Hgb 09/23/2022 12.7     Hct 09/23/2022 39.2     MCV 09/23/2022 93.3     MCH 09/23/2022 30.2     MCHC 09/23/2022 32.4 (L)     RDW 09/23/2022 11.7     Platelet 09/23/2022 147     MPV 09/23/2022 9.2     Neut % 09/23/2022 45.1     Lymph % 09/23/2022 44.4     Mono % 09/23/2022 8.4     Eos % 09/23/2022 1.3     Basophil % 09/23/2022 0.5     Lymph # 09/23/2022 1.69     Neut # 09/23/2022 1.7 (L)     Mono # 09/23/2022 0.32     Eos # 09/23/2022 0.05     Baso # 09/23/2022 0.02     IG# 09/23/2022 0.01     IG% 09/23/2022 0.3    Historical on 09/22/2022   Component Date Value    Inflenza A Ag 12/08/2021 Negative     Influenza B Ag 12/08/2021 Negative     SARS-CoV-2 PCR 12/08/2021 Negative    Historical on 09/22/2022   Component Date Value    Glucose, UA 06/28/2021 Negative     Bilirubin, POC 06/28/2021 Negative     Ketones, UA 06/28/2021 Trace     Color, UA 06/28/2021 Yellow     Clarity, UA 06/28/2021 Slightly cloudy     Spec Grav UA 06/28/2021 1.020     Blood, UA 06/28/2021 Small     pH, UA 06/28/2021 6     Protein, POC 06/28/2021 1+ (30 mg/dl)     Urobilinogen, UA 06/28/2021 2 mg/dl     Nitrite, UA 06/28/2021 Negative     Leukocytes, UA 06/28/2021 Large    Historical on 09/22/2022   Component Date Value    Inflenza A Ag 01/31/2021 Negative     Influenza B Ag 01/31/2021 Negative    Historical on 09/22/2022   Component Date Value    Inflenza A Ag 01/11/2022 Negative     Influenza B Ag 01/11/2022 Negative     SARS-CoV-2 PCR  01/11/2022 Positive (A)    Historical on 09/22/2022   Component Date Value    Rapid Group A Strep 11/29/2018 Positive     Inflenza A Ag 11/29/2018 Negative     Influenza B Ag 11/29/2018 Negative    Historical on 09/22/2022   Component Date Value    Rapid Group A Strep 01/13/2020 Negative     Inflenza A Ag 01/13/2020 Negative     Influenza B Ag 01/13/2020 Negative    Historical on 09/21/2022   Component Date Value    Rapid Group A Strep 12/13/2017 Positive     Inflenza A Ag 12/13/2017 Negative     Influenza B Ag 12/13/2017 Negative       ECOG SCORE    0 - Fully active-able to carry on all pre-disease performance without restriction            Assessment:       1. Malignant neoplasm of sigmoid colon         Plan:       Patient with Stage IIIC Sigmoid colon cancer s/p surgery done 5/18/21. Tumor measured 3.5cm, T3 lesion, Grade 2 with vascular invasion, 7/16 LNs positive, YURIY.  Initial CT C/A/P w/o evidence for any distant metastatic disease.  Per NCCN guidelines, adjuvant treatment recommended with 6 months of FOLFOX chemotherapy. 6 months recommended as opposed to 3 months due to N2 disease, higher risk.  Discussed rationale for adjuvant treatment to decrease risk of recurrence and improve survival. Estimated OS around 75% with treatment.  Received IV Injectafer completed 6/18/21 for iron deficiency anemia.     Patient started cycle 1 FOLFOX on 6/16/21. Multiple dose reductions and delays due to thrombocytopenia.  Started on Nplate weekly 10/6/21 for ITP which helped with the continuation of her chemotherapy.  Completed cycle 12 FOLFOX on 12/10/21.  Colonoscopy from 5/9/22 all good. Repeat needed in 3 years.   CT C/A/P from 06/17/22 with YOANNA.     Patient is doing very will without any signs or symptoms to suggest disease recurrence.  Labs recent show mild neutropenia, CBC otherwise normal. CMP normal and CEA WNL.  RTC 3 months for follow-up with repeat labs and visit.  Plan to repeat CT in 3 months.   Continue visits  every 3 months until >2 years out and continue CT scans every 6 months X 5 years per NCCN.   All questions answered at this time.   Continue mediport flushes.         TIFFANIE Camejo

## 2022-09-21 ENCOUNTER — HISTORICAL (OUTPATIENT)
Dept: ADMINISTRATIVE | Facility: HOSPITAL | Age: 52
End: 2022-09-21

## 2022-09-22 ENCOUNTER — HISTORICAL (OUTPATIENT)
Dept: ADMINISTRATIVE | Facility: HOSPITAL | Age: 52
End: 2022-09-22

## 2022-09-23 ENCOUNTER — LAB VISIT (OUTPATIENT)
Dept: LAB | Facility: HOSPITAL | Age: 52
End: 2022-09-23
Attending: INTERNAL MEDICINE
Payer: COMMERCIAL

## 2022-09-23 DIAGNOSIS — C18.7 MALIGNANT NEOPLASM OF SIGMOID COLON: ICD-10-CM

## 2022-09-23 LAB
ALBUMIN SERPL-MCNC: 4.2 GM/DL (ref 3.5–5)
ALBUMIN/GLOB SERPL: 1.4 RATIO (ref 1.1–2)
ALP SERPL-CCNC: 65 UNIT/L (ref 40–150)
ALT SERPL-CCNC: 21 UNIT/L (ref 0–55)
AST SERPL-CCNC: 25 UNIT/L (ref 5–34)
BASOPHILS # BLD AUTO: 0.02 X10(3)/MCL (ref 0–0.2)
BASOPHILS NFR BLD AUTO: 0.5 %
BILIRUBIN DIRECT+TOT PNL SERPL-MCNC: 0.4 MG/DL
BUN SERPL-MCNC: 14.9 MG/DL (ref 9.8–20.1)
CALCIUM SERPL-MCNC: 9.4 MG/DL (ref 8.4–10.2)
CEA SERPL-MCNC: <1.73 NG/ML (ref 0–3)
CHLORIDE SERPL-SCNC: 108 MMOL/L (ref 98–107)
CO2 SERPL-SCNC: 24 MMOL/L (ref 22–29)
CREAT SERPL-MCNC: 0.98 MG/DL (ref 0.55–1.02)
EOSINOPHIL # BLD AUTO: 0.05 X10(3)/MCL (ref 0–0.9)
EOSINOPHIL NFR BLD AUTO: 1.3 %
ERYTHROCYTE [DISTWIDTH] IN BLOOD BY AUTOMATED COUNT: 11.7 % (ref 11.5–17)
GFR SERPLBLD CREATININE-BSD FMLA CKD-EPI: >60 MLS/MIN/1.73/M2
GLOBULIN SER-MCNC: 3 GM/DL (ref 2.4–3.5)
GLUCOSE SERPL-MCNC: 95 MG/DL (ref 74–100)
HCT VFR BLD AUTO: 39.2 % (ref 37–47)
HGB BLD-MCNC: 12.7 GM/DL (ref 12–16)
IMM GRANULOCYTES # BLD AUTO: 0.01 X10(3)/MCL (ref 0–0.04)
IMM GRANULOCYTES NFR BLD AUTO: 0.3 %
LYMPHOCYTES # BLD AUTO: 1.69 X10(3)/MCL (ref 0.6–4.6)
LYMPHOCYTES NFR BLD AUTO: 44.4 %
MCH RBC QN AUTO: 30.2 PG (ref 27–31)
MCHC RBC AUTO-ENTMCNC: 32.4 MG/DL (ref 33–36)
MCV RBC AUTO: 93.3 FL (ref 80–94)
MONOCYTES # BLD AUTO: 0.32 X10(3)/MCL (ref 0.1–1.3)
MONOCYTES NFR BLD AUTO: 8.4 %
NEUTROPHILS # BLD AUTO: 1.7 X10(3)/MCL (ref 2.1–9.2)
NEUTROPHILS NFR BLD AUTO: 45.1 %
PLATELET # BLD AUTO: 147 X10(3)/MCL (ref 130–400)
PMV BLD AUTO: 9.2 FL (ref 7.4–10.4)
POTASSIUM SERPL-SCNC: 4.6 MMOL/L (ref 3.5–5.1)
PROT SERPL-MCNC: 7.2 GM/DL (ref 6.4–8.3)
RBC # BLD AUTO: 4.2 X10(6)/MCL (ref 4.2–5.4)
SODIUM SERPL-SCNC: 139 MMOL/L (ref 136–145)
WBC # SPEC AUTO: 3.8 X10(3)/MCL (ref 4.5–11.5)

## 2022-09-23 PROCEDURE — 80053 COMPREHEN METABOLIC PANEL: CPT

## 2022-09-23 PROCEDURE — 85025 COMPLETE CBC W/AUTO DIFF WBC: CPT

## 2022-09-23 PROCEDURE — 36415 COLL VENOUS BLD VENIPUNCTURE: CPT

## 2022-09-23 PROCEDURE — 82378 CARCINOEMBRYONIC ANTIGEN: CPT

## 2022-09-26 ENCOUNTER — OFFICE VISIT (OUTPATIENT)
Dept: HEMATOLOGY/ONCOLOGY | Facility: CLINIC | Age: 52
End: 2022-09-26
Payer: COMMERCIAL

## 2022-09-26 VITALS
BODY MASS INDEX: 29.72 KG/M2 | OXYGEN SATURATION: 93 % | HEART RATE: 93 BPM | WEIGHT: 161.5 LBS | SYSTOLIC BLOOD PRESSURE: 119 MMHG | HEIGHT: 62 IN | RESPIRATION RATE: 14 BRPM | TEMPERATURE: 98 F | DIASTOLIC BLOOD PRESSURE: 81 MMHG

## 2022-09-26 DIAGNOSIS — C18.7 MALIGNANT NEOPLASM OF SIGMOID COLON: Primary | ICD-10-CM

## 2022-09-26 PROCEDURE — 3008F BODY MASS INDEX DOCD: CPT | Mod: CPTII,S$GLB,, | Performed by: NURSE PRACTITIONER

## 2022-09-26 PROCEDURE — 3074F PR MOST RECENT SYSTOLIC BLOOD PRESSURE < 130 MM HG: ICD-10-PCS | Mod: CPTII,S$GLB,, | Performed by: NURSE PRACTITIONER

## 2022-09-26 PROCEDURE — 99214 OFFICE O/P EST MOD 30 MIN: CPT | Mod: S$GLB,,, | Performed by: NURSE PRACTITIONER

## 2022-09-26 PROCEDURE — 1159F PR MEDICATION LIST DOCUMENTED IN MEDICAL RECORD: ICD-10-PCS | Mod: CPTII,S$GLB,, | Performed by: NURSE PRACTITIONER

## 2022-09-26 PROCEDURE — 3079F DIAST BP 80-89 MM HG: CPT | Mod: CPTII,S$GLB,, | Performed by: NURSE PRACTITIONER

## 2022-09-26 PROCEDURE — 3008F PR BODY MASS INDEX (BMI) DOCUMENTED: ICD-10-PCS | Mod: CPTII,S$GLB,, | Performed by: NURSE PRACTITIONER

## 2022-09-26 PROCEDURE — 1160F PR REVIEW ALL MEDS BY PRESCRIBER/CLIN PHARMACIST DOCUMENTED: ICD-10-PCS | Mod: CPTII,S$GLB,, | Performed by: NURSE PRACTITIONER

## 2022-09-26 PROCEDURE — 99999 PR PBB SHADOW E&M-EST. PATIENT-LVL IV: CPT | Mod: PBBFAC,,, | Performed by: NURSE PRACTITIONER

## 2022-09-26 PROCEDURE — 1159F MED LIST DOCD IN RCRD: CPT | Mod: CPTII,S$GLB,, | Performed by: NURSE PRACTITIONER

## 2022-09-26 PROCEDURE — 1160F RVW MEDS BY RX/DR IN RCRD: CPT | Mod: CPTII,S$GLB,, | Performed by: NURSE PRACTITIONER

## 2022-09-26 PROCEDURE — 3079F PR MOST RECENT DIASTOLIC BLOOD PRESSURE 80-89 MM HG: ICD-10-PCS | Mod: CPTII,S$GLB,, | Performed by: NURSE PRACTITIONER

## 2022-09-26 PROCEDURE — 3074F SYST BP LT 130 MM HG: CPT | Mod: CPTII,S$GLB,, | Performed by: NURSE PRACTITIONER

## 2022-09-26 PROCEDURE — 99999 PR PBB SHADOW E&M-EST. PATIENT-LVL IV: ICD-10-PCS | Mod: PBBFAC,,, | Performed by: NURSE PRACTITIONER

## 2022-09-26 PROCEDURE — 99214 PR OFFICE/OUTPT VISIT, EST, LEVL IV, 30-39 MIN: ICD-10-PCS | Mod: S$GLB,,, | Performed by: NURSE PRACTITIONER

## 2022-09-26 RX ORDER — SODIUM PICOSULFATE, MAGNESIUM OXIDE, AND ANHYDROUS CITRIC ACID 10; 3.5; 12 MG/160ML; G/160ML; G/160ML
LIQUID ORAL
COMMUNITY
Start: 2022-05-06 | End: 2022-09-26

## 2022-09-26 RX ORDER — VALACYCLOVIR HYDROCHLORIDE 1 G/1
1 TABLET, FILM COATED ORAL
COMMUNITY
Start: 2021-12-10 | End: 2023-03-20 | Stop reason: SDUPTHER

## 2022-12-16 ENCOUNTER — HOSPITAL ENCOUNTER (OUTPATIENT)
Dept: RADIOLOGY | Facility: HOSPITAL | Age: 52
Discharge: HOME | End: 2022-12-16
Attending: NURSE PRACTITIONER
Payer: COMMERCIAL

## 2022-12-16 DIAGNOSIS — C18.7 MALIGNANT NEOPLASM OF SIGMOID COLON: ICD-10-CM

## 2022-12-16 PROCEDURE — 25500020 PHARM REV CODE 255: Performed by: NURSE PRACTITIONER

## 2022-12-16 PROCEDURE — 71260 CT THORAX DX C+: CPT | Mod: TC

## 2022-12-16 PROCEDURE — 74177 CT ABD & PELVIS W/CONTRAST: CPT | Mod: TC

## 2022-12-16 RX ADMIN — IOPAMIDOL 100 ML: 755 INJECTION, SOLUTION INTRAVENOUS at 02:12

## 2022-12-19 ENCOUNTER — INFUSION (OUTPATIENT)
Dept: INFUSION THERAPY | Facility: HOSPITAL | Age: 52
End: 2022-12-19
Attending: INTERNAL MEDICINE
Payer: COMMERCIAL

## 2022-12-19 VITALS
BODY MASS INDEX: 29.63 KG/M2 | DIASTOLIC BLOOD PRESSURE: 83 MMHG | HEART RATE: 81 BPM | HEIGHT: 62 IN | WEIGHT: 161 LBS | TEMPERATURE: 98 F | SYSTOLIC BLOOD PRESSURE: 127 MMHG | RESPIRATION RATE: 16 BRPM

## 2022-12-19 DIAGNOSIS — C18.7 MALIGNANT NEOPLASM OF SIGMOID COLON: Primary | ICD-10-CM

## 2022-12-19 PROCEDURE — 96523 IRRIG DRUG DELIVERY DEVICE: CPT

## 2022-12-19 PROCEDURE — 63600175 PHARM REV CODE 636 W HCPCS: Performed by: INTERNAL MEDICINE

## 2022-12-19 PROCEDURE — 25000003 PHARM REV CODE 250: Performed by: INTERNAL MEDICINE

## 2022-12-19 PROCEDURE — A4216 STERILE WATER/SALINE, 10 ML: HCPCS | Performed by: INTERNAL MEDICINE

## 2022-12-19 RX ORDER — SODIUM CHLORIDE 0.9 % (FLUSH) 0.9 %
10 SYRINGE (ML) INJECTION
Status: CANCELLED | OUTPATIENT
Start: 2023-03-06

## 2022-12-19 RX ORDER — HEPARIN 100 UNIT/ML
500 SYRINGE INTRAVENOUS
Status: CANCELLED | OUTPATIENT
Start: 2023-03-06

## 2022-12-19 RX ORDER — HEPARIN 100 UNIT/ML
500 SYRINGE INTRAVENOUS
Status: DISCONTINUED | OUTPATIENT
Start: 2022-12-19 | End: 2022-12-19 | Stop reason: HOSPADM

## 2022-12-19 RX ORDER — SODIUM CHLORIDE 0.9 % (FLUSH) 0.9 %
10 SYRINGE (ML) INJECTION
Status: DISCONTINUED | OUTPATIENT
Start: 2022-12-19 | End: 2022-12-19 | Stop reason: HOSPADM

## 2022-12-19 RX ADMIN — Medication 10 ML: at 08:12

## 2022-12-19 RX ADMIN — HEPARIN 500 UNITS: 100 SYRINGE at 08:12

## 2022-12-19 NOTE — PROGRESS NOTES
Subjective:       Patient ID: Melissa Jones is a 52 y.o. female.    Surgeon: Dr. Cruz Farmer  GI: Dr. Enrique Comer     1. Sigmoid Colon Cancer--Stage IIIC ([id number])--Diagnosed 21  Biopsy/pathology:  Colonoscopy done 21--circumferential, ulcerated, sigmoid colon mass at 30cm, biopsy positive for moderately differentiated invasive adenocarcinoma, MMR intact(YURIY), scope did traverse the lesion with some maneuvering, internal hemorrhoids noted, normal mucosa in terminal ileum, otherwise normal examination.  Surgery/pathology:  Laparoscopic sigmoid colectomy done 21--adenocarcinoma, moderately differentiated, measuring 3.5cm with invasion through the muscularis propria into pericolic adipose tissue, margins clear,  regional lymph nodes positive for metastatic adenocarcinoma, focal discontinuous extramural tumor extension c/w vascular invasion (pT3N2b).  Imagin. CT A/P w/ contrast done 21--very mild stranding along a short segment of sigmoid colon may correspond with the primary malignancy, no metastatic disease.   2. CT chest done 21--No acute abnormality. No CT evidence of metastatic disease.  3. CT C/A/P done 21--YOANNA.   4. CT C/A/P done 22--YOANNA.   5. CT C/A/P done 22--YOANNA.     2. Iron Deficiency Anemia--21     3. Idiopathic Thrombocytopenia Purpura - 21  Positive for HLA Class 1A and Anti IIa/IIIa antibodies on 21.      Procedures:   1. EGD done 21--erythema in antrum, c/w gastritis biopsy showing chronic gastritis with focal mild activity, H. pylori negative, normal mucosa in duodenum, normal esophagus and GE junction, moderate amount of bile in stomach.  2. Left subclavian mediport placed 6/15/21.  3. Colonoscopy 22--Dr. Enrique Comer showed colonic anastomosis at 15cm, healthy, internal hemorrhoids, normal mucosa in terminal ileum, repeat recommended in 3 years.       CEA:  5/18/21--3.01  6/10/21--<1.73  12/30/21--2.52  03/21/22--<1.73  06/17/22--<1.73  12/20/22--<1.73     Treatment history:  1. Laparoscopic sigmoid colectomy 5/18/21.  2. IV Injectafer 6/11/21 and 6/18/21.   3. FOLFOX X 12 cycles 6/16/21--12/10/21 (multiple dose reductions and delays for thrombocytopenia).  4. Nplate treatment for ITP 10/6/21--12/1/21.     Treatment plan:   Observation     Chief Complaint: Other Misc (Pt reports no new concerns today.)      HPI   Patient presents for follow-up of colon cancer accompanied by her sister. She is doing very well. Denies any new complaints. Recent CT shows YOANNA and I went over results with her.      Past Medical History:   Diagnosis Date    Cervical spondylosis     Colon cancer     GERD (gastroesophageal reflux disease)     MELIDA (iron deficiency anemia)       Review of patient's allergies indicates:  No Known Allergies   Current Outpatient Medications on File Prior to Visit   Medication Sig Dispense Refill    montelukast (SINGULAIR) 10 mg tablet Take 10 mg by mouth.      valACYclovir (VALTREX) 1000 MG tablet Take 1 g by mouth.       No current facility-administered medications on file prior to visit.      Review of Systems   Constitutional:  Negative for appetite change, fatigue, fever and unexpected weight change.   HENT:  Negative for mouth sores.    Eyes: Negative.  Negative for visual disturbance.   Respiratory:  Negative for cough and shortness of breath.    Cardiovascular:  Negative for chest pain and leg swelling.   Gastrointestinal:  Negative for abdominal distention, abdominal pain, constipation, diarrhea, nausea, vomiting and reflux.   Genitourinary:  Negative for difficulty urinating, dysuria, frequency and hematuria.   Musculoskeletal:  Negative for arthralgias and back pain.   Integumentary:  Negative for rash.   Neurological:  Negative for weakness and headaches.   Hematological:  Negative for adenopathy.   Psychiatric/Behavioral:  Negative for sleep  disturbance. The patient is not nervous/anxious.        Vitals:    12/22/22 0920   BP: 120/83   Pulse: 66   Resp: 14   Temp: 97.5 °F (36.4 °C)         Physical Exam  Constitutional:       Appearance: Normal appearance. She is normal weight.   HENT:      Head: Normocephalic.      Nose: Nose normal.      Mouth/Throat:      Mouth: Mucous membranes are moist.   Eyes:      General: Lids are normal. Vision grossly intact.      Extraocular Movements: Extraocular movements intact.      Conjunctiva/sclera: Conjunctivae normal.   Cardiovascular:      Rate and Rhythm: Normal rate and regular rhythm.      Heart sounds: Normal heart sounds.   Pulmonary:      Effort: Pulmonary effort is normal.      Breath sounds: Normal breath sounds.   Chest:      Comments: Left CW mediport intact  Abdominal:      General: Abdomen is flat. Bowel sounds are normal. There is no distension.      Palpations: Abdomen is soft.      Tenderness: There is no abdominal tenderness.      Comments: Scattered laparoscopic incisions healed   Musculoskeletal:         General: Normal range of motion.      Cervical back: Normal range of motion and neck supple.      Right lower leg: No edema.      Left lower leg: No edema.   Skin:     General: Skin is warm and moist.   Neurological:      General: No focal deficit present.      Mental Status: She is alert and oriented to person, place, and time.   Psychiatric:         Attention and Perception: Attention normal.         Mood and Affect: Mood normal.         Speech: Speech normal.         Behavior: Behavior is cooperative.         Cognition and Memory: Cognition normal.         Judgment: Judgment normal.       Lab Visit on 12/20/2022   Component Date Value    Sodium Level 12/20/2022 142     Potassium Level 12/20/2022 3.9     Chloride 12/20/2022 109 (H)     Carbon Dioxide 12/20/2022 26     Glucose Level 12/20/2022 84     Blood Urea Nitrogen 12/20/2022 18.1     Creatinine 12/20/2022 0.93     Calcium Level Total  12/20/2022 9.2     Protein Total 12/20/2022 7.0     Albumin Level 12/20/2022 4.0     Globulin 12/20/2022 3.0     Albumin/Globulin Ratio 12/20/2022 1.3     Bilirubin Total 12/20/2022 0.3     Alkaline Phosphatase 12/20/2022 75     Alanine Aminotransferase 12/20/2022 57 (H)     Aspartate Aminotransfera* 12/20/2022 32     eGFR 12/20/2022 >60     Carcinoembryonic Antigen 12/20/2022 <1.73     WBC 12/20/2022 4.7     RBC 12/20/2022 4.10 (L)     Hgb 12/20/2022 12.1     Hct 12/20/2022 38.3     MCV 12/20/2022 93.4     MCH 12/20/2022 29.5     MCHC 12/20/2022 31.6 (L)     RDW 12/20/2022 11.8     Platelet 12/20/2022 147     MPV 12/20/2022 9.4     Neut % 12/20/2022 45.2     Lymph % 12/20/2022 46.1     Mono % 12/20/2022 6.8     Eos % 12/20/2022 1.1     Basophil % 12/20/2022 0.4     Lymph # 12/20/2022 2.17     Neut # 12/20/2022 2.13     Mono # 12/20/2022 0.32     Eos # 12/20/2022 0.05     Baso # 12/20/2022 0.02     IG# 12/20/2022 0.02     IG% 12/20/2022 0.4       ECOG SCORE                Assessment:       1. Malignant neoplasm of sigmoid colon        Plan:       Patient with Stage IIIC Sigmoid colon cancer s/p surgery done 5/18/21. Tumor measured 3.5cm, T3 lesion, Grade 2 with vascular invasion, 7/16 LNs positive, YURIY.  Initial CT C/A/P w/o evidence for any distant metastatic disease.  Per NCCN guidelines, adjuvant treatment recommended with 6 months of FOLFOX chemotherapy. 6 months recommended as opposed to 3 months due to N2 disease, higher risk.  Discussed rationale for adjuvant treatment to decrease risk of recurrence and improve survival. Estimated OS around 75% with treatment.  Received IV Injectafer completed 6/18/21 for iron deficiency anemia.     Patient started cycle 1 FOLFOX on 6/16/21. Multiple dose reductions and delays due to thrombocytopenia.  Started on Nplate weekly 10/6/21 for ITP which helped with the continuation of her chemotherapy.  Completed cycle 12 FOLFOX on 12/10/21.  Colonoscopy from 5/9/22 all good.  Repeat needed in 3 years.   CT C/A/P from 12/16/22 with YOANNA.     Patient is doing very will without any signs or symptoms to suggest disease recurrence.  Labs recent show normal CBC. CMP with mild elevation in ALT. CEA WNL.    RTC 3 months for follow-up with repeat labs and visit.  Plan to repeat CT in 6 months.     Continue visits every 3 months until >2 years out which will be 6/2023, and continue CT scans every 6 months X 5 years per NCCN.   All questions answered at this time.   Continue mediport flushes.         Belén Underwood MD

## 2022-12-20 ENCOUNTER — LAB VISIT (OUTPATIENT)
Dept: LAB | Facility: HOSPITAL | Age: 52
End: 2022-12-20
Attending: INTERNAL MEDICINE
Payer: COMMERCIAL

## 2022-12-20 DIAGNOSIS — C18.7 MALIGNANT NEOPLASM OF SIGMOID COLON: ICD-10-CM

## 2022-12-20 LAB
ALBUMIN SERPL-MCNC: 4 G/DL (ref 3.5–5)
ALBUMIN/GLOB SERPL: 1.3 RATIO (ref 1.1–2)
ALP SERPL-CCNC: 75 UNIT/L (ref 40–150)
ALT SERPL-CCNC: 57 UNIT/L (ref 0–55)
AST SERPL-CCNC: 32 UNIT/L (ref 5–34)
BASOPHILS # BLD AUTO: 0.02 X10(3)/MCL (ref 0–0.2)
BASOPHILS NFR BLD AUTO: 0.4 %
BILIRUBIN DIRECT+TOT PNL SERPL-MCNC: 0.3 MG/DL
BUN SERPL-MCNC: 18.1 MG/DL (ref 9.8–20.1)
CALCIUM SERPL-MCNC: 9.2 MG/DL (ref 8.4–10.2)
CEA SERPL-MCNC: <1.73 NG/ML (ref 0–3)
CHLORIDE SERPL-SCNC: 109 MMOL/L (ref 98–107)
CO2 SERPL-SCNC: 26 MMOL/L (ref 22–29)
CREAT SERPL-MCNC: 0.93 MG/DL (ref 0.55–1.02)
EOSINOPHIL # BLD AUTO: 0.05 X10(3)/MCL (ref 0–0.9)
EOSINOPHIL NFR BLD AUTO: 1.1 %
ERYTHROCYTE [DISTWIDTH] IN BLOOD BY AUTOMATED COUNT: 11.8 % (ref 11–14.5)
GFR SERPLBLD CREATININE-BSD FMLA CKD-EPI: >60 MLS/MIN/1.73/M2
GLOBULIN SER-MCNC: 3 GM/DL (ref 2.4–3.5)
GLUCOSE SERPL-MCNC: 84 MG/DL (ref 74–100)
HCT VFR BLD AUTO: 38.3 % (ref 37–47)
HGB BLD-MCNC: 12.1 GM/DL (ref 12–16)
IMM GRANULOCYTES # BLD AUTO: 0.02 X10(3)/MCL (ref 0–0.04)
IMM GRANULOCYTES NFR BLD AUTO: 0.4 %
LYMPHOCYTES # BLD AUTO: 2.17 X10(3)/MCL (ref 0.6–4.6)
LYMPHOCYTES NFR BLD AUTO: 46.1 %
MCH RBC QN AUTO: 29.5 PG
MCHC RBC AUTO-ENTMCNC: 31.6 MG/DL (ref 33–36)
MCV RBC AUTO: 93.4 FL (ref 80–94)
MONOCYTES # BLD AUTO: 0.32 X10(3)/MCL (ref 0.1–1.3)
MONOCYTES NFR BLD AUTO: 6.8 %
NEUTROPHILS # BLD AUTO: 2.13 X10(3)/MCL (ref 2.1–9.2)
NEUTROPHILS NFR BLD AUTO: 45.2 %
PLATELET # BLD AUTO: 147 X10(3)/MCL (ref 140–371)
PMV BLD AUTO: 9.4 FL (ref 9.4–12.4)
POTASSIUM SERPL-SCNC: 3.9 MMOL/L (ref 3.5–5.1)
PROT SERPL-MCNC: 7 GM/DL (ref 6.4–8.3)
RBC # BLD AUTO: 4.1 X10(6)/MCL (ref 4.2–5.4)
SODIUM SERPL-SCNC: 142 MMOL/L (ref 136–145)
WBC # SPEC AUTO: 4.7 X10(3)/MCL (ref 4.5–11.5)

## 2022-12-20 PROCEDURE — 82378 CARCINOEMBRYONIC ANTIGEN: CPT

## 2022-12-20 PROCEDURE — 36415 COLL VENOUS BLD VENIPUNCTURE: CPT

## 2022-12-20 PROCEDURE — 85025 COMPLETE CBC W/AUTO DIFF WBC: CPT

## 2022-12-20 PROCEDURE — 80053 COMPREHEN METABOLIC PANEL: CPT

## 2022-12-22 ENCOUNTER — OFFICE VISIT (OUTPATIENT)
Dept: HEMATOLOGY/ONCOLOGY | Facility: CLINIC | Age: 52
End: 2022-12-22
Payer: COMMERCIAL

## 2022-12-22 ENCOUNTER — TELEPHONE (OUTPATIENT)
Dept: HEMATOLOGY/ONCOLOGY | Facility: CLINIC | Age: 52
End: 2022-12-22

## 2022-12-22 VITALS
RESPIRATION RATE: 14 BRPM | HEART RATE: 66 BPM | OXYGEN SATURATION: 99 % | SYSTOLIC BLOOD PRESSURE: 120 MMHG | HEIGHT: 62 IN | DIASTOLIC BLOOD PRESSURE: 83 MMHG | BODY MASS INDEX: 30.71 KG/M2 | WEIGHT: 166.88 LBS | TEMPERATURE: 98 F

## 2022-12-22 DIAGNOSIS — C18.7 MALIGNANT NEOPLASM OF SIGMOID COLON: Primary | ICD-10-CM

## 2022-12-22 PROCEDURE — 3079F PR MOST RECENT DIASTOLIC BLOOD PRESSURE 80-89 MM HG: ICD-10-PCS | Mod: CPTII,S$GLB,, | Performed by: INTERNAL MEDICINE

## 2022-12-22 PROCEDURE — 3074F PR MOST RECENT SYSTOLIC BLOOD PRESSURE < 130 MM HG: ICD-10-PCS | Mod: CPTII,S$GLB,, | Performed by: INTERNAL MEDICINE

## 2022-12-22 PROCEDURE — 1160F PR REVIEW ALL MEDS BY PRESCRIBER/CLIN PHARMACIST DOCUMENTED: ICD-10-PCS | Mod: CPTII,S$GLB,, | Performed by: INTERNAL MEDICINE

## 2022-12-22 PROCEDURE — 99999 PR PBB SHADOW E&M-EST. PATIENT-LVL III: ICD-10-PCS | Mod: PBBFAC,,, | Performed by: INTERNAL MEDICINE

## 2022-12-22 PROCEDURE — 1159F PR MEDICATION LIST DOCUMENTED IN MEDICAL RECORD: ICD-10-PCS | Mod: CPTII,S$GLB,, | Performed by: INTERNAL MEDICINE

## 2022-12-22 PROCEDURE — 3079F DIAST BP 80-89 MM HG: CPT | Mod: CPTII,S$GLB,, | Performed by: INTERNAL MEDICINE

## 2022-12-22 PROCEDURE — 1159F MED LIST DOCD IN RCRD: CPT | Mod: CPTII,S$GLB,, | Performed by: INTERNAL MEDICINE

## 2022-12-22 PROCEDURE — 3008F BODY MASS INDEX DOCD: CPT | Mod: CPTII,S$GLB,, | Performed by: INTERNAL MEDICINE

## 2022-12-22 PROCEDURE — 3074F SYST BP LT 130 MM HG: CPT | Mod: CPTII,S$GLB,, | Performed by: INTERNAL MEDICINE

## 2022-12-22 PROCEDURE — 99999 PR PBB SHADOW E&M-EST. PATIENT-LVL III: CPT | Mod: PBBFAC,,, | Performed by: INTERNAL MEDICINE

## 2022-12-22 PROCEDURE — 99214 PR OFFICE/OUTPT VISIT, EST, LEVL IV, 30-39 MIN: ICD-10-PCS | Mod: S$GLB,,, | Performed by: INTERNAL MEDICINE

## 2022-12-22 PROCEDURE — 3008F PR BODY MASS INDEX (BMI) DOCUMENTED: ICD-10-PCS | Mod: CPTII,S$GLB,, | Performed by: INTERNAL MEDICINE

## 2022-12-22 PROCEDURE — 1160F RVW MEDS BY RX/DR IN RCRD: CPT | Mod: CPTII,S$GLB,, | Performed by: INTERNAL MEDICINE

## 2022-12-22 PROCEDURE — 99214 OFFICE O/P EST MOD 30 MIN: CPT | Mod: S$GLB,,, | Performed by: INTERNAL MEDICINE

## 2023-02-02 ENCOUNTER — OFFICE VISIT (OUTPATIENT)
Dept: URGENT CARE | Facility: CLINIC | Age: 53
End: 2023-02-02
Payer: COMMERCIAL

## 2023-02-02 VITALS
OXYGEN SATURATION: 96 % | SYSTOLIC BLOOD PRESSURE: 118 MMHG | DIASTOLIC BLOOD PRESSURE: 79 MMHG | TEMPERATURE: 99 F | HEIGHT: 62 IN | BODY MASS INDEX: 30.55 KG/M2 | HEART RATE: 87 BPM | RESPIRATION RATE: 20 BRPM | WEIGHT: 166 LBS

## 2023-02-02 DIAGNOSIS — R50.9 FEVER, UNSPECIFIED FEVER CAUSE: ICD-10-CM

## 2023-02-02 DIAGNOSIS — J00 NASOPHARYNGITIS: Primary | ICD-10-CM

## 2023-02-02 LAB
CTP QC/QA: YES
MOLECULAR STREP A: NEGATIVE
POC MOLECULAR INFLUENZA A AGN: NEGATIVE
POC MOLECULAR INFLUENZA B AGN: NEGATIVE
SARS-COV-2 RDRP RESP QL NAA+PROBE: NEGATIVE

## 2023-02-02 PROCEDURE — 1160F PR REVIEW ALL MEDS BY PRESCRIBER/CLIN PHARMACIST DOCUMENTED: ICD-10-PCS | Mod: CPTII,,, | Performed by: FAMILY MEDICINE

## 2023-02-02 PROCEDURE — 87635 SARS-COV-2 COVID-19 AMP PRB: CPT | Mod: QW,,, | Performed by: FAMILY MEDICINE

## 2023-02-02 PROCEDURE — 99213 PR OFFICE/OUTPT VISIT, EST, LEVL III, 20-29 MIN: ICD-10-PCS | Mod: ,,, | Performed by: FAMILY MEDICINE

## 2023-02-02 PROCEDURE — 1159F MED LIST DOCD IN RCRD: CPT | Mod: CPTII,,, | Performed by: FAMILY MEDICINE

## 2023-02-02 PROCEDURE — 87635: ICD-10-PCS | Mod: QW,,, | Performed by: FAMILY MEDICINE

## 2023-02-02 PROCEDURE — 3078F DIAST BP <80 MM HG: CPT | Mod: CPTII,,, | Performed by: FAMILY MEDICINE

## 2023-02-02 PROCEDURE — 1160F RVW MEDS BY RX/DR IN RCRD: CPT | Mod: CPTII,,, | Performed by: FAMILY MEDICINE

## 2023-02-02 PROCEDURE — 87651 STREP A DNA AMP PROBE: CPT | Mod: QW,,, | Performed by: FAMILY MEDICINE

## 2023-02-02 PROCEDURE — 87502 POCT INFLUENZA A/B MOLECULAR: ICD-10-PCS | Mod: QW,,, | Performed by: FAMILY MEDICINE

## 2023-02-02 PROCEDURE — 3078F PR MOST RECENT DIASTOLIC BLOOD PRESSURE < 80 MM HG: ICD-10-PCS | Mod: CPTII,,, | Performed by: FAMILY MEDICINE

## 2023-02-02 PROCEDURE — 3008F PR BODY MASS INDEX (BMI) DOCUMENTED: ICD-10-PCS | Mod: CPTII,,, | Performed by: FAMILY MEDICINE

## 2023-02-02 PROCEDURE — 99213 OFFICE O/P EST LOW 20 MIN: CPT | Mod: ,,, | Performed by: FAMILY MEDICINE

## 2023-02-02 PROCEDURE — 1159F PR MEDICATION LIST DOCUMENTED IN MEDICAL RECORD: ICD-10-PCS | Mod: CPTII,,, | Performed by: FAMILY MEDICINE

## 2023-02-02 PROCEDURE — 3074F SYST BP LT 130 MM HG: CPT | Mod: CPTII,,, | Performed by: FAMILY MEDICINE

## 2023-02-02 PROCEDURE — 3074F PR MOST RECENT SYSTOLIC BLOOD PRESSURE < 130 MM HG: ICD-10-PCS | Mod: CPTII,,, | Performed by: FAMILY MEDICINE

## 2023-02-02 PROCEDURE — 3008F BODY MASS INDEX DOCD: CPT | Mod: CPTII,,, | Performed by: FAMILY MEDICINE

## 2023-02-02 PROCEDURE — 87651 POCT STREP A MOLECULAR: ICD-10-PCS | Mod: QW,,, | Performed by: FAMILY MEDICINE

## 2023-02-02 PROCEDURE — 87502 INFLUENZA DNA AMP PROBE: CPT | Mod: QW,,, | Performed by: FAMILY MEDICINE

## 2023-02-02 RX ORDER — PREDNISONE 20 MG/1
20 TABLET ORAL 2 TIMES DAILY
Qty: 6 TABLET | Refills: 0 | Status: SHIPPED | OUTPATIENT
Start: 2023-02-02 | End: 2023-02-05

## 2023-02-02 NOTE — PATIENT INSTRUCTIONS
Flonase and saline nasal spray twice a day, antihistamine at bedtime.  Force fluids.  Prednisone with food. Cough may linger a few weeks but should not have fever, chest pain, or shortness of breath.     With less than 24 hours from fever onset it may be too early for the tests to detect the illness.  You can return in the next 3 days for reswab if desires. It will be through a nurse visit.

## 2023-02-02 NOTE — PROGRESS NOTES
"Subjective:       Patient ID: Melissa Jones is a 52 y.o. female.    Vitals:  height is 5' 2" (1.575 m) and weight is 75.3 kg (166 lb). Her temperature is 98.5 °F (36.9 °C). Her blood pressure is 118/79 and her pulse is 87. Her respiration is 20 and oxygen saturation is 96%.     Chief Complaint: Fever (Cough, BA, chills, sore throat, highest 103.0 last night started 01/30 around friends, taken tylenol around 5 this morning  )    1 days of cough, congestion and last night had fever.       Constitution: Positive for fever. Negative for chills and fatigue.   HENT:  Positive for postnasal drip. Negative for congestion, sinus pressure and trouble swallowing.    Neck: Negative for neck pain and neck stiffness.   Cardiovascular:  Negative for chest pain, leg swelling and sob on exertion.   Respiratory:  Positive for cough. Negative for chest tightness, shortness of breath and wheezing.    Neurological:  Negative for dizziness, disorientation and altered mental status.   Psychiatric/Behavioral:  Negative for altered mental status and disorientation.      Objective:      Physical Exam   Constitutional: She is oriented to person, place, and time. She appears well-developed. No distress.   HENT:   Head: Normocephalic.   Ears:   Right Ear: Tympanic membrane and external ear normal.   Left Ear: Tympanic membrane and external ear normal.   Nose: Rhinorrhea present.   Mouth/Throat: Uvula is midline and mucous membranes are normal. Mucous membranes are moist. No uvula swelling. Cobblestoning present. No oropharyngeal exudate or posterior oropharyngeal edema. Tonsils are 0 on the right. Tonsils are 0 on the left. No tonsillar exudate.   Eyes: Pupils are equal, round, and reactive to light. Right eye exhibits no discharge. Left eye exhibits no discharge.   Neck: Neck supple. No tracheal deviation present.   Cardiovascular: Normal rate, regular rhythm and normal heart sounds.   No murmur heard.  Pulmonary/Chest: Effort normal and " breath sounds normal. No stridor. No respiratory distress. She has no wheezes.   Lymphadenopathy:     She has no cervical adenopathy.   Neurological: no focal deficit. She is alert and oriented to person, place, and time.   Skin: Skin is warm and dry. Capillary refill takes less than 2 seconds.   Psychiatric: Mood and thought content normal.   Nursing note and vitals reviewed.      Assessment:       1. Fever, unspecified fever cause            Plan:         Fever, unspecified fever cause  -     POCT COVID-19 Rapid Screening  -     POCT Influenza A/B MOLECULAR  -     POCT Strep A, Molecular            COVID, Flu and strep negative.

## 2023-02-07 ENCOUNTER — OFFICE VISIT (OUTPATIENT)
Dept: URGENT CARE | Facility: CLINIC | Age: 53
End: 2023-02-07
Payer: COMMERCIAL

## 2023-02-07 VITALS
BODY MASS INDEX: 30.55 KG/M2 | OXYGEN SATURATION: 97 % | SYSTOLIC BLOOD PRESSURE: 118 MMHG | TEMPERATURE: 98 F | HEART RATE: 83 BPM | DIASTOLIC BLOOD PRESSURE: 84 MMHG | HEIGHT: 62 IN | RESPIRATION RATE: 20 BRPM | WEIGHT: 166 LBS

## 2023-02-07 DIAGNOSIS — R05.9 COUGH, UNSPECIFIED TYPE: ICD-10-CM

## 2023-02-07 DIAGNOSIS — J02.9 SORE THROAT: ICD-10-CM

## 2023-02-07 DIAGNOSIS — J01.40 ACUTE NON-RECURRENT PANSINUSITIS: ICD-10-CM

## 2023-02-07 DIAGNOSIS — J00 NASOPHARYNGITIS: Primary | ICD-10-CM

## 2023-02-07 PROCEDURE — 87502 POCT INFLUENZA A/B MOLECULAR: ICD-10-PCS | Mod: QW,,, | Performed by: FAMILY MEDICINE

## 2023-02-07 PROCEDURE — 87635 SARS-COV-2 COVID-19 AMP PRB: CPT | Mod: QW,,, | Performed by: FAMILY MEDICINE

## 2023-02-07 PROCEDURE — 1159F PR MEDICATION LIST DOCUMENTED IN MEDICAL RECORD: ICD-10-PCS | Mod: CPTII,,, | Performed by: FAMILY MEDICINE

## 2023-02-07 PROCEDURE — 87651 STREP A DNA AMP PROBE: CPT | Mod: QW,,, | Performed by: FAMILY MEDICINE

## 2023-02-07 PROCEDURE — 3074F PR MOST RECENT SYSTOLIC BLOOD PRESSURE < 130 MM HG: ICD-10-PCS | Mod: CPTII,,, | Performed by: FAMILY MEDICINE

## 2023-02-07 PROCEDURE — 3079F PR MOST RECENT DIASTOLIC BLOOD PRESSURE 80-89 MM HG: ICD-10-PCS | Mod: CPTII,,, | Performed by: FAMILY MEDICINE

## 2023-02-07 PROCEDURE — 3008F BODY MASS INDEX DOCD: CPT | Mod: CPTII,,, | Performed by: FAMILY MEDICINE

## 2023-02-07 PROCEDURE — 87635: ICD-10-PCS | Mod: QW,,, | Performed by: FAMILY MEDICINE

## 2023-02-07 PROCEDURE — 87651 POCT STREP A MOLECULAR: ICD-10-PCS | Mod: QW,,, | Performed by: FAMILY MEDICINE

## 2023-02-07 PROCEDURE — 3079F DIAST BP 80-89 MM HG: CPT | Mod: CPTII,,, | Performed by: FAMILY MEDICINE

## 2023-02-07 PROCEDURE — 99213 OFFICE O/P EST LOW 20 MIN: CPT | Mod: ,,, | Performed by: FAMILY MEDICINE

## 2023-02-07 PROCEDURE — 99213 PR OFFICE/OUTPT VISIT, EST, LEVL III, 20-29 MIN: ICD-10-PCS | Mod: ,,, | Performed by: FAMILY MEDICINE

## 2023-02-07 PROCEDURE — 1159F MED LIST DOCD IN RCRD: CPT | Mod: CPTII,,, | Performed by: FAMILY MEDICINE

## 2023-02-07 PROCEDURE — 3074F SYST BP LT 130 MM HG: CPT | Mod: CPTII,,, | Performed by: FAMILY MEDICINE

## 2023-02-07 PROCEDURE — 3008F PR BODY MASS INDEX (BMI) DOCUMENTED: ICD-10-PCS | Mod: CPTII,,, | Performed by: FAMILY MEDICINE

## 2023-02-07 PROCEDURE — 87502 INFLUENZA DNA AMP PROBE: CPT | Mod: QW,,, | Performed by: FAMILY MEDICINE

## 2023-02-07 RX ORDER — ALBUTEROL SULFATE 90 UG/1
2 AEROSOL, METERED RESPIRATORY (INHALATION) EVERY 6 HOURS PRN
Qty: 18 G | Refills: 0 | Status: SHIPPED | OUTPATIENT
Start: 2023-02-07 | End: 2023-12-15

## 2023-02-07 NOTE — PROGRESS NOTES
"Subjective:       Patient ID: Melissa Jones is a 52 y.o. female.    Vitals:  height is 5' 2" (1.575 m) and weight is 75.3 kg (166 lb). Her oral temperature is 98 °F (36.7 °C). Her blood pressure is 118/84 and her pulse is 83. Her respiration is 20 and oxygen saturation is 97%.     Chief Complaint: Cough (Cough, fever (Tmax 102), sore throat x 7 days. Evaluated in clinic on 2/2 for same s/s. )    6-7 days of fever, cough and congestion.  Last day of fever was 3 days ago.  No CP or SOB.       Constitution: Positive for fever. Negative for chills and fatigue.   HENT:  Positive for postnasal drip. Negative for congestion, sinus pressure and trouble swallowing.    Neck: Negative for neck pain and neck stiffness.   Cardiovascular:  Negative for chest pain, leg swelling and sob on exertion.   Respiratory:  Positive for cough. Negative for chest tightness, shortness of breath and wheezing.    Neurological:  Negative for dizziness, disorientation and altered mental status.   Psychiatric/Behavioral:  Negative for altered mental status and disorientation.      Objective:      Physical Exam   Constitutional: She is oriented to person, place, and time. She appears well-developed. No distress.   HENT:   Head: Normocephalic.   Ears:   Right Ear: Tympanic membrane and external ear normal.   Left Ear: Tympanic membrane and external ear normal.   Nose: Rhinorrhea present.   Mouth/Throat: Uvula is midline and mucous membranes are normal. No uvula swelling. Cobblestoning present. No oropharyngeal exudate or posterior oropharyngeal edema. Tonsils are 0 on the right. Tonsils are 0 on the left. No tonsillar exudate.   Eyes: Pupils are equal, round, and reactive to light. Right eye exhibits no discharge. Left eye exhibits no discharge.   Neck: Neck supple. No tracheal deviation present.   Cardiovascular: Normal rate, regular rhythm and normal heart sounds.   No murmur heard.  Pulmonary/Chest: Effort normal and breath sounds normal. No " stridor. No respiratory distress. She has no wheezes.   Lymphadenopathy:     She has no cervical adenopathy.   Neurological: no focal deficit. She is alert and oriented to person, place, and time.   Skin: Skin is warm and dry.   Psychiatric: Mood and thought content normal.   Nursing note and vitals reviewed.      Assessment:       1. Nasopharyngitis    2. Cough, unspecified type    3. Sore throat    4. Acute non-recurrent pansinusitis            Plan:         Nasopharyngitis  -     albuterol (PROAIR HFA) 90 mcg/actuation inhaler; Inhale 2 puffs into the lungs every 6 (six) hours as needed for Wheezing. Rescue  Dispense: 18 g; Refill: 0    Cough, unspecified type  -     POCT Influenza A/B Molecular  -     POCT COVID-19 Rapid Screening    Sore throat  -     POCT Strep A, Molecular    Acute non-recurrent pansinusitis            COVID test negative.   Flu test negative.   Strep negative.

## 2023-02-07 NOTE — PATIENT INSTRUCTIONS
Flonase and saline nasal spray twice a day, antihistamine at bedtime.  Force fluids.  Prednisone with food. Use albuterol inhaler two puffs every 6 hours as needed for wheeze. Cough may linger a few weeks but should not have fever, chest pain, or shortness of breath.

## 2023-03-13 ENCOUNTER — INFUSION (OUTPATIENT)
Dept: INFUSION THERAPY | Facility: HOSPITAL | Age: 53
End: 2023-03-13
Attending: INTERNAL MEDICINE
Payer: COMMERCIAL

## 2023-03-13 ENCOUNTER — LAB VISIT (OUTPATIENT)
Dept: LAB | Facility: HOSPITAL | Age: 53
End: 2023-03-13
Attending: NURSE PRACTITIONER
Payer: COMMERCIAL

## 2023-03-13 VITALS
HEART RATE: 66 BPM | RESPIRATION RATE: 18 BRPM | DIASTOLIC BLOOD PRESSURE: 62 MMHG | TEMPERATURE: 98 F | SYSTOLIC BLOOD PRESSURE: 128 MMHG

## 2023-03-13 DIAGNOSIS — C18.7 MALIGNANT NEOPLASM OF SIGMOID COLON: ICD-10-CM

## 2023-03-13 DIAGNOSIS — C18.7 MALIGNANT NEOPLASM OF SIGMOID COLON: Primary | ICD-10-CM

## 2023-03-13 PROBLEM — M75.81 TENDINITIS OF RIGHT ROTATOR CUFF: Status: ACTIVE | Noted: 2023-03-13

## 2023-03-13 LAB
ALBUMIN SERPL-MCNC: 4 G/DL (ref 3.5–5)
ALBUMIN/GLOB SERPL: 1.4 RATIO (ref 1.1–2)
ALP SERPL-CCNC: 70 UNIT/L (ref 40–150)
ALT SERPL-CCNC: 38 UNIT/L (ref 0–55)
AST SERPL-CCNC: 28 UNIT/L (ref 5–34)
BASOPHILS # BLD AUTO: 0.02 X10(3)/MCL (ref 0–0.2)
BASOPHILS NFR BLD AUTO: 0.4 %
BILIRUBIN DIRECT+TOT PNL SERPL-MCNC: 0.3 MG/DL
BUN SERPL-MCNC: 18.2 MG/DL (ref 9.8–20.1)
CALCIUM SERPL-MCNC: 9.5 MG/DL (ref 8.4–10.2)
CEA SERPL-MCNC: <1.73 NG/ML (ref 0–3)
CHLORIDE SERPL-SCNC: 109 MMOL/L (ref 98–107)
CO2 SERPL-SCNC: 25 MMOL/L (ref 22–29)
CREAT SERPL-MCNC: 1.06 MG/DL (ref 0.55–1.02)
EOSINOPHIL # BLD AUTO: 0.04 X10(3)/MCL (ref 0–0.9)
EOSINOPHIL NFR BLD AUTO: 0.9 %
ERYTHROCYTE [DISTWIDTH] IN BLOOD BY AUTOMATED COUNT: 11.8 % (ref 11.5–17)
GFR SERPLBLD CREATININE-BSD FMLA CKD-EPI: >60 MLS/MIN/1.73/M2
GLOBULIN SER-MCNC: 2.9 GM/DL (ref 2.4–3.5)
GLUCOSE SERPL-MCNC: 102 MG/DL (ref 74–100)
HCT VFR BLD AUTO: 39.3 % (ref 37–47)
HGB BLD-MCNC: 12.7 G/DL (ref 12–16)
IMM GRANULOCYTES # BLD AUTO: 0.01 X10(3)/MCL (ref 0–0.04)
IMM GRANULOCYTES NFR BLD AUTO: 0.2 %
LYMPHOCYTES # BLD AUTO: 2.06 X10(3)/MCL (ref 0.6–4.6)
LYMPHOCYTES NFR BLD AUTO: 44.4 %
MCH RBC QN AUTO: 30.3 PG
MCHC RBC AUTO-ENTMCNC: 32.3 G/DL (ref 33–36)
MCV RBC AUTO: 93.8 FL (ref 80–94)
MONOCYTES # BLD AUTO: 0.45 X10(3)/MCL (ref 0.1–1.3)
MONOCYTES NFR BLD AUTO: 9.7 %
NEUTROPHILS # BLD AUTO: 2.06 X10(3)/MCL (ref 2.1–9.2)
NEUTROPHILS NFR BLD AUTO: 44.4 %
PLATELET # BLD AUTO: 166 X10(3)/MCL (ref 130–400)
PMV BLD AUTO: 9.3 FL (ref 7.4–10.4)
POTASSIUM SERPL-SCNC: 4.8 MMOL/L (ref 3.5–5.1)
PROT SERPL-MCNC: 6.9 GM/DL (ref 6.4–8.3)
RBC # BLD AUTO: 4.19 X10(6)/MCL (ref 4.2–5.4)
SODIUM SERPL-SCNC: 142 MMOL/L (ref 136–145)
WBC # SPEC AUTO: 4.6 X10(3)/MCL (ref 4.5–11.5)

## 2023-03-13 PROCEDURE — 85025 COMPLETE CBC W/AUTO DIFF WBC: CPT

## 2023-03-13 PROCEDURE — 36415 COLL VENOUS BLD VENIPUNCTURE: CPT

## 2023-03-13 PROCEDURE — 80053 COMPREHEN METABOLIC PANEL: CPT

## 2023-03-13 PROCEDURE — 96523 IRRIG DRUG DELIVERY DEVICE: CPT

## 2023-03-13 PROCEDURE — 63600175 PHARM REV CODE 636 W HCPCS: Performed by: INTERNAL MEDICINE

## 2023-03-13 PROCEDURE — 82378 CARCINOEMBRYONIC ANTIGEN: CPT

## 2023-03-13 RX ORDER — SODIUM CHLORIDE 0.9 % (FLUSH) 0.9 %
10 SYRINGE (ML) INJECTION
Status: DISCONTINUED | OUTPATIENT
Start: 2023-03-13 | End: 2023-03-13 | Stop reason: HOSPADM

## 2023-03-13 RX ORDER — SODIUM CHLORIDE 0.9 % (FLUSH) 0.9 %
10 SYRINGE (ML) INJECTION
Status: CANCELLED | OUTPATIENT
Start: 2023-06-05

## 2023-03-13 RX ORDER — HEPARIN 100 UNIT/ML
500 SYRINGE INTRAVENOUS
Status: CANCELLED | OUTPATIENT
Start: 2023-06-05

## 2023-03-13 RX ORDER — HEPARIN 100 UNIT/ML
500 SYRINGE INTRAVENOUS
Status: DISCONTINUED | OUTPATIENT
Start: 2023-03-13 | End: 2023-03-13 | Stop reason: HOSPADM

## 2023-03-13 RX ADMIN — HEPARIN 500 UNITS: 100 SYRINGE at 03:03

## 2023-03-13 NOTE — PROGRESS NOTES
Subjective:       Patient ID: Melissa Jones is a 52 y.o. female.    Surgeon: Dr. Cruz Farmer  GI: Dr. Enrique Comer     1. Sigmoid Colon Cancer--Stage IIIC ([id number])--Diagnosed 21  Biopsy/pathology:  Colonoscopy done 21--circumferential, ulcerated, sigmoid colon mass at 30cm, biopsy positive for moderately differentiated invasive adenocarcinoma, MMR intact(YURIY), scope did traverse the lesion with some maneuvering, internal hemorrhoids noted, normal mucosa in terminal ileum, otherwise normal examination.  Surgery/pathology:  Laparoscopic sigmoid colectomy done 21--adenocarcinoma, moderately differentiated, measuring 3.5cm with invasion through the muscularis propria into pericolic adipose tissue, margins clear,  regional lymph nodes positive for metastatic adenocarcinoma, focal discontinuous extramural tumor extension c/w vascular invasion (pT3N2b).  Imagin. CT A/P w/ contrast done 21--very mild stranding along a short segment of sigmoid colon may correspond with the primary malignancy, no metastatic disease.   2. CT chest done 21--No acute abnormality. No CT evidence of metastatic disease.  3. CT C/A/P done 21--YOANNA.   4. CT C/A/P done 22--YOANNA.   5. CT C/A/P done 22--YOANNA.     2. Iron Deficiency Anemia--21     3. Idiopathic Thrombocytopenia Purpura - 21  Positive for HLA Class 1A and Anti IIa/IIIa antibodies on 21.      Procedures:   1. EGD done 21--erythema in antrum, c/w gastritis biopsy showing chronic gastritis with focal mild activity, H. pylori negative, normal mucosa in duodenum, normal esophagus and GE junction, moderate amount of bile in stomach.  2. Left subclavian mediport placed 6/15/21.  3. Colonoscopy 22--Dr. Enrique Comer showed colonic anastomosis at 15cm, healthy, internal hemorrhoids, normal mucosa in terminal ileum, repeat recommended in 3 years.       CEA:  05/18/21--3.01  06/10/21--<1.73  12/30/21--2.52  03/21/22--<1.73  06/17/22--<1.73  12/20/22--<1.73  03/13/23--<1.73     Treatment history:  1. Laparoscopic sigmoid colectomy 5/18/21.  2. IV Injectafer 6/11/21 and 6/18/21.   3. FOLFOX X 12 cycles 6/16/21--12/10/21 (multiple dose reductions and delays for thrombocytopenia).  4. Nplate treatment for ITP 10/6/21--12/1/21.     Treatment plan:  Observation     Chief Complaint: Other Misc (Pt reports no new concerns today.)    HPI   Patient presents for follow-up of colon cancer accompanied by her mother. She is doing very well. Appetite good and weight stable. Bowels are moving well. Mentions she had a viral illness that lasted 3 weeks last month. She is now feeling better. Denies any new complaints.    Past Medical History:   Diagnosis Date    Cervical spondylosis     Colon cancer     GERD (gastroesophageal reflux disease)     MELIDA (iron deficiency anemia)       Review of patient's allergies indicates:  No Known Allergies   Current Outpatient Medications on File Prior to Visit   Medication Sig Dispense Refill    b complex vitamins capsule Take 1 capsule by mouth once daily.      levocetirizine (XYZAL) 5 MG tablet Take 5 mg by mouth every evening.      albuterol (PROAIR HFA) 90 mcg/actuation inhaler Inhale 2 puffs into the lungs every 6 (six) hours as needed for Wheezing. Rescue (Patient not taking: Reported on 3/20/2023) 18 g 0    montelukast (SINGULAIR) 10 mg tablet Take 10 mg by mouth.      valACYclovir (VALTREX) 1000 MG tablet Take 1 g by mouth.       No current facility-administered medications on file prior to visit.      Review of Systems   Constitutional:  Negative for appetite change, fatigue, fever and unexpected weight change.   HENT:  Negative for mouth sores.    Eyes: Negative.  Negative for visual disturbance.   Respiratory:  Negative for cough and shortness of breath.    Cardiovascular:  Negative for chest pain and leg swelling.   Gastrointestinal:   Negative for abdominal distention, abdominal pain, constipation, diarrhea, nausea, vomiting and reflux.   Genitourinary:  Negative for difficulty urinating, dysuria, frequency and hematuria.   Musculoskeletal:  Negative for arthralgias and back pain.   Integumentary:  Negative for rash.   Neurological:  Negative for weakness and headaches.   Hematological:  Negative for adenopathy.   Psychiatric/Behavioral:  Negative for sleep disturbance. The patient is not nervous/anxious.        Vitals:    03/20/23 0903   BP: 130/89   Pulse: 70   Resp: 14   Temp: 97.5 °F (36.4 °C)     Physical Exam  Constitutional:       Appearance: Normal appearance. She is normal weight.   HENT:      Head: Normocephalic.      Nose: Nose normal.      Mouth/Throat:      Mouth: Mucous membranes are moist.   Eyes:      General: Lids are normal. Vision grossly intact.      Extraocular Movements: Extraocular movements intact.      Conjunctiva/sclera: Conjunctivae normal.   Cardiovascular:      Rate and Rhythm: Normal rate and regular rhythm.      Heart sounds: Normal heart sounds.   Pulmonary:      Effort: Pulmonary effort is normal.      Breath sounds: Normal breath sounds.   Chest:      Comments: Left CW mediport intact  Abdominal:      General: Abdomen is flat. Bowel sounds are normal. There is no distension.      Palpations: Abdomen is soft.      Tenderness: There is no abdominal tenderness.      Comments: Scattered laparoscopic incisions healed   Musculoskeletal:         General: Normal range of motion.      Cervical back: Normal range of motion and neck supple.      Right lower leg: No edema.      Left lower leg: No edema.   Skin:     General: Skin is warm and moist.   Neurological:      General: No focal deficit present.      Mental Status: She is alert and oriented to person, place, and time.   Psychiatric:         Attention and Perception: Attention normal.         Mood and Affect: Mood normal.         Speech: Speech normal.          Behavior: Behavior is cooperative.         Cognition and Memory: Cognition normal.         Judgment: Judgment normal.       Lab Visit on 03/13/2023   Component Date Value    Sodium Level 03/13/2023 142     Potassium Level 03/13/2023 4.8     Chloride 03/13/2023 109 (H)     Carbon Dioxide 03/13/2023 25     Glucose Level 03/13/2023 102 (H)     Blood Urea Nitrogen 03/13/2023 18.2     Creatinine 03/13/2023 1.06 (H)     Calcium Level Total 03/13/2023 9.5     Protein Total 03/13/2023 6.9     Albumin Level 03/13/2023 4.0     Globulin 03/13/2023 2.9     Albumin/Globulin Ratio 03/13/2023 1.4     Bilirubin Total 03/13/2023 0.3     Alkaline Phosphatase 03/13/2023 70     Alanine Aminotransferase 03/13/2023 38     Aspartate Aminotransfera* 03/13/2023 28     eGFR 03/13/2023 >60     Carcinoembryonic Antigen 03/13/2023 <1.73     WBC 03/13/2023 4.6     RBC 03/13/2023 4.19 (L)     Hgb 03/13/2023 12.7     Hct 03/13/2023 39.3     MCV 03/13/2023 93.8     MCH 03/13/2023 30.3     MCHC 03/13/2023 32.3 (L)     RDW 03/13/2023 11.8     Platelet 03/13/2023 166     MPV 03/13/2023 9.3     Neut % 03/13/2023 44.4     Lymph % 03/13/2023 44.4     Mono % 03/13/2023 9.7     Eos % 03/13/2023 0.9     Basophil % 03/13/2023 0.4     Lymph # 03/13/2023 2.06     Neut # 03/13/2023 2.06 (L)     Mono # 03/13/2023 0.45     Eos # 03/13/2023 0.04     Baso # 03/13/2023 0.02     IG# 03/13/2023 0.01     IG% 03/13/2023 0.2       ECOG SCORE    0 - Fully active-able to carry on all pre-disease performance without restriction            Assessment:       1. Malignant neoplasm of sigmoid colon        Plan:       Patient with Stage IIIC Sigmoid colon cancer s/p surgery done 5/18/21. Tumor measured 3.5cm, T3 lesion, Grade 2 with vascular invasion, 7/16 LNs positive, YURIY.  Initial CT C/A/P w/o evidence for any distant metastatic disease.  Per NCCN guidelines, adjuvant treatment recommended with 6 months of FOLFOX chemotherapy. 6 months recommended as opposed to 3 months due  to N2 disease, higher risk.  Discussed rationale for adjuvant treatment to decrease risk of recurrence and improve survival. Estimated OS around 75% with treatment.  Received IV Injectafer completed 6/18/21 for iron deficiency anemia.     Patient started cycle 1 FOLFOX on 6/16/21. Multiple dose reductions and delays due to thrombocytopenia.  Started on Nplate weekly 10/6/21 for ITP which helped with the continuation of her chemotherapy.  Completed cycle 12 FOLFOX on 12/10/21.  Colonoscopy from 5/9/22 all good. Repeat needed in 3 years.   CT C/A/P from 12/16/22 with YOANNA.     Patient is doing very will without any signs or symptoms to suggest disease recurrence.  Labs recent show mild neutropenia with ANC 2060. Mentions she did have a viral illness last month. CMP with mild elevation in creatinine. Encouraged to drink more water. CEA WNL.  RTC 3 months for follow-up with repeat labs and after completing CT scans.   Plan to repeat CT in 3 months.     Continue visits every 3 months until >2 years out which will be 6/2023, and continue CT scans every 6 months X 5 years per NCCN.   All questions answered at this time.   Continue mediport flushes.         Kamron Garza NELY

## 2023-03-20 ENCOUNTER — OFFICE VISIT (OUTPATIENT)
Dept: HEMATOLOGY/ONCOLOGY | Facility: CLINIC | Age: 53
End: 2023-03-20
Payer: COMMERCIAL

## 2023-03-20 VITALS
SYSTOLIC BLOOD PRESSURE: 130 MMHG | DIASTOLIC BLOOD PRESSURE: 89 MMHG | HEIGHT: 64 IN | TEMPERATURE: 98 F | BODY MASS INDEX: 29.5 KG/M2 | HEART RATE: 70 BPM | OXYGEN SATURATION: 100 % | RESPIRATION RATE: 14 BRPM | WEIGHT: 172.81 LBS

## 2023-03-20 DIAGNOSIS — C18.7 MALIGNANT NEOPLASM OF SIGMOID COLON: Primary | ICD-10-CM

## 2023-03-20 DIAGNOSIS — B00.1 RECURRENT COLD SORES: ICD-10-CM

## 2023-03-20 PROCEDURE — 99999 PR PBB SHADOW E&M-EST. PATIENT-LVL IV: CPT | Mod: PBBFAC,,, | Performed by: NURSE PRACTITIONER

## 2023-03-20 PROCEDURE — 99999 PR PBB SHADOW E&M-EST. PATIENT-LVL IV: ICD-10-PCS | Mod: PBBFAC,,, | Performed by: NURSE PRACTITIONER

## 2023-03-20 PROCEDURE — 99214 PR OFFICE/OUTPT VISIT, EST, LEVL IV, 30-39 MIN: ICD-10-PCS | Mod: S$PBB,,, | Performed by: NURSE PRACTITIONER

## 2023-03-20 PROCEDURE — 99214 OFFICE O/P EST MOD 30 MIN: CPT | Mod: S$PBB,,, | Performed by: NURSE PRACTITIONER

## 2023-03-20 RX ORDER — VALACYCLOVIR HYDROCHLORIDE 1 G/1
TABLET, FILM COATED ORAL
Qty: 30 TABLET | Refills: 6 | Status: SHIPPED | OUTPATIENT
Start: 2023-03-20

## 2023-03-20 RX ORDER — LEVOCETIRIZINE DIHYDROCHLORIDE 5 MG/1
5 TABLET, FILM COATED ORAL NIGHTLY
COMMUNITY

## 2023-03-20 RX ORDER — VITAMIN B COMPLEX
1 CAPSULE ORAL DAILY
COMMUNITY

## 2023-06-15 ENCOUNTER — INFUSION (OUTPATIENT)
Dept: INFUSION THERAPY | Facility: HOSPITAL | Age: 53
End: 2023-06-15
Attending: INTERNAL MEDICINE
Payer: COMMERCIAL

## 2023-06-15 ENCOUNTER — LAB VISIT (OUTPATIENT)
Dept: LAB | Facility: HOSPITAL | Age: 53
End: 2023-06-15
Attending: INTERNAL MEDICINE
Payer: COMMERCIAL

## 2023-06-15 VITALS
TEMPERATURE: 98 F | SYSTOLIC BLOOD PRESSURE: 121 MMHG | DIASTOLIC BLOOD PRESSURE: 76 MMHG | HEART RATE: 69 BPM | OXYGEN SATURATION: 96 % | RESPIRATION RATE: 20 BRPM

## 2023-06-15 DIAGNOSIS — C18.7 MALIGNANT NEOPLASM OF SIGMOID COLON: Primary | ICD-10-CM

## 2023-06-15 DIAGNOSIS — C18.7 MALIGNANT NEOPLASM OF SIGMOID COLON: ICD-10-CM

## 2023-06-15 LAB
ALBUMIN SERPL-MCNC: 3.9 G/DL (ref 3.5–5)
ALBUMIN/GLOB SERPL: 1.3 RATIO (ref 1.1–2)
ALP SERPL-CCNC: 65 UNIT/L (ref 40–150)
ALT SERPL-CCNC: 22 UNIT/L (ref 0–55)
AST SERPL-CCNC: 20 UNIT/L (ref 5–34)
BASOPHILS # BLD AUTO: 0.02 X10(3)/MCL
BASOPHILS NFR BLD AUTO: 0.4 %
BILIRUBIN DIRECT+TOT PNL SERPL-MCNC: 0.3 MG/DL
BUN SERPL-MCNC: 14.9 MG/DL (ref 9.8–20.1)
CALCIUM SERPL-MCNC: 9.1 MG/DL (ref 8.4–10.2)
CEA SERPL-MCNC: <1.73 NG/ML (ref 0–3)
CHLORIDE SERPL-SCNC: 107 MMOL/L (ref 98–107)
CO2 SERPL-SCNC: 25 MMOL/L (ref 22–29)
CREAT SERPL-MCNC: 0.86 MG/DL (ref 0.55–1.02)
EOSINOPHIL # BLD AUTO: 0.04 X10(3)/MCL (ref 0–0.9)
EOSINOPHIL NFR BLD AUTO: 0.8 %
ERYTHROCYTE [DISTWIDTH] IN BLOOD BY AUTOMATED COUNT: 12.6 % (ref 11.5–17)
GFR SERPLBLD CREATININE-BSD FMLA CKD-EPI: >60 MLS/MIN/1.73/M2
GLOBULIN SER-MCNC: 2.9 GM/DL (ref 2.4–3.5)
GLUCOSE SERPL-MCNC: 74 MG/DL (ref 74–100)
HCT VFR BLD AUTO: 38.1 % (ref 37–47)
HGB BLD-MCNC: 12.3 G/DL (ref 12–16)
IMM GRANULOCYTES # BLD AUTO: 0.02 X10(3)/MCL (ref 0–0.04)
IMM GRANULOCYTES NFR BLD AUTO: 0.4 %
LYMPHOCYTES # BLD AUTO: 2.27 X10(3)/MCL (ref 0.6–4.6)
LYMPHOCYTES NFR BLD AUTO: 43.2 %
MCH RBC QN AUTO: 30.1 PG (ref 27–31)
MCHC RBC AUTO-ENTMCNC: 32.3 G/DL (ref 33–36)
MCV RBC AUTO: 93.4 FL (ref 80–94)
MONOCYTES # BLD AUTO: 0.53 X10(3)/MCL (ref 0.1–1.3)
MONOCYTES NFR BLD AUTO: 10.1 %
NEUTROPHILS # BLD AUTO: 2.38 X10(3)/MCL (ref 2.1–9.2)
NEUTROPHILS NFR BLD AUTO: 45.1 %
PLATELET # BLD AUTO: 187 X10(3)/MCL (ref 130–400)
PMV BLD AUTO: 9.5 FL (ref 7.4–10.4)
POTASSIUM SERPL-SCNC: 4.2 MMOL/L (ref 3.5–5.1)
PROT SERPL-MCNC: 6.8 GM/DL (ref 6.4–8.3)
RBC # BLD AUTO: 4.08 X10(6)/MCL (ref 4.2–5.4)
SODIUM SERPL-SCNC: 140 MMOL/L (ref 136–145)
WBC # SPEC AUTO: 5.26 X10(3)/MCL (ref 4.5–11.5)

## 2023-06-15 PROCEDURE — 82378 CARCINOEMBRYONIC ANTIGEN: CPT

## 2023-06-15 PROCEDURE — A4216 STERILE WATER/SALINE, 10 ML: HCPCS | Performed by: INTERNAL MEDICINE

## 2023-06-15 PROCEDURE — 36415 COLL VENOUS BLD VENIPUNCTURE: CPT

## 2023-06-15 PROCEDURE — 63600175 PHARM REV CODE 636 W HCPCS: Performed by: INTERNAL MEDICINE

## 2023-06-15 PROCEDURE — 80053 COMPREHEN METABOLIC PANEL: CPT

## 2023-06-15 PROCEDURE — 25000003 PHARM REV CODE 250: Performed by: INTERNAL MEDICINE

## 2023-06-15 PROCEDURE — 96523 IRRIG DRUG DELIVERY DEVICE: CPT

## 2023-06-15 PROCEDURE — 85025 COMPLETE CBC W/AUTO DIFF WBC: CPT

## 2023-06-15 RX ORDER — HEPARIN 100 UNIT/ML
500 SYRINGE INTRAVENOUS
Status: DISCONTINUED | OUTPATIENT
Start: 2023-06-15 | End: 2023-06-15 | Stop reason: HOSPADM

## 2023-06-15 RX ORDER — SODIUM CHLORIDE 0.9 % (FLUSH) 0.9 %
10 SYRINGE (ML) INJECTION
Status: DISCONTINUED | OUTPATIENT
Start: 2023-06-15 | End: 2023-06-15 | Stop reason: HOSPADM

## 2023-06-15 RX ORDER — HEPARIN 100 UNIT/ML
500 SYRINGE INTRAVENOUS
Status: CANCELLED | OUTPATIENT
Start: 2023-08-31

## 2023-06-15 RX ORDER — SODIUM CHLORIDE 0.9 % (FLUSH) 0.9 %
10 SYRINGE (ML) INJECTION
Status: CANCELLED | OUTPATIENT
Start: 2023-08-31

## 2023-06-15 RX ADMIN — HEPARIN 500 UNITS: 100 SYRINGE at 02:06

## 2023-06-15 RX ADMIN — Medication 10 ML: at 02:06

## 2023-06-15 NOTE — PLAN OF CARE
Mediport flush q12w completed without difficulty. Tolerated well. Next appt made and given to pt. Dc'd home in stable condition.

## 2023-06-15 NOTE — PROGRESS NOTES
Subjective:       Patient ID: Melissa Jones is a 52 y.o. female.    Surgeon: Dr. Cruz Farmer  GI: Dr. Enrique Comer     1. Sigmoid Colon Cancer--Stage IIIC ([id number])--Diagnosed 21  Biopsy/pathology:  Colonoscopy done 21--circumferential, ulcerated, sigmoid colon mass at 30cm, biopsy positive for moderately differentiated invasive adenocarcinoma, MMR intact(YURIY), scope did traverse the lesion with some maneuvering, internal hemorrhoids noted, normal mucosa in terminal ileum, otherwise normal examination.  Surgery/pathology:  Laparoscopic sigmoid colectomy done 21--adenocarcinoma, moderately differentiated, measuring 3.5cm with invasion through the muscularis propria into pericolic adipose tissue, margins clear,  regional lymph nodes positive for metastatic adenocarcinoma, focal discontinuous extramural tumor extension c/w vascular invasion (pT3N2b).  Imagin. CT A/P w/ contrast done 21--very mild stranding along a short segment of sigmoid colon may correspond with the primary malignancy, no metastatic disease.   2. CT chest done 21--No acute abnormality. No CT evidence of metastatic disease.  3. CT C/A/P done 21--YOANNA.   4. CT C/A/P done 22--YOANNA.   5. CT C/A/P done 22--YOANNA.  6. CT C/A/P done 23--YOANNA, 25mm left ovarian cyst.     2. Iron Deficiency Anemia--21     3. Idiopathic Thrombocytopenia Purpura - 21  Positive for HLA Class 1A and Anti IIa/IIIa antibodies on 21.      Procedures:   1. EGD done 21--erythema in antrum, c/w gastritis biopsy showing chronic gastritis with focal mild activity, H. pylori negative, normal mucosa in duodenum, normal esophagus and GE junction, moderate amount of bile in stomach.  2. Left subclavian mediport placed 6/15/21.  3. Colonoscopy 22--Dr. Enrique Comer showed colonic anastomosis at 15cm, healthy, internal hemorrhoids, normal mucosa in terminal ileum, repeat recommended in 3 years.       CEA:  05/18/21--3.01  06/10/21--<1.73  12/30/21--2.52  03/21/22--<1.73  06/17/22--<1.73  12/20/22--<1.73  03/13/23--<1.73  06/15/23--<1.73     Treatment history:  1. Laparoscopic sigmoid colectomy 5/18/21.  2. IV Injectafer 6/11/21 and 6/18/21.   3. FOLFOX X 12 cycles 6/16/21--12/10/21 (multiple dose reductions and delays for thrombocytopenia).  4. Nplate treatment for ITP 10/6/21--12/1/21.     Treatment plan:  Observation     Chief Complaint: No chief complaint on file.    HPI   Patient presents for follow-up of colon cancer accompanied by her mother. She is doing very well. Appetite good and weight stable. Bowels are moving well. Mentions she had a viral illness that lasted 3 weeks last month. She is now feeling better. Denies any new complaints.    Past Medical History:   Diagnosis Date    Cervical spondylosis     Colon cancer     GERD (gastroesophageal reflux disease)     MELIDA (iron deficiency anemia)       Review of patient's allergies indicates:  No Known Allergies   Current Outpatient Medications on File Prior to Visit   Medication Sig Dispense Refill    albuterol (PROAIR HFA) 90 mcg/actuation inhaler Inhale 2 puffs into the lungs every 6 (six) hours as needed for Wheezing. Rescue (Patient not taking: Reported on 3/20/2023) 18 g 0    b complex vitamins capsule Take 1 capsule by mouth once daily.      levocetirizine (XYZAL) 5 MG tablet Take 5 mg by mouth every evening.      montelukast (SINGULAIR) 10 mg tablet Take 10 mg by mouth.      valACYclovir (VALTREX) 1000 MG tablet Take 2 tabs (2,000 mg) every 12 hours x 1 day at the onset of cold sore symptoms. 30 tablet 6     No current facility-administered medications on file prior to visit.      Review of Systems   Constitutional:  Negative for appetite change, fatigue, fever and unexpected weight change.   HENT:  Negative for mouth sores.    Eyes: Negative.  Negative for visual disturbance.   Respiratory:  Negative for cough and shortness of breath.     Cardiovascular:  Negative for chest pain and leg swelling.   Gastrointestinal:  Negative for abdominal distention, abdominal pain, constipation, diarrhea, nausea, vomiting and reflux.   Genitourinary:  Negative for difficulty urinating, dysuria, frequency and hematuria.   Musculoskeletal:  Negative for arthralgias and back pain.   Integumentary:  Negative for rash.   Neurological:  Negative for weakness and headaches.   Hematological:  Negative for adenopathy.   Psychiatric/Behavioral:  Negative for sleep disturbance. The patient is not nervous/anxious.        There were no vitals filed for this visit.    Physical Exam  Constitutional:       Appearance: Normal appearance. She is normal weight.   HENT:      Head: Normocephalic.      Nose: Nose normal.      Mouth/Throat:      Mouth: Mucous membranes are moist.   Eyes:      General: Lids are normal. Vision grossly intact.      Extraocular Movements: Extraocular movements intact.      Conjunctiva/sclera: Conjunctivae normal.   Cardiovascular:      Rate and Rhythm: Normal rate and regular rhythm.      Heart sounds: Normal heart sounds.   Pulmonary:      Effort: Pulmonary effort is normal.      Breath sounds: Normal breath sounds.   Chest:      Comments: Left CW mediport intact  Abdominal:      General: Abdomen is flat. Bowel sounds are normal. There is no distension.      Palpations: Abdomen is soft.      Tenderness: There is no abdominal tenderness.      Comments: Scattered laparoscopic incisions healed   Musculoskeletal:         General: Normal range of motion.      Cervical back: Normal range of motion and neck supple.      Right lower leg: No edema.      Left lower leg: No edema.   Skin:     General: Skin is warm and moist.   Neurological:      General: No focal deficit present.      Mental Status: She is alert and oriented to person, place, and time.   Psychiatric:         Attention and Perception: Attention normal.         Mood and Affect: Mood normal.          Speech: Speech normal.         Behavior: Behavior is cooperative.         Cognition and Memory: Cognition normal.         Judgment: Judgment normal.       Lab Visit on 06/15/2023   Component Date Value    Carcinoembryonic Antigen 06/15/2023 <1.73     Sodium Level 06/15/2023 140     Potassium Level 06/15/2023 4.2     Chloride 06/15/2023 107     Carbon Dioxide 06/15/2023 25     Glucose Level 06/15/2023 74     Blood Urea Nitrogen 06/15/2023 14.9     Creatinine 06/15/2023 0.86     Calcium Level Total 06/15/2023 9.1     Protein Total 06/15/2023 6.8     Albumin Level 06/15/2023 3.9     Globulin 06/15/2023 2.9     Albumin/Globulin Ratio 06/15/2023 1.3     Bilirubin Total 06/15/2023 0.3     Alkaline Phosphatase 06/15/2023 65     Alanine Aminotransferase 06/15/2023 22     Aspartate Aminotransfera* 06/15/2023 20     eGFR 06/15/2023 >60     WBC 06/15/2023 5.26     RBC 06/15/2023 4.08 (L)     Hgb 06/15/2023 12.3     Hct 06/15/2023 38.1     MCV 06/15/2023 93.4     MCH 06/15/2023 30.1     MCHC 06/15/2023 32.3 (L)     RDW 06/15/2023 12.6     Platelet 06/15/2023 187     MPV 06/15/2023 9.5     Neut % 06/15/2023 45.1     Lymph % 06/15/2023 43.2     Mono % 06/15/2023 10.1     Eos % 06/15/2023 0.8     Basophil % 06/15/2023 0.4     Lymph # 06/15/2023 2.27     Neut # 06/15/2023 2.38     Mono # 06/15/2023 0.53     Eos # 06/15/2023 0.04     Baso # 06/15/2023 0.02     IG# 06/15/2023 0.02     IG% 06/15/2023 0.4       ECOG SCORE            Assessment:       1. Malignant neoplasm of sigmoid colon      Plan:       Patient with Stage IIIC Sigmoid colon cancer s/p surgery done 5/18/21. Tumor measured 3.5cm, T3 lesion, Grade 2 with vascular invasion, 7/16 LNs positive, YURIY.  Initial CT C/A/P w/o evidence for any distant metastatic disease.  Per NCCN guidelines, adjuvant treatment recommended with 6 months of FOLFOX chemotherapy. 6 months recommended as opposed to 3 months due to N2 disease, higher risk.  Discussed rationale for adjuvant treatment  to decrease risk of recurrence and improve survival. Estimated OS around 75% with treatment.  Received IV Injectafer completed 6/18/21 for iron deficiency anemia.     Patient started cycle 1 FOLFOX on 6/16/21. Multiple dose reductions and delays due to thrombocytopenia.  Started on Nplate weekly 10/6/21 for ITP which helped with the continuation of her chemotherapy.  Completed cycle 12 FOLFOX on 12/10/21.    Colonoscopy from 5/9/22 all good. Repeat needed in 3 years.   CT C/A/P from 6/16/23 with YOANNA, but does have a 25mm left ovarian cyst.  Refer to GYN Dr. Jha for evaluation of left ovarian cyst, post-menopausal.    Patient is doing very will without any signs or symptoms to suggest disease recurrence.  Labs recent all normal. CEA WNL.   Patient is now >2 years out from surgery.  Will change visits to every 6 months and continue with CT scans every 6 months X 5 years per NCCN.     Refer to Dr. Farmer for mediport removal.    All questions answered at this time.   Continue mediport flushes.         Belén Underwood MD

## 2023-06-19 ENCOUNTER — HOSPITAL ENCOUNTER (OUTPATIENT)
Dept: RADIOLOGY | Facility: HOSPITAL | Age: 53
Discharge: HOME | End: 2023-06-19
Attending: NURSE PRACTITIONER
Payer: COMMERCIAL

## 2023-06-19 DIAGNOSIS — C18.7 MALIGNANT NEOPLASM OF SIGMOID COLON: ICD-10-CM

## 2023-06-19 PROCEDURE — 74177 CT ABD & PELVIS W/CONTRAST: CPT | Mod: TC

## 2023-06-19 PROCEDURE — 25500020 PHARM REV CODE 255: Performed by: NURSE PRACTITIONER

## 2023-06-19 PROCEDURE — 71260 CT THORAX DX C+: CPT | Mod: TC

## 2023-06-19 RX ADMIN — IOPAMIDOL 100 ML: 755 INJECTION, SOLUTION INTRAVENOUS at 01:06

## 2023-06-19 RX ADMIN — DIATRIZOATE MEGLUMINE AND DIATRIZOATE SODIUM 30 ML: 660; 100 LIQUID ORAL; RECTAL at 12:06

## 2023-06-22 ENCOUNTER — PATIENT MESSAGE (OUTPATIENT)
Dept: HEMATOLOGY/ONCOLOGY | Facility: CLINIC | Age: 53
End: 2023-06-22

## 2023-06-22 ENCOUNTER — OFFICE VISIT (OUTPATIENT)
Dept: HEMATOLOGY/ONCOLOGY | Facility: CLINIC | Age: 53
End: 2023-06-22
Payer: COMMERCIAL

## 2023-06-22 VITALS
WEIGHT: 173.81 LBS | OXYGEN SATURATION: 98 % | DIASTOLIC BLOOD PRESSURE: 82 MMHG | BODY MASS INDEX: 29.67 KG/M2 | HEART RATE: 71 BPM | RESPIRATION RATE: 14 BRPM | SYSTOLIC BLOOD PRESSURE: 128 MMHG | HEIGHT: 64 IN | TEMPERATURE: 98 F

## 2023-06-22 DIAGNOSIS — C18.7 MALIGNANT NEOPLASM OF SIGMOID COLON: Primary | ICD-10-CM

## 2023-06-22 PROCEDURE — 3079F DIAST BP 80-89 MM HG: CPT | Mod: CPTII,S$GLB,, | Performed by: INTERNAL MEDICINE

## 2023-06-22 PROCEDURE — 99214 OFFICE O/P EST MOD 30 MIN: CPT | Mod: S$GLB,,, | Performed by: INTERNAL MEDICINE

## 2023-06-22 PROCEDURE — 99999 PR PBB SHADOW E&M-EST. PATIENT-LVL V: CPT | Mod: PBBFAC,,, | Performed by: INTERNAL MEDICINE

## 2023-06-22 PROCEDURE — 3008F PR BODY MASS INDEX (BMI) DOCUMENTED: ICD-10-PCS | Mod: CPTII,S$GLB,, | Performed by: INTERNAL MEDICINE

## 2023-06-22 PROCEDURE — 3074F SYST BP LT 130 MM HG: CPT | Mod: CPTII,S$GLB,, | Performed by: INTERNAL MEDICINE

## 2023-06-22 PROCEDURE — 1159F PR MEDICATION LIST DOCUMENTED IN MEDICAL RECORD: ICD-10-PCS | Mod: CPTII,S$GLB,, | Performed by: INTERNAL MEDICINE

## 2023-06-22 PROCEDURE — 99214 PR OFFICE/OUTPT VISIT, EST, LEVL IV, 30-39 MIN: ICD-10-PCS | Mod: S$GLB,,, | Performed by: INTERNAL MEDICINE

## 2023-06-22 PROCEDURE — 3008F BODY MASS INDEX DOCD: CPT | Mod: CPTII,S$GLB,, | Performed by: INTERNAL MEDICINE

## 2023-06-22 PROCEDURE — 1159F MED LIST DOCD IN RCRD: CPT | Mod: CPTII,S$GLB,, | Performed by: INTERNAL MEDICINE

## 2023-06-22 PROCEDURE — 99999 PR PBB SHADOW E&M-EST. PATIENT-LVL V: ICD-10-PCS | Mod: PBBFAC,,, | Performed by: INTERNAL MEDICINE

## 2023-06-22 PROCEDURE — 3074F PR MOST RECENT SYSTOLIC BLOOD PRESSURE < 130 MM HG: ICD-10-PCS | Mod: CPTII,S$GLB,, | Performed by: INTERNAL MEDICINE

## 2023-06-22 PROCEDURE — 3079F PR MOST RECENT DIASTOLIC BLOOD PRESSURE 80-89 MM HG: ICD-10-PCS | Mod: CPTII,S$GLB,, | Performed by: INTERNAL MEDICINE

## 2023-07-20 ENCOUNTER — OFFICE VISIT (OUTPATIENT)
Dept: SURGICAL ONCOLOGY | Facility: CLINIC | Age: 53
End: 2023-07-20
Payer: COMMERCIAL

## 2023-07-20 VITALS
TEMPERATURE: 98 F | OXYGEN SATURATION: 96 % | DIASTOLIC BLOOD PRESSURE: 85 MMHG | HEART RATE: 75 BPM | HEIGHT: 63 IN | SYSTOLIC BLOOD PRESSURE: 137 MMHG | WEIGHT: 171.63 LBS | BODY MASS INDEX: 30.41 KG/M2

## 2023-07-20 DIAGNOSIS — Z95.828 PORT-A-CATH IN PLACE: Primary | ICD-10-CM

## 2023-07-20 DIAGNOSIS — C18.7 MALIGNANT NEOPLASM OF SIGMOID COLON: ICD-10-CM

## 2023-07-20 PROCEDURE — 1160F RVW MEDS BY RX/DR IN RCRD: CPT | Mod: CPTII,S$GLB,, | Performed by: COLON & RECTAL SURGERY

## 2023-07-20 PROCEDURE — 36590 PR REMOVAL TUNNELED CV CATH W SUBQ PORT OR PUMP: ICD-10-PCS | Mod: S$GLB,,, | Performed by: COLON & RECTAL SURGERY

## 2023-07-20 PROCEDURE — 99214 OFFICE O/P EST MOD 30 MIN: CPT | Mod: 25,S$GLB,, | Performed by: COLON & RECTAL SURGERY

## 2023-07-20 PROCEDURE — 1160F PR REVIEW ALL MEDS BY PRESCRIBER/CLIN PHARMACIST DOCUMENTED: ICD-10-PCS | Mod: CPTII,S$GLB,, | Performed by: COLON & RECTAL SURGERY

## 2023-07-20 PROCEDURE — 3075F PR MOST RECENT SYSTOLIC BLOOD PRESS GE 130-139MM HG: ICD-10-PCS | Mod: CPTII,S$GLB,, | Performed by: COLON & RECTAL SURGERY

## 2023-07-20 PROCEDURE — 99999 PR PBB SHADOW E&M-EST. PATIENT-LVL III: CPT | Mod: PBBFAC,,, | Performed by: COLON & RECTAL SURGERY

## 2023-07-20 PROCEDURE — 36590 REMOVAL TUNNELED CV CATH: CPT | Mod: S$GLB,,, | Performed by: COLON & RECTAL SURGERY

## 2023-07-20 PROCEDURE — 3008F PR BODY MASS INDEX (BMI) DOCUMENTED: ICD-10-PCS | Mod: CPTII,S$GLB,, | Performed by: COLON & RECTAL SURGERY

## 2023-07-20 PROCEDURE — 3008F BODY MASS INDEX DOCD: CPT | Mod: CPTII,S$GLB,, | Performed by: COLON & RECTAL SURGERY

## 2023-07-20 PROCEDURE — 3079F DIAST BP 80-89 MM HG: CPT | Mod: CPTII,S$GLB,, | Performed by: COLON & RECTAL SURGERY

## 2023-07-20 PROCEDURE — 99214 PR OFFICE/OUTPT VISIT, EST, LEVL IV, 30-39 MIN: ICD-10-PCS | Mod: 25,S$GLB,, | Performed by: COLON & RECTAL SURGERY

## 2023-07-20 PROCEDURE — 3075F SYST BP GE 130 - 139MM HG: CPT | Mod: CPTII,S$GLB,, | Performed by: COLON & RECTAL SURGERY

## 2023-07-20 PROCEDURE — 3079F PR MOST RECENT DIASTOLIC BLOOD PRESSURE 80-89 MM HG: ICD-10-PCS | Mod: CPTII,S$GLB,, | Performed by: COLON & RECTAL SURGERY

## 2023-07-20 PROCEDURE — 1159F PR MEDICATION LIST DOCUMENTED IN MEDICAL RECORD: ICD-10-PCS | Mod: CPTII,S$GLB,, | Performed by: COLON & RECTAL SURGERY

## 2023-07-20 PROCEDURE — 99999 PR PBB SHADOW E&M-EST. PATIENT-LVL III: ICD-10-PCS | Mod: PBBFAC,,, | Performed by: COLON & RECTAL SURGERY

## 2023-07-20 PROCEDURE — 1159F MED LIST DOCD IN RCRD: CPT | Mod: CPTII,S$GLB,, | Performed by: COLON & RECTAL SURGERY

## 2023-07-20 NOTE — PROGRESS NOTES
"   Patient ID: [id number]     Chief Complaint: No chief complaint on file.      HPI:     Melissa Jones is a 53 y.o. female here today for for MediPort removal.  She is status post laparoscopic sigmoid colectomy 05/18/2021 for a moderately differentiated 3.5 cm adenocarcinoma with 7/16 lymph nodes positive and focal discontinuous extramural tumor extension.  She completed 12 cycles of FOLFOX adjuvant therapy on 12/10/2021.  Her last colonoscopy 05/09/2022 with Dr. Enrique Comer was unremarkable.  Her last surveillance CT scans on 06/19/2023 showed no evidence of disease.  Today she states she is doing well.  She is tolerating a diet and having regular bowel function.  She denies any abdominal pain or weight loss.    Current Outpatient Medications   Medication Instructions    albuterol (PROAIR HFA) 90 mcg/actuation inhaler 2 puffs, Inhalation, Every 6 hours PRN, Rescue    b complex vitamins capsule 1 capsule, Oral, Daily    levocetirizine (XYZAL) 5 mg, Oral, Nightly    montelukast (SINGULAIR) 10 mg, Oral    valACYclovir (VALTREX) 1000 MG tablet Take 2 tabs (2,000 mg) every 12 hours x 1 day at the onset of cold sore symptoms.       Patient has No Known Allergies.     Patient Care Team:  Primary Doctor No as PCP - General       Subjective:     Review of Systems    12 point review of systems conducted, negative except as stated in the history of present illness. See HPI for details.      Objective:     Visit Vitals  /85 (BP Location: Left arm)   Pulse 75   Temp 98 °F (36.7 °C)   Ht 5' 3" (1.6 m)   Wt 77.8 kg (171 lb 9.6 oz)   SpO2 96%   BMI 30.40 kg/m²       Physical Exam    General: Alert and oriented, No acute distress.  Head: Normocephalic, Atraumatic.  Eye: Sclera non-icteric.  Respiratory: Non-labored respirations, Symmetrical chest wall expansion, Left sided Mediport in place.  Cardiovascular: Regular rate.  Gastrointestinal: Soft, Non-tender, Non-distended, Normal bowel sounds.  Integumentary: Warm, " Dry, Intact, No rashes.  Extremities: No edema.  Neurologic: No focal deficits.      Labs Reviewed:     Chemistry:  Lab Results   Component Value Date     06/15/2023    K 4.2 06/15/2023    CHLORIDE 107 06/15/2023    BUN 14.9 06/15/2023    CREATININE 0.86 06/15/2023    EGFRNORACEVR >60 06/15/2023    GLUCOSE 74 06/15/2023    CALCIUM 9.1 06/15/2023    ALKPHOS 65 06/15/2023    LABPROT 6.8 06/15/2023    ALBUMIN 3.9 06/15/2023    BILIDIR 0.1 03/21/2022    IBILI 0.20 03/21/2022    AST 20 06/15/2023    ALT 22 06/15/2023        Hematology:  Lab Results   Component Value Date    WBC 5.26 06/15/2023    HGB 12.3 06/15/2023    HCT 38.1 06/15/2023     06/15/2023         Assessment:       ICD-10-CM ICD-9-CM   1. Port-A-Cath in place  Z95.828 V45.89   2. Malignant neoplasm of sigmoid colon  C18.7 153.3        Plan:     1. Port-A-Cath in place  Remove in office today    2. Malignant neoplasm of sigmoid colon  Continue surveillance with Dr. Underwood         No follow-ups on file. In addition to their scheduled follow up, the patient has also been instructed to follow up on as needed basis.     Future Appointments   Date Time Provider Department Center   9/11/2023  8:00 AM INJECTION CHAIR 02, [facility]   12/12/2023  3:00 PM LAB, [facility] LAB [facility]   12/12/2023  3:30 PM INJECTION CHAIR 01, [facility]   12/15/2023  9[facility]00 [facility]   3/5/2024  9:00 AM INJECTION CHAIR 01, [facility] CHEMOTHERAPY INFUSION [facility]        Cruz Farmer MD

## 2023-07-20 NOTE — PROGRESS NOTES
[facility] Surgical Oncology  Operative Note      Date of Procedure:  07/20/2023    Procedure: MediPort removal     Pre-Operative Diagnosis: Mediport in place    Post-Operative Diagnosis: Same    Anesthesia: Lidocaine 1% with epinephrine    Estimated Blood Loss (EBL): Less than 10 mL           Specimens: None     Description of Technical Procedures:  After informed consent was obtained, patient was placed in the supine position.  The left chest wall was prepped and draped in sterile surgical fashion.  Lidocaine 1% with epinephrine was used for local anesthesia.  A linear skin incision was made with a 15 blade through the previous scar.  The capsule was then incised sharply and the MediPort was removed intact.  The catheter tract was ligated with a figure-of-eight Vicryl suture.  Hemostasis was ensured.  The skin edges were reapproximated with running 4-0 Vicryl suture in a subcuticular fashion.  Incision was cleaned and sterile dressing was applied.  She tolerated the procedure well and there were no complications.

## 2023-12-05 ENCOUNTER — PATIENT MESSAGE (OUTPATIENT)
Dept: HEMATOLOGY/ONCOLOGY | Facility: CLINIC | Age: 53
End: 2023-12-05

## 2023-12-05 DIAGNOSIS — C18.7 MALIGNANT NEOPLASM OF SIGMOID COLON: Primary | ICD-10-CM

## 2023-12-05 NOTE — TELEPHONE ENCOUNTER
Yes the note does say to for CT scans every 6 months which would be December. Can you order the CT scans? I will send this message to our schedulers to see if they can get this done before her appointment. Thanks!

## 2023-12-08 ENCOUNTER — HOSPITAL ENCOUNTER (OUTPATIENT)
Dept: RADIOLOGY | Facility: HOSPITAL | Age: 53
Discharge: HOME | End: 2023-12-08
Attending: INTERNAL MEDICINE
Payer: COMMERCIAL

## 2023-12-08 DIAGNOSIS — C18.7 MALIGNANT NEOPLASM OF SIGMOID COLON: ICD-10-CM

## 2023-12-08 PROCEDURE — 71260 CT THORAX DX C+: CPT | Mod: TC

## 2023-12-08 PROCEDURE — 74177 CT ABD & PELVIS W/CONTRAST: CPT | Mod: TC

## 2023-12-08 PROCEDURE — 25500020 PHARM REV CODE 255: Performed by: INTERNAL MEDICINE

## 2023-12-08 RX ADMIN — IOPAMIDOL 100 ML: 755 INJECTION, SOLUTION INTRAVENOUS at 04:12

## 2023-12-08 RX ADMIN — DIATRIZOATE MEGLUMINE AND DIATRIZOATE SODIUM 30 ML: 660; 100 LIQUID ORAL; RECTAL at 03:12

## 2023-12-11 PROBLEM — C77.2 SECONDARY AND UNSPECIFIED MALIGNANT NEOPLASM OF INTRA-ABDOMINAL LYMPH NODES: Status: ACTIVE | Noted: 2023-12-11

## 2023-12-11 PROBLEM — D70.9 NEUTROPENIA, UNSPECIFIED TYPE: Status: ACTIVE | Noted: 2023-12-11

## 2023-12-11 PROBLEM — D69.6 THROMBOCYTOPENIA, UNSPECIFIED: Status: ACTIVE | Noted: 2023-12-11

## 2023-12-12 ENCOUNTER — LAB VISIT (OUTPATIENT)
Dept: LAB | Facility: HOSPITAL | Age: 53
End: 2023-12-12
Attending: INTERNAL MEDICINE
Payer: COMMERCIAL

## 2023-12-12 DIAGNOSIS — C18.7 MALIGNANT NEOPLASM OF SIGMOID COLON: ICD-10-CM

## 2023-12-12 LAB
ALBUMIN SERPL-MCNC: 4.2 G/DL (ref 3.5–5)
ALBUMIN/GLOB SERPL: 1.4 RATIO (ref 1.1–2)
ALP SERPL-CCNC: 60 UNIT/L (ref 40–150)
ALT SERPL-CCNC: 24 UNIT/L (ref 0–55)
AST SERPL-CCNC: 24 UNIT/L (ref 5–34)
BASOPHILS # BLD AUTO: 0.02 X10(3)/MCL
BASOPHILS NFR BLD AUTO: 0.5 %
BILIRUB SERPL-MCNC: 0.4 MG/DL
BUN SERPL-MCNC: 16.5 MG/DL (ref 9.8–20.1)
CALCIUM SERPL-MCNC: 9.1 MG/DL (ref 8.4–10.2)
CEA SERPL-MCNC: <1.73 NG/ML (ref 0–3)
CHLORIDE SERPL-SCNC: 111 MMOL/L (ref 98–107)
CO2 SERPL-SCNC: 24 MMOL/L (ref 22–29)
CREAT SERPL-MCNC: 0.93 MG/DL (ref 0.55–1.02)
EOSINOPHIL # BLD AUTO: 0.04 X10(3)/MCL (ref 0–0.9)
EOSINOPHIL NFR BLD AUTO: 0.9 %
ERYTHROCYTE [DISTWIDTH] IN BLOOD BY AUTOMATED COUNT: 12.2 % (ref 11.5–17)
GFR SERPLBLD CREATININE-BSD FMLA CKD-EPI: >60 MLS/MIN/1.73/M2
GLOBULIN SER-MCNC: 2.9 GM/DL (ref 2.4–3.5)
GLUCOSE SERPL-MCNC: 70 MG/DL (ref 74–100)
HCT VFR BLD AUTO: 37 % (ref 37–47)
HGB BLD-MCNC: 12.4 G/DL (ref 12–16)
IMM GRANULOCYTES # BLD AUTO: 0.01 X10(3)/MCL (ref 0–0.04)
IMM GRANULOCYTES NFR BLD AUTO: 0.2 %
LYMPHOCYTES # BLD AUTO: 1.99 X10(3)/MCL (ref 0.6–4.6)
LYMPHOCYTES NFR BLD AUTO: 46.1 %
MCH RBC QN AUTO: 30.1 PG (ref 27–31)
MCHC RBC AUTO-ENTMCNC: 33.5 G/DL (ref 33–36)
MCV RBC AUTO: 89.8 FL (ref 80–94)
MONOCYTES # BLD AUTO: 0.36 X10(3)/MCL (ref 0.1–1.3)
MONOCYTES NFR BLD AUTO: 8.3 %
NEUTROPHILS # BLD AUTO: 1.9 X10(3)/MCL (ref 2.1–9.2)
NEUTROPHILS NFR BLD AUTO: 44 %
PLATELET # BLD AUTO: 156 X10(3)/MCL (ref 130–400)
PMV BLD AUTO: 9.5 FL (ref 7.4–10.4)
POTASSIUM SERPL-SCNC: 4 MMOL/L (ref 3.5–5.1)
PROT SERPL-MCNC: 7.1 GM/DL (ref 6.4–8.3)
RBC # BLD AUTO: 4.12 X10(6)/MCL (ref 4.2–5.4)
SODIUM SERPL-SCNC: 141 MMOL/L (ref 136–145)
WBC # SPEC AUTO: 4.32 X10(3)/MCL (ref 4.5–11.5)

## 2023-12-12 PROCEDURE — 80053 COMPREHEN METABOLIC PANEL: CPT

## 2023-12-12 PROCEDURE — 36415 COLL VENOUS BLD VENIPUNCTURE: CPT

## 2023-12-12 PROCEDURE — 82378 CARCINOEMBRYONIC ANTIGEN: CPT

## 2023-12-12 PROCEDURE — 85025 COMPLETE CBC W/AUTO DIFF WBC: CPT

## 2023-12-14 NOTE — PROGRESS NOTES
Subjective:       Patient ID: Melissa Jones is a 53 y.o. female.    Surgeon: Dr. Cruz Farmer  GI: Dr. Enrique Comer     1. Sigmoid Colon Cancer--Stage IIIC ([id number])--Diagnosed 21  Biopsy/pathology:  Colonoscopy done 21--circumferential, ulcerated, sigmoid colon mass at 30cm, biopsy positive for moderately differentiated invasive adenocarcinoma, MMR intact(YURIY), scope did traverse the lesion with some maneuvering, internal hemorrhoids noted, normal mucosa in terminal ileum, otherwise normal examination.  Surgery/pathology:  Laparoscopic sigmoid colectomy done 21--adenocarcinoma, moderately differentiated, measuring 3.5cm with invasion through the muscularis propria into pericolic adipose tissue, margins clear,  regional lymph nodes positive for metastatic adenocarcinoma, focal discontinuous extramural tumor extension c/w vascular invasion (pT3N2b).  Imagin. CT A/P w/ contrast done 21--very mild stranding along a short segment of sigmoid colon may correspond with the primary malignancy, no metastatic disease.   2. CT chest done 21--No acute abnormality. No CT evidence of metastatic disease.  3. CT C/A/P done 21--YOANNA.   4. CT C/A/P done 22--YOANNA.   5. CT C/A/P done 22--YOANNA.  6. CT C/A/P done 23--YOANNA, 25mm left ovarian cyst.  7. CT C/A/P on 23--No evidence of recurrent malignancy.      2. Iron Deficiency Anemia--21     3. Idiopathic Thrombocytopenia Purpura - 21  Positive for HLA Class 1A and Anti IIa/IIIa antibodies on 21.      Procedures:   1. EGD done 21--erythema in antrum, c/w gastritis biopsy showing chronic gastritis with focal mild activity, H. pylori negative, normal mucosa in duodenum, normal esophagus and GE junction, moderate amount of bile in stomach.  2. Left subclavian mediport placed 6/15/21.  3. Colonoscopy 22--Dr. Enrique Comer showed colonic anastomosis at 15cm, healthy, internal hemorrhoids, normal  mucosa in terminal ileum, repeat recommended in 3 years.      CEA:  05/18/21--3.01  06/10/21--<1.73  12/30/21--2.52  03/21/22--<1.73  06/17/22--<1.73  12/20/22--<1.73  03/13/23--<1.73  06/15/23--<1.73  12/12/23--<1.73     Treatment history:  1. Laparoscopic sigmoid colectomy 5/18/21.  2. IV Injectafer 6/11/21 and 6/18/21.   3. FOLFOX X 12 cycles 6/16/21--12/10/21 (multiple dose reductions and delays for thrombocytopenia).  4. Nplate treatment for ITP 10/6/21--12/1/21.     Treatment plan:  Observation     Chief Complaint: Other Misc (Pt reports no concerns today.)    HPI   Patient presents for follow-up of colon cancer. She is doing very well. Appetite good and weight stable. Bowels are moving well. Denies any new complaints. Recent CT scan revealed YOANNA and she is glad to hear the news. It did not mention the left ovarian cyst seen on the last CT scan. She was never contacted by Dr. Vaughan's office. No other problems reported.     Past Medical History:   Diagnosis Date    Cervical spondylosis     Colon cancer     GERD (gastroesophageal reflux disease)     MELIDA (iron deficiency anemia)       Review of patient's allergies indicates:  No Known Allergies   Current Outpatient Medications on File Prior to Visit   Medication Sig Dispense Refill    b complex vitamins capsule Take 1 capsule by mouth once daily.      levocetirizine (XYZAL) 5 MG tablet Take 5 mg by mouth every evening.      valACYclovir (VALTREX) 1000 MG tablet Take 2 tabs (2,000 mg) every 12 hours x 1 day at the onset of cold sore symptoms. 30 tablet 6    montelukast (SINGULAIR) 10 mg tablet Take 10 mg by mouth.      [DISCONTINUED] albuterol (PROAIR HFA) 90 mcg/actuation inhaler Inhale 2 puffs into the lungs every 6 (six) hours as needed for Wheezing. Rescue (Patient not taking: Reported on 3/20/2023) 18 g 0     No current facility-administered medications on file prior to visit.      Review of Systems   Constitutional:  Negative for appetite change, fatigue,  fever and unexpected weight change.   HENT:  Negative for mouth sores.    Eyes: Negative.  Negative for visual disturbance.   Respiratory:  Negative for cough and shortness of breath.    Cardiovascular:  Negative for chest pain and leg swelling.   Gastrointestinal:  Negative for abdominal distention, abdominal pain, constipation, diarrhea, nausea, vomiting and reflux.   Genitourinary:  Negative for difficulty urinating, dysuria, frequency and hematuria.   Musculoskeletal:  Negative for arthralgias and back pain.   Integumentary:  Negative for rash.   Neurological:  Negative for weakness and headaches.   Hematological:  Negative for adenopathy.   Psychiatric/Behavioral:  Negative for sleep disturbance. The patient is not nervous/anxious.          Vitals:    12/15/23 0925   BP: 113/85   Pulse: 91   Resp: 14   Temp: 97.7 °F (36.5 °C)     Physical Exam  Constitutional:       Appearance: Normal appearance. She is normal weight.   HENT:      Head: Normocephalic.      Nose: Nose normal.      Mouth/Throat:      Mouth: Mucous membranes are moist.   Eyes:      General: Lids are normal. Vision grossly intact.      Extraocular Movements: Extraocular movements intact.      Conjunctiva/sclera: Conjunctivae normal.   Cardiovascular:      Rate and Rhythm: Normal rate and regular rhythm.      Heart sounds: Normal heart sounds.   Pulmonary:      Effort: Pulmonary effort is normal.      Breath sounds: Normal breath sounds.   Chest:      Comments: Left CW mediport removed  Abdominal:      General: Abdomen is flat. Bowel sounds are normal. There is no distension.      Palpations: Abdomen is soft.      Tenderness: There is no abdominal tenderness.      Comments: Scattered laparoscopic incisions healed   Musculoskeletal:         General: Normal range of motion.      Cervical back: Normal range of motion and neck supple.      Right lower leg: No edema.      Left lower leg: No edema.   Skin:     General: Skin is warm and moist.    Neurological:      General: No focal deficit present.      Mental Status: She is alert and oriented to person, place, and time.   Psychiatric:         Attention and Perception: Attention normal.         Mood and Affect: Mood normal.         Speech: Speech normal.         Behavior: Behavior is cooperative.         Cognition and Memory: Cognition normal.         Judgment: Judgment normal.       Lab Visit on 12/12/2023   Component Date Value    Sodium Level 12/12/2023 141     Potassium Level 12/12/2023 4.0     Chloride 12/12/2023 111 (H)     Carbon Dioxide 12/12/2023 24     Glucose Level 12/12/2023 70 (L)     Blood Urea Nitrogen 12/12/2023 16.5     Creatinine 12/12/2023 0.93     Calcium Level Total 12/12/2023 9.1     Protein Total 12/12/2023 7.1     Albumin Level 12/12/2023 4.2     Globulin 12/12/2023 2.9     Albumin/Globulin Ratio 12/12/2023 1.4     Bilirubin Total 12/12/2023 0.4     Alkaline Phosphatase 12/12/2023 60     Alanine Aminotransferase 12/12/2023 24     Aspartate Aminotransfera* 12/12/2023 24     eGFR 12/12/2023 >60     Carcinoembryonic Antigen 12/12/2023 <1.73     WBC 12/12/2023 4.32 (L)     RBC 12/12/2023 4.12 (L)     Hgb 12/12/2023 12.4     Hct 12/12/2023 37.0     MCV 12/12/2023 89.8     MCH 12/12/2023 30.1     MCHC 12/12/2023 33.5     RDW 12/12/2023 12.2     Platelet 12/12/2023 156     MPV 12/12/2023 9.5     Neut % 12/12/2023 44.0     Lymph % 12/12/2023 46.1     Mono % 12/12/2023 8.3     Eos % 12/12/2023 0.9     Basophil % 12/12/2023 0.5     Lymph # 12/12/2023 1.99     Neut # 12/12/2023 1.90 (L)     Mono # 12/12/2023 0.36     Eos # 12/12/2023 0.04     Baso # 12/12/2023 0.02     IG# 12/12/2023 0.01     IG% 12/12/2023 0.2       ECOG SCORE    0 - Fully active-able to carry on all pre-disease performance without restriction        Assessment:       1. Malignant neoplasm of sigmoid colon    2. Neutropenia, unspecified type    3. Secondary and unspecified malignant neoplasm of intra-abdominal lymph nodes         Plan:       Patient with Stage IIIC Sigmoid colon cancer s/p surgery done 5/18/21. Tumor measured 3.5cm, T3 lesion, Grade 2 with vascular invasion, 7/16 LNs positive, YURIY.  Initial CT C/A/P w/o evidence for any distant metastatic disease.  Per NCCN guidelines, adjuvant treatment recommended with 6 months of FOLFOX chemotherapy. 6 months recommended as opposed to 3 months due to N2 disease, higher risk.  Discussed rationale for adjuvant treatment to decrease risk of recurrence and improve survival. Estimated OS around 75% with treatment.  Received IV Injectafer completed 6/18/21 for iron deficiency anemia.     Patient started cycle 1 FOLFOX on 6/16/21. Multiple dose reductions and delays due to thrombocytopenia.  Started on Nplate weekly 10/6/21 for ITP which helped with the continuation of her chemotherapy.  Completed cycle 12 FOLFOX on 12/10/21.    Patient is doing very will without any signs or symptoms to suggest disease recurrence.  Labs recent good. Mild neutropenia with ANC 1900. This has been up/down. CEA remains normal.   Colonoscopy from 5/9/22 all good. Repeat needed in 3 years.   CT C/A/P from 12/8/23 with YOANNA.  Referred to GYN Dr. Jha at her last appointment for evaluation of left ovarian cyst, post-menopausal. She has not heard from her office. Recent scan did not reveal any abnormal findings.   Continue surveillance visits every 3-6 months and continue with CT scans every 6 months X 5 years per NCCN.   Her MP was removed in July 2023.     All questions answered at this time.        Kamron Garza NELY

## 2023-12-15 ENCOUNTER — OFFICE VISIT (OUTPATIENT)
Dept: HEMATOLOGY/ONCOLOGY | Facility: CLINIC | Age: 53
End: 2023-12-15
Payer: COMMERCIAL

## 2023-12-15 VITALS
TEMPERATURE: 98 F | HEART RATE: 91 BPM | BODY MASS INDEX: 30.77 KG/M2 | HEIGHT: 63 IN | OXYGEN SATURATION: 96 % | SYSTOLIC BLOOD PRESSURE: 113 MMHG | WEIGHT: 173.63 LBS | DIASTOLIC BLOOD PRESSURE: 85 MMHG | RESPIRATION RATE: 14 BRPM

## 2023-12-15 DIAGNOSIS — C18.7 MALIGNANT NEOPLASM OF SIGMOID COLON: Primary | ICD-10-CM

## 2023-12-15 DIAGNOSIS — C77.2 SECONDARY AND UNSPECIFIED MALIGNANT NEOPLASM OF INTRA-ABDOMINAL LYMPH NODES: ICD-10-CM

## 2023-12-15 DIAGNOSIS — D70.9 NEUTROPENIA, UNSPECIFIED TYPE: ICD-10-CM

## 2023-12-15 PROCEDURE — 99214 OFFICE O/P EST MOD 30 MIN: CPT | Mod: S$PBB,,, | Performed by: NURSE PRACTITIONER

## 2023-12-15 PROCEDURE — 99999 PR PBB SHADOW E&M-EST. PATIENT-LVL III: ICD-10-PCS | Mod: PBBFAC,,, | Performed by: NURSE PRACTITIONER

## 2023-12-15 PROCEDURE — 99999 PR PBB SHADOW E&M-EST. PATIENT-LVL III: CPT | Mod: PBBFAC,,, | Performed by: NURSE PRACTITIONER

## 2023-12-15 PROCEDURE — 99214 PR OFFICE/OUTPT VISIT, EST, LEVL IV, 30-39 MIN: ICD-10-PCS | Mod: S$PBB,,, | Performed by: NURSE PRACTITIONER

## 2024-03-21 ENCOUNTER — LAB VISIT (OUTPATIENT)
Dept: LAB | Facility: HOSPITAL | Age: 54
End: 2024-03-21
Attending: INTERNAL MEDICINE
Payer: COMMERCIAL

## 2024-03-21 DIAGNOSIS — C77.2 SECONDARY AND UNSPECIFIED MALIGNANT NEOPLASM OF INTRA-ABDOMINAL LYMPH NODES: ICD-10-CM

## 2024-03-21 DIAGNOSIS — D70.9 NEUTROPENIA, UNSPECIFIED TYPE: ICD-10-CM

## 2024-03-21 DIAGNOSIS — C18.7 MALIGNANT NEOPLASM OF SIGMOID COLON: ICD-10-CM

## 2024-03-21 LAB
ALBUMIN SERPL-MCNC: 4.1 G/DL (ref 3.5–5)
ALBUMIN/GLOB SERPL: 1.3 RATIO (ref 1.1–2)
ALP SERPL-CCNC: 70 UNIT/L (ref 40–150)
ALT SERPL-CCNC: 21 UNIT/L (ref 0–55)
AST SERPL-CCNC: 22 UNIT/L (ref 5–34)
BASOPHILS # BLD AUTO: 0.01 X10(3)/MCL
BASOPHILS NFR BLD AUTO: 0.2 %
BILIRUB SERPL-MCNC: 0.3 MG/DL
BUN SERPL-MCNC: 15.4 MG/DL (ref 9.8–20.1)
CALCIUM SERPL-MCNC: 9.3 MG/DL (ref 8.4–10.2)
CEA SERPL-MCNC: <1.73 NG/ML (ref 0–3)
CHLORIDE SERPL-SCNC: 108 MMOL/L (ref 98–107)
CO2 SERPL-SCNC: 24 MMOL/L (ref 22–29)
CREAT SERPL-MCNC: 0.92 MG/DL (ref 0.55–1.02)
EOSINOPHIL # BLD AUTO: 0.04 X10(3)/MCL (ref 0–0.9)
EOSINOPHIL NFR BLD AUTO: 0.8 %
ERYTHROCYTE [DISTWIDTH] IN BLOOD BY AUTOMATED COUNT: 12.3 % (ref 11.5–17)
GFR SERPLBLD CREATININE-BSD FMLA CKD-EPI: >60 MLS/MIN/1.73/M2
GLOBULIN SER-MCNC: 3.1 GM/DL (ref 2.4–3.5)
GLUCOSE SERPL-MCNC: 76 MG/DL (ref 74–100)
HCT VFR BLD AUTO: 38.7 % (ref 37–47)
HGB BLD-MCNC: 12.8 G/DL (ref 12–16)
IMM GRANULOCYTES # BLD AUTO: 0.01 X10(3)/MCL (ref 0–0.04)
IMM GRANULOCYTES NFR BLD AUTO: 0.2 %
LYMPHOCYTES # BLD AUTO: 2.19 X10(3)/MCL (ref 0.6–4.6)
LYMPHOCYTES NFR BLD AUTO: 45.8 %
MCH RBC QN AUTO: 29.2 PG (ref 27–31)
MCHC RBC AUTO-ENTMCNC: 33.1 G/DL (ref 33–36)
MCV RBC AUTO: 88.4 FL (ref 80–94)
MONOCYTES # BLD AUTO: 0.43 X10(3)/MCL (ref 0.1–1.3)
MONOCYTES NFR BLD AUTO: 9 %
NEUTROPHILS # BLD AUTO: 2.1 X10(3)/MCL (ref 2.1–9.2)
NEUTROPHILS NFR BLD AUTO: 44 %
PLATELET # BLD AUTO: 171 X10(3)/MCL (ref 130–400)
PMV BLD AUTO: 9.3 FL (ref 7.4–10.4)
POTASSIUM SERPL-SCNC: 4.1 MMOL/L (ref 3.5–5.1)
PROT SERPL-MCNC: 7.2 GM/DL (ref 6.4–8.3)
RBC # BLD AUTO: 4.38 X10(6)/MCL (ref 4.2–5.4)
SODIUM SERPL-SCNC: 138 MMOL/L (ref 136–145)
WBC # SPEC AUTO: 4.78 X10(3)/MCL (ref 4.5–11.5)

## 2024-03-21 PROCEDURE — 80053 COMPREHEN METABOLIC PANEL: CPT

## 2024-03-21 PROCEDURE — 85025 COMPLETE CBC W/AUTO DIFF WBC: CPT

## 2024-03-21 PROCEDURE — 82378 CARCINOEMBRYONIC ANTIGEN: CPT

## 2024-03-21 PROCEDURE — 36415 COLL VENOUS BLD VENIPUNCTURE: CPT

## 2024-03-25 NOTE — PROGRESS NOTES
Subjective:       Patient ID: Melissa Jones is a 53 y.o. female.    Surgeon: Dr. Cruz Farmer  GI: Dr. Enrique Comer     1. Sigmoid Colon Cancer--Stage IIIC ([id number])--Diagnosed 21  Biopsy/pathology:  Colonoscopy done 21--circumferential, ulcerated, sigmoid colon mass at 30cm, biopsy positive for moderately differentiated invasive adenocarcinoma, MMR intact(YURIY), scope did traverse the lesion with some maneuvering, internal hemorrhoids noted, normal mucosa in terminal ileum, otherwise normal examination.  Surgery/pathology:  Laparoscopic sigmoid colectomy done 21--adenocarcinoma, moderately differentiated, measuring 3.5cm with invasion through the muscularis propria into pericolic adipose tissue, margins clear,  regional lymph nodes positive for metastatic adenocarcinoma, focal discontinuous extramural tumor extension c/w vascular invasion (pT3N2b).  Imagin. CT A/P w/ contrast done 21--very mild stranding along a short segment of sigmoid colon may correspond with the primary malignancy, no metastatic disease.   2. CT chest done 21--No acute abnormality. No CT evidence of metastatic disease.  3. CT C/A/P done 21--YOANNA.   4. CT C/A/P done 22--YOANNA.   5. CT C/A/P done 22--YOANNA.  6. CT C/A/P done 23--YOANNA, 25mm left ovarian cyst.  7. CT C/A/P on 23--No evidence of recurrent malignancy.      2. Iron Deficiency Anemia--21     3. Idiopathic Thrombocytopenia Purpura - 21  Positive for HLA Class 1A and Anti IIa/IIIa antibodies on 21.      Procedures:   1. EGD done 21--erythema in antrum, c/w gastritis biopsy showing chronic gastritis with focal mild activity, H. pylori negative, normal mucosa in duodenum, normal esophagus and GE junction, moderate amount of bile in stomach.  2. Left subclavian mediport placed 6/15/21--removed 2023.   3. Colonoscopy 22--Dr. Enrique Nahomi showed colonic anastomosis at 15cm, healthy, internal  hemorrhoids, normal mucosa in terminal ileum, repeat recommended in 3 years.      CEA:  05/18/21--3.01  06/10/21--<1.73  12/30/21--2.52  03/21/22--<1.73  06/17/22--<1.73  12/20/22--<1.73  03/13/23--<1.73  06/15/23--<1.73  12/12/23--<1.73  03/21/24--<1.73     Treatment history:  1. Laparoscopic sigmoid colectomy 5/18/21.  2. IV Injectafer 6/11/21 and 6/18/21.   3. FOLFOX X 12 cycles 6/16/21--12/10/21 (multiple dose reductions and delays for thrombocytopenia).  4. Nplate treatment for ITP 10/6/21--12/1/21.     Treatment plan:  Observation     Chief Complaint: Other Misc (Pt reports no concerns today.)    HPI   Patient presents for follow-up of colon cancer. She is doing very well. Recent labs all normal. No complaints.     Past Medical History:   Diagnosis Date    Cervical spondylosis     Colon cancer     GERD (gastroesophageal reflux disease)     MELDIA (iron deficiency anemia)       Review of patient's allergies indicates:  No Known Allergies   Current Outpatient Medications on File Prior to Visit   Medication Sig Dispense Refill    b complex vitamins capsule Take 1 capsule by mouth once daily.      levocetirizine (XYZAL) 5 MG tablet Take 5 mg by mouth every evening.      valACYclovir (VALTREX) 1000 MG tablet Take 2 tabs (2,000 mg) every 12 hours x 1 day at the onset of cold sore symptoms. 30 tablet 6     No current facility-administered medications on file prior to visit.      Review of Systems   Constitutional:  Negative for appetite change, fatigue, fever and unexpected weight change.   HENT:  Negative for mouth sores.    Eyes: Negative.  Negative for visual disturbance.   Respiratory:  Negative for cough and shortness of breath.    Cardiovascular:  Negative for chest pain and leg swelling.   Gastrointestinal:  Negative for abdominal distention, abdominal pain, constipation, diarrhea, nausea, vomiting and reflux.   Genitourinary:  Negative for difficulty urinating, dysuria, frequency and hematuria.    Musculoskeletal:  Negative for arthralgias and back pain.   Integumentary:  Negative for rash.   Neurological:  Negative for weakness and headaches.   Hematological:  Negative for adenopathy.   Psychiatric/Behavioral:  Negative for sleep disturbance. The patient is not nervous/anxious.          Vitals:    04/01/24 0902   BP: 132/88   Pulse: 69   Resp: 14   Temp: 97.7 °F (36.5 °C)       Physical Exam  Constitutional:       Appearance: Normal appearance. She is normal weight.   HENT:      Head: Normocephalic.      Nose: Nose normal.      Mouth/Throat:      Mouth: Mucous membranes are moist.   Eyes:      General: Lids are normal. Vision grossly intact.      Extraocular Movements: Extraocular movements intact.      Conjunctiva/sclera: Conjunctivae normal.   Cardiovascular:      Rate and Rhythm: Normal rate and regular rhythm.      Heart sounds: Normal heart sounds.   Pulmonary:      Effort: Pulmonary effort is normal.      Breath sounds: Normal breath sounds.   Chest:      Comments: Left CW mediport removed  Abdominal:      General: Abdomen is flat. Bowel sounds are normal. There is no distension.      Palpations: Abdomen is soft.      Tenderness: There is no abdominal tenderness.      Comments: Scattered laparoscopic incisions healed   Musculoskeletal:         General: Normal range of motion.      Cervical back: Normal range of motion and neck supple.      Right lower leg: No edema.      Left lower leg: No edema.   Skin:     General: Skin is warm and moist.   Neurological:      General: No focal deficit present.      Mental Status: She is alert and oriented to person, place, and time.   Psychiatric:         Attention and Perception: Attention normal.         Mood and Affect: Mood normal.         Speech: Speech normal.         Behavior: Behavior is cooperative.         Cognition and Memory: Cognition normal.         Judgment: Judgment normal.       Lab Visit on 03/21/2024   Component Date Value    Sodium Level  03/21/2024 138     Potassium Level 03/21/2024 4.1     Chloride 03/21/2024 108 (H)     Carbon Dioxide 03/21/2024 24     Glucose Level 03/21/2024 76     Blood Urea Nitrogen 03/21/2024 15.4     Creatinine 03/21/2024 0.92     Calcium Level Total 03/21/2024 9.3     Protein Total 03/21/2024 7.2     Albumin Level 03/21/2024 4.1     Globulin 03/21/2024 3.1     Albumin/Globulin Ratio 03/21/2024 1.3     Bilirubin Total 03/21/2024 0.3     Alkaline Phosphatase 03/21/2024 70     Alanine Aminotransferase 03/21/2024 21     Aspartate Aminotransfera* 03/21/2024 22     eGFR 03/21/2024 >60     Carcinoembryonic Antigen 03/21/2024 <1.73     WBC 03/21/2024 4.78     RBC 03/21/2024 4.38     Hgb 03/21/2024 12.8     Hct 03/21/2024 38.7     MCV 03/21/2024 88.4     MCH 03/21/2024 29.2     MCHC 03/21/2024 33.1     RDW 03/21/2024 12.3     Platelet 03/21/2024 171     MPV 03/21/2024 9.3     Neut % 03/21/2024 44.0     Lymph % 03/21/2024 45.8     Mono % 03/21/2024 9.0     Eos % 03/21/2024 0.8     Basophil % 03/21/2024 0.2     Lymph # 03/21/2024 2.19     Neut # 03/21/2024 2.10     Mono # 03/21/2024 0.43     Eos # 03/21/2024 0.04     Baso # 03/21/2024 0.01     IG# 03/21/2024 0.01     IG% 03/21/2024 0.2       ECOG SCORE            Assessment:       1. Malignant neoplasm of sigmoid colon      Plan:       Patient with Stage IIIC Sigmoid colon cancer s/p surgery done 5/18/21. Tumor measured 3.5cm, T3 lesion, Grade 2 with vascular invasion, 7/16 LNs positive, YURIY.  Initial CT C/A/P w/o evidence for any distant metastatic disease.  Per NCCN guidelines, adjuvant treatment recommended with 6 months of FOLFOX chemotherapy. 6 months recommended as opposed to 3 months due to N2 disease, higher risk.  Discussed rationale for adjuvant treatment to decrease risk of recurrence and improve survival. Estimated OS around 75% with treatment.  Received IV Injectafer completed 6/18/21 for iron deficiency anemia.     Patient started cycle 1 FOLFOX on 6/16/21. Multiple dose  reductions and delays due to thrombocytopenia.  Started on Nplate weekly 10/6/21 for ITP which helped with the continuation of her chemotherapy.  Completed cycle 12 FOLFOX on 12/10/21.    Patient is doing very will without any signs or symptoms to suggest disease recurrence.  Labs recent good. CEA remains normal.     Colonoscopy from 5/9/22 all good. Repeat needed in 3 years.   CT C/A/P from 12/8/23 with YOANNA.    Continue surveillance visits every 3-6 months and continue with CT scans every 6 months X 5 years per NCCN.   F/u in 3 months with repeat labs and CT C/A/P.     Can change visits to every 6 months after next visit since will be >2 years since surgery.       All questions answered at this time.        Belén Underwood MD

## 2024-04-01 ENCOUNTER — OFFICE VISIT (OUTPATIENT)
Dept: HEMATOLOGY/ONCOLOGY | Facility: CLINIC | Age: 54
End: 2024-04-01
Payer: COMMERCIAL

## 2024-04-01 VITALS
TEMPERATURE: 98 F | BODY MASS INDEX: 30.28 KG/M2 | WEIGHT: 170.88 LBS | HEART RATE: 69 BPM | RESPIRATION RATE: 14 BRPM | OXYGEN SATURATION: 98 % | HEIGHT: 63 IN | SYSTOLIC BLOOD PRESSURE: 132 MMHG | DIASTOLIC BLOOD PRESSURE: 88 MMHG

## 2024-04-01 DIAGNOSIS — C18.7 MALIGNANT NEOPLASM OF SIGMOID COLON: Primary | ICD-10-CM

## 2024-04-01 PROCEDURE — 99999 PR PBB SHADOW E&M-EST. PATIENT-LVL IV: CPT | Mod: PBBFAC,,, | Performed by: INTERNAL MEDICINE

## 2024-04-01 PROCEDURE — 99214 OFFICE O/P EST MOD 30 MIN: CPT | Mod: S$GLB,,, | Performed by: INTERNAL MEDICINE

## 2024-07-03 NOTE — PROGRESS NOTES
Subjective:       Patient ID: Melissa Jones is a 53 y.o. female.    Surgeon: Dr. Cruz Farmer  GI: Dr. Enrique Comer     1. Sigmoid Colon Cancer--Stage IIIC ([id number])--Diagnosed 21  Biopsy/pathology:  Colonoscopy done 21--circumferential, ulcerated, sigmoid colon mass at 30cm, biopsy positive for moderately differentiated invasive adenocarcinoma, MMR intact(YURIY), scope did traverse the lesion with some maneuvering, internal hemorrhoids noted, normal mucosa in terminal ileum, otherwise normal examination.  Surgery/pathology:  Laparoscopic sigmoid colectomy done 21--adenocarcinoma, moderately differentiated, measuring 3.5cm with invasion through the muscularis propria into pericolic adipose tissue, margins clear,  regional lymph nodes positive for metastatic adenocarcinoma, focal discontinuous extramural tumor extension c/w vascular invasion (pT3N2b).  Imagin. CT A/P w/ contrast done 21--very mild stranding along a short segment of sigmoid colon may correspond with the primary malignancy, no metastatic disease.   2. CT chest done 21--No acute abnormality. No CT evidence of metastatic disease.  3. CT C/A/P done 21--YOANNA.   4. CT C/A/P done 22--YOANNA.   5. CT C/A/P done 22--YOANNA.  6. CT C/A/P done 23--YOANNA, 25mm left ovarian cyst.  7. CT C/A/P on 23--No evidence of recurrent malignancy.   8. CT C/A/P on 24--Post partial distal colectomy with no acute pathology or metastatic disease identified at the abdomen or pelvis. No significant interval change compared to CT 2023.     2. Iron Deficiency Anemia--21     3. Idiopathic Thrombocytopenia Purpura - 21  Positive for HLA Class 1A and Anti IIa/IIIa antibodies on 21.      Procedures:   1. EGD done 21--erythema in antrum, c/w gastritis biopsy showing chronic gastritis with focal mild activity, H. pylori negative, normal mucosa in duodenum, normal esophagus and GE junction, moderate  amount of bile in stomach.  2. Left subclavian mediport placed 6/15/21--removed 7/2023.   3. Colonoscopy 5/9/22--Dr. Enrique Comer showed colonic anastomosis at 15cm, healthy, internal hemorrhoids, normal mucosa in terminal ileum, repeat recommended in 3 years.      CEA:  05/18/21--3.01  06/10/21--<1.73  12/30/21--2.52  03/21/22--<1.73  06/17/22--<1.73  12/20/22--<1.73  03/13/23--<1.73  06/15/23--<1.73  12/12/23--<1.73  03/21/24--<1.73  07/08/24--<1.73     Treatment history:  1. Laparoscopic sigmoid colectomy 5/18/21.  2. IV Injectafer 6/11/21 and 6/18/21.   3. FOLFOX X 12 cycles 6/16/21--12/10/21 (multiple dose reductions and delays for thrombocytopenia).  4. Nplate treatment for ITP 10/6/21--12/1/21.     Treatment plan:  Observation     Chief Complaint: Other Misc (Pt reports no concerns today.)    HPI   Patient presents for follow-up of colon cancer. She is doing very well. Recent labs all normal. CT scan with YOANNA. Appetite good and weight stable. Denies any changes in bowel habits. No complaints today.     Past Medical History:   Diagnosis Date    Cervical spondylosis     Colon cancer     GERD (gastroesophageal reflux disease)     MELIDA (iron deficiency anemia)       Review of patient's allergies indicates:  No Known Allergies   Current Outpatient Medications on File Prior to Visit   Medication Sig Dispense Refill    b complex vitamins capsule Take 1 capsule by mouth once daily.      levocetirizine (XYZAL) 5 MG tablet Take 5 mg by mouth every evening.      valACYclovir (VALTREX) 1000 MG tablet Take 2 tabs (2,000 mg) every 12 hours x 1 day at the onset of cold sore symptoms. 30 tablet 6     No current facility-administered medications on file prior to visit.      Review of Systems   Constitutional:  Negative for appetite change, fatigue, fever and unexpected weight change.   HENT:  Negative for mouth sores.    Eyes: Negative.  Negative for visual disturbance.   Respiratory:  Negative for cough and shortness of  breath.    Cardiovascular:  Negative for chest pain and leg swelling.   Gastrointestinal:  Negative for abdominal distention, abdominal pain, constipation, diarrhea, nausea, vomiting and reflux.   Genitourinary:  Negative for difficulty urinating, dysuria, frequency and hematuria.   Musculoskeletal:  Negative for arthralgias and back pain.   Integumentary:  Negative for rash.   Neurological:  Negative for weakness and headaches.   Hematological:  Negative for adenopathy.   Psychiatric/Behavioral:  Negative for sleep disturbance. The patient is not nervous/anxious.          Vitals:    07/09/24 1248   BP: 113/78   Pulse: 80   Resp: 14   Temp: 97.9 °F (36.6 °C)     Physical Exam  Constitutional:       Appearance: Normal appearance. She is normal weight.   HENT:      Head: Normocephalic.      Nose: Nose normal.      Mouth/Throat:      Mouth: Mucous membranes are moist.   Eyes:      General: Lids are normal. Vision grossly intact.      Extraocular Movements: Extraocular movements intact.      Conjunctiva/sclera: Conjunctivae normal.   Cardiovascular:      Rate and Rhythm: Normal rate and regular rhythm.      Heart sounds: Normal heart sounds.   Pulmonary:      Effort: Pulmonary effort is normal.      Breath sounds: Normal breath sounds.   Chest:      Comments: Left CW mediport removed  Abdominal:      General: Abdomen is flat. Bowel sounds are normal. There is no distension.      Palpations: Abdomen is soft.      Tenderness: There is no abdominal tenderness.      Comments: Scattered laparoscopic incisions healed   Musculoskeletal:         General: Normal range of motion.      Cervical back: Normal range of motion and neck supple.      Right lower leg: No edema.      Left lower leg: No edema.   Skin:     General: Skin is warm and moist.   Neurological:      General: No focal deficit present.      Mental Status: She is alert and oriented to person, place, and time.   Psychiatric:         Attention and Perception: Attention  normal.         Mood and Affect: Mood normal.         Speech: Speech normal.         Behavior: Behavior is cooperative.         Cognition and Memory: Cognition normal.         Judgment: Judgment normal.       Lab Visit on 07/08/2024   Component Date Value    Sodium 07/08/2024 139     Potassium 07/08/2024 4.3     Chloride 07/08/2024 110 (H)     CO2 07/08/2024 23     Glucose 07/08/2024 81     Blood Urea Nitrogen 07/08/2024 17.5     Creatinine 07/08/2024 0.94     Calcium 07/08/2024 8.9     Protein Total 07/08/2024 6.7     Albumin 07/08/2024 3.7     Globulin 07/08/2024 3.0     Albumin/Globulin Ratio 07/08/2024 1.2     Bilirubin Total 07/08/2024 0.4     ALP 07/08/2024 65     ALT 07/08/2024 18     AST 07/08/2024 19     eGFR 07/08/2024 >60     Anion Gap 07/08/2024 6.0     BUN/Creatinine Ratio 07/08/2024 19     Carcinoembryonic Antigen 07/08/2024 <1.73     WBC 07/08/2024 4.12 (L)     RBC 07/08/2024 4.08 (L)     Hgb 07/08/2024 12.1     Hct 07/08/2024 36.6 (L)     MCV 07/08/2024 89.7     MCH 07/08/2024 29.7     MCHC 07/08/2024 33.1     RDW 07/08/2024 12.0     Platelet 07/08/2024 169     MPV 07/08/2024 9.4     Neut % 07/08/2024 42.2     Lymph % 07/08/2024 47.8     Mono % 07/08/2024 8.0     Eos % 07/08/2024 1.0     Basophil % 07/08/2024 0.5     Lymph # 07/08/2024 1.97     Neut # 07/08/2024 1.74 (L)     Mono # 07/08/2024 0.33     Eos # 07/08/2024 0.04     Baso # 07/08/2024 0.02     IG# 07/08/2024 0.02     IG% 07/08/2024 0.5       ECOG SCORE    0 - Fully active-able to carry on all pre-disease performance without restriction        Assessment:       1. Malignant neoplasm of sigmoid colon    2. Secondary and unspecified malignant neoplasm of intra-abdominal lymph nodes    3. Encounter for screening mammogram for breast cancer    4. Neutropenia, unspecified type      Plan:       Patient with Stage IIIC Sigmoid colon cancer s/p surgery done 5/18/21. Tumor measured 3.5cm, T3 lesion, Grade 2 with vascular invasion, 7/16 LNs positive,  YURIY.  Initial CT C/A/P w/o evidence for any distant metastatic disease.  Per NCCN guidelines, adjuvant treatment recommended with 6 months of FOLFOX chemotherapy. 6 months recommended as opposed to 3 months due to N2 disease, higher risk.  Discussed rationale for adjuvant treatment to decrease risk of recurrence and improve survival. Estimated OS around 75% with treatment.  Received IV Injectafer completed 6/18/21 for iron deficiency anemia.     Patient started cycle 1 FOLFOX on 6/16/21. Multiple dose reductions and delays due to thrombocytopenia.  Started on Nplate weekly 10/6/21 for ITP which helped with the continuation of her chemotherapy.  Completed cycle 12 FOLFOX on 12/10/21.    Patient is doing very will without any signs or symptoms to suggest disease recurrence.  Labs recent good. CEA remains normal.   Colonoscopy from 5/9/22 all good. Repeat needed in 3 years.   CT C/A/P from 6/28/24 with YOANNA.  Can change visits to every 6 months since she is now >2 years since surgery.   Will have her follow-up in 6 months after CT scans completed.   She still would like her CEA checked in 3 months for peace of mind. Will order.   Admits she has never had a screening MMG done. Will send orders for MMG.     Continue surveillance visits every 3-6 months and continue with CT scans every 6 months X 5 years per NCCN.   All questions answered at this time.       Kamron Garza, NELY

## 2024-07-08 ENCOUNTER — LAB VISIT (OUTPATIENT)
Dept: LAB | Facility: HOSPITAL | Age: 54
End: 2024-07-08
Attending: INTERNAL MEDICINE
Payer: COMMERCIAL

## 2024-07-08 DIAGNOSIS — C18.7 MALIGNANT NEOPLASM OF SIGMOID COLON: ICD-10-CM

## 2024-07-08 LAB
ALBUMIN SERPL-MCNC: 3.7 G/DL (ref 3.5–5)
ALBUMIN/GLOB SERPL: 1.2 RATIO (ref 1.1–2)
ALP SERPL-CCNC: 65 UNIT/L (ref 40–150)
ALT SERPL-CCNC: 18 UNIT/L (ref 0–55)
ANION GAP SERPL CALC-SCNC: 6 MEQ/L
AST SERPL-CCNC: 19 UNIT/L (ref 5–34)
BASOPHILS # BLD AUTO: 0.02 X10(3)/MCL
BASOPHILS NFR BLD AUTO: 0.5 %
BILIRUB SERPL-MCNC: 0.4 MG/DL
BUN SERPL-MCNC: 17.5 MG/DL (ref 9.8–20.1)
CALCIUM SERPL-MCNC: 8.9 MG/DL (ref 8.4–10.2)
CEA SERPL-MCNC: <1.73 NG/ML (ref 0–3)
CHLORIDE SERPL-SCNC: 110 MMOL/L (ref 98–107)
CO2 SERPL-SCNC: 23 MMOL/L (ref 22–29)
CREAT SERPL-MCNC: 0.94 MG/DL (ref 0.55–1.02)
CREAT/UREA NIT SERPL: 19
EOSINOPHIL # BLD AUTO: 0.04 X10(3)/MCL (ref 0–0.9)
EOSINOPHIL NFR BLD AUTO: 1 %
ERYTHROCYTE [DISTWIDTH] IN BLOOD BY AUTOMATED COUNT: 12 % (ref 11.5–17)
GFR SERPLBLD CREATININE-BSD FMLA CKD-EPI: >60 ML/MIN/1.73/M2
GLOBULIN SER-MCNC: 3 GM/DL (ref 2.4–3.5)
GLUCOSE SERPL-MCNC: 81 MG/DL (ref 74–100)
HCT VFR BLD AUTO: 36.6 % (ref 37–47)
HGB BLD-MCNC: 12.1 G/DL (ref 12–16)
IMM GRANULOCYTES # BLD AUTO: 0.02 X10(3)/MCL (ref 0–0.04)
IMM GRANULOCYTES NFR BLD AUTO: 0.5 %
LYMPHOCYTES # BLD AUTO: 1.97 X10(3)/MCL (ref 0.6–4.6)
LYMPHOCYTES NFR BLD AUTO: 47.8 %
MCH RBC QN AUTO: 29.7 PG (ref 27–31)
MCHC RBC AUTO-ENTMCNC: 33.1 G/DL (ref 33–36)
MCV RBC AUTO: 89.7 FL (ref 80–94)
MONOCYTES # BLD AUTO: 0.33 X10(3)/MCL (ref 0.1–1.3)
MONOCYTES NFR BLD AUTO: 8 %
NEUTROPHILS # BLD AUTO: 1.74 X10(3)/MCL (ref 2.1–9.2)
NEUTROPHILS NFR BLD AUTO: 42.2 %
PLATELET # BLD AUTO: 169 X10(3)/MCL (ref 130–400)
PMV BLD AUTO: 9.4 FL (ref 7.4–10.4)
POTASSIUM SERPL-SCNC: 4.3 MMOL/L (ref 3.5–5.1)
PROT SERPL-MCNC: 6.7 GM/DL (ref 6.4–8.3)
RBC # BLD AUTO: 4.08 X10(6)/MCL (ref 4.2–5.4)
SODIUM SERPL-SCNC: 139 MMOL/L (ref 136–145)
WBC # BLD AUTO: 4.12 X10(3)/MCL (ref 4.5–11.5)

## 2024-07-08 PROCEDURE — 80053 COMPREHEN METABOLIC PANEL: CPT

## 2024-07-08 PROCEDURE — 36415 COLL VENOUS BLD VENIPUNCTURE: CPT

## 2024-07-08 PROCEDURE — 85025 COMPLETE CBC W/AUTO DIFF WBC: CPT

## 2024-07-08 PROCEDURE — 82378 CARCINOEMBRYONIC ANTIGEN: CPT

## 2024-07-09 ENCOUNTER — OFFICE VISIT (OUTPATIENT)
Dept: HEMATOLOGY/ONCOLOGY | Facility: CLINIC | Age: 54
End: 2024-07-09
Payer: COMMERCIAL

## 2024-07-09 VITALS
SYSTOLIC BLOOD PRESSURE: 113 MMHG | WEIGHT: 169.69 LBS | HEART RATE: 80 BPM | OXYGEN SATURATION: 97 % | HEIGHT: 63 IN | RESPIRATION RATE: 14 BRPM | TEMPERATURE: 98 F | BODY MASS INDEX: 30.07 KG/M2 | DIASTOLIC BLOOD PRESSURE: 78 MMHG

## 2024-07-09 DIAGNOSIS — C18.7 MALIGNANT NEOPLASM OF SIGMOID COLON: Primary | ICD-10-CM

## 2024-07-09 DIAGNOSIS — C77.2 SECONDARY AND UNSPECIFIED MALIGNANT NEOPLASM OF INTRA-ABDOMINAL LYMPH NODES: ICD-10-CM

## 2024-07-09 DIAGNOSIS — D70.9 NEUTROPENIA, UNSPECIFIED TYPE: ICD-10-CM

## 2024-07-09 DIAGNOSIS — Z12.31 ENCOUNTER FOR SCREENING MAMMOGRAM FOR BREAST CANCER: ICD-10-CM

## 2024-07-09 PROBLEM — D69.6 THROMBOCYTOPENIA, UNSPECIFIED: Status: RESOLVED | Noted: 2023-12-11 | Resolved: 2024-07-09

## 2024-07-09 PROCEDURE — 99999 PR PBB SHADOW E&M-EST. PATIENT-LVL IV: CPT | Mod: PBBFAC,,, | Performed by: NURSE PRACTITIONER

## 2024-07-09 PROCEDURE — 99214 OFFICE O/P EST MOD 30 MIN: CPT | Mod: S$GLB,,, | Performed by: NURSE PRACTITIONER

## 2024-08-20 ENCOUNTER — HOSPITAL ENCOUNTER (OUTPATIENT)
Dept: RADIOLOGY | Facility: HOSPITAL | Age: 54
Discharge: HOME | End: 2024-08-20
Attending: NURSE PRACTITIONER
Payer: COMMERCIAL

## 2024-08-20 DIAGNOSIS — Z12.31 ENCOUNTER FOR SCREENING MAMMOGRAM FOR BREAST CANCER: ICD-10-CM

## 2024-08-20 PROCEDURE — 77067 SCR MAMMO BI INCL CAD: CPT | Mod: 26,,, | Performed by: RADIOLOGY

## 2024-08-20 PROCEDURE — 77063 BREAST TOMOSYNTHESIS BI: CPT | Mod: TC

## 2024-08-20 PROCEDURE — 77067 SCR MAMMO BI INCL CAD: CPT | Mod: TC

## 2024-08-20 PROCEDURE — 77063 BREAST TOMOSYNTHESIS BI: CPT | Mod: 26,,, | Performed by: RADIOLOGY

## 2024-10-09 ENCOUNTER — LAB VISIT (OUTPATIENT)
Dept: LAB | Facility: HOSPITAL | Age: 54
End: 2024-10-09
Attending: INTERNAL MEDICINE
Payer: COMMERCIAL

## 2024-10-09 DIAGNOSIS — C18.7 MALIGNANT NEOPLASM OF SIGMOID COLON: ICD-10-CM

## 2024-10-09 LAB — CEA SERPL-MCNC: <1.73 NG/ML (ref 0–3)

## 2024-10-09 PROCEDURE — 82378 CARCINOEMBRYONIC ANTIGEN: CPT

## 2024-10-09 PROCEDURE — 36415 COLL VENOUS BLD VENIPUNCTURE: CPT

## 2025-01-02 ENCOUNTER — LAB VISIT (OUTPATIENT)
Dept: LAB | Facility: HOSPITAL | Age: 55
End: 2025-01-02
Attending: NURSE PRACTITIONER
Payer: COMMERCIAL

## 2025-01-02 DIAGNOSIS — C77.2 SECONDARY AND UNSPECIFIED MALIGNANT NEOPLASM OF INTRA-ABDOMINAL LYMPH NODES: ICD-10-CM

## 2025-01-02 DIAGNOSIS — C18.7 MALIGNANT NEOPLASM OF SIGMOID COLON: ICD-10-CM

## 2025-01-02 LAB
ALBUMIN SERPL-MCNC: 3.7 G/DL (ref 3.5–5)
ALBUMIN/GLOB SERPL: 1.1 RATIO (ref 1.1–2)
ALP SERPL-CCNC: 72 UNIT/L (ref 40–150)
ALT SERPL-CCNC: 42 UNIT/L (ref 0–55)
ANION GAP SERPL CALC-SCNC: 8 MEQ/L
AST SERPL-CCNC: 29 UNIT/L (ref 5–34)
BASOPHILS # BLD AUTO: 0.01 X10(3)/MCL
BASOPHILS NFR BLD AUTO: 0.3 %
BILIRUB SERPL-MCNC: 0.5 MG/DL
BUN SERPL-MCNC: 21.5 MG/DL (ref 9.8–20.1)
CALCIUM SERPL-MCNC: 8.8 MG/DL (ref 8.4–10.2)
CEA SERPL-MCNC: <1.73 NG/ML (ref 0–3)
CHLORIDE SERPL-SCNC: 108 MMOL/L (ref 98–107)
CO2 SERPL-SCNC: 26 MMOL/L (ref 22–29)
CREAT SERPL-MCNC: 1.11 MG/DL (ref 0.55–1.02)
CREAT/UREA NIT SERPL: 19
EOSINOPHIL # BLD AUTO: 0.02 X10(3)/MCL (ref 0–0.9)
EOSINOPHIL NFR BLD AUTO: 0.6 %
ERYTHROCYTE [DISTWIDTH] IN BLOOD BY AUTOMATED COUNT: 12.4 % (ref 11.5–17)
GFR SERPLBLD CREATININE-BSD FMLA CKD-EPI: 59 ML/MIN/1.73/M2
GLOBULIN SER-MCNC: 3.4 GM/DL (ref 2.4–3.5)
GLUCOSE SERPL-MCNC: 106 MG/DL (ref 74–100)
HCT VFR BLD AUTO: 39.7 % (ref 37–47)
HGB BLD-MCNC: 13.3 G/DL (ref 12–16)
IMM GRANULOCYTES # BLD AUTO: 0.01 X10(3)/MCL (ref 0–0.04)
IMM GRANULOCYTES NFR BLD AUTO: 0.3 %
LYMPHOCYTES # BLD AUTO: 1.62 X10(3)/MCL (ref 0.6–4.6)
LYMPHOCYTES NFR BLD AUTO: 46.8 %
MCH RBC QN AUTO: 29.6 PG (ref 27–31)
MCHC RBC AUTO-ENTMCNC: 33.5 G/DL (ref 33–36)
MCV RBC AUTO: 88.4 FL (ref 80–94)
MONOCYTES # BLD AUTO: 0.26 X10(3)/MCL (ref 0.1–1.3)
MONOCYTES NFR BLD AUTO: 7.5 %
NEUTROPHILS # BLD AUTO: 1.54 X10(3)/MCL (ref 2.1–9.2)
NEUTROPHILS NFR BLD AUTO: 44.5 %
PLATELET # BLD AUTO: 153 X10(3)/MCL (ref 130–400)
PMV BLD AUTO: 8.6 FL (ref 7.4–10.4)
POTASSIUM SERPL-SCNC: 3.4 MMOL/L (ref 3.5–5.1)
PROT SERPL-MCNC: 7.1 GM/DL (ref 6.4–8.3)
RBC # BLD AUTO: 4.49 X10(6)/MCL (ref 4.2–5.4)
SODIUM SERPL-SCNC: 142 MMOL/L (ref 136–145)
WBC # BLD AUTO: 3.46 X10(3)/MCL (ref 4.5–11.5)

## 2025-01-02 PROCEDURE — 82378 CARCINOEMBRYONIC ANTIGEN: CPT

## 2025-01-02 PROCEDURE — 85025 COMPLETE CBC W/AUTO DIFF WBC: CPT

## 2025-01-02 PROCEDURE — 36415 COLL VENOUS BLD VENIPUNCTURE: CPT

## 2025-01-02 PROCEDURE — 80053 COMPREHEN METABOLIC PANEL: CPT

## 2025-01-08 NOTE — PROGRESS NOTES
Subjective:       Patient ID: Melissa Jones is a 54 y.o. female.    Surgeon: Dr. Cruz Farmer  GI: Dr. Enrique Comer     1. Sigmoid Colon Cancer--Stage IIIC ([id number])--Diagnosed 21  Biopsy/pathology:  Colonoscopy done 21--circumferential, ulcerated, sigmoid colon mass at 30cm, biopsy positive for moderately differentiated invasive adenocarcinoma, MMR intact(YURIY), scope did traverse the lesion with some maneuvering, internal hemorrhoids noted, normal mucosa in terminal ileum, otherwise normal examination.  Surgery/pathology:  Laparoscopic sigmoid colectomy done 21--adenocarcinoma, moderately differentiated, measuring 3.5cm with invasion through the muscularis propria into pericolic adipose tissue, margins clear,  regional lymph nodes positive for metastatic adenocarcinoma, focal discontinuous extramural tumor extension c/w vascular invasion (pT3N2b).  Imagin. CT A/P w/ contrast done 21--very mild stranding along a short segment of sigmoid colon may correspond with the primary malignancy, no metastatic disease.   2. CT chest done 21--No acute abnormality. No CT evidence of metastatic disease.  3. CT C/A/P done 21--YOANNA.   4. CT C/A/P done 22--YOANNA.   5. CT C/A/P done 22--YOANNA.  6. CT C/A/P done 23--YOANNA, 25mm left ovarian cyst.  7. CT C/A/P on 23--No evidence of recurrent malignancy.   8. CT C/A/P on 24--YOANNA.  9. CT C/A/P on 1/10/25--YOANNA.      2. Iron Deficiency Anemia--21     3. Idiopathic Thrombocytopenia Purpura - 21  Positive for HLA Class 1A and Anti IIa/IIIa antibodies on 21.      Procedures:   1. EGD done 21--erythema in antrum, c/w gastritis biopsy showing chronic gastritis with focal mild activity, H. pylori negative, normal mucosa in duodenum, normal esophagus and GE junction, moderate amount of bile in stomach.  2. Left subclavian mediport placed 6/15/21--removed 2023.   3. Colonoscopy 22--Dr. Enrique Comer  showed colonic anastomosis at 15cm, healthy, internal hemorrhoids, normal mucosa in terminal ileum, repeat recommended in 3 years.      CEA:  05/18/21--3.01  06/10/21--<1.73  12/30/21--2.52  03/21/22--<1.73  06/17/22--<1.73  12/20/22--<1.73  03/13/23--<1.73  06/15/23--<1.73  12/12/23--<1.73  03/21/24--<1.73  01/02/25--<1.73     Treatment history:  1. Laparoscopic sigmoid colectomy 5/18/21.  2. IV Injectafer 6/11/21 and 6/18/21.   3. FOLFOX X 12 cycles 6/16/21--12/10/21 (multiple dose reductions and delays for thrombocytopenia).  4. Nplate treatment for ITP 10/6/21--12/1/21.     Treatment plan:  Observation     Chief Complaint: Results    HPI   Patient presents for follow-up of colon cancer. She is doing very well. Recent labs good aside from dehydration and she mentions she had just gotten over stomach virus. CT with YOANNA and CEA WNL and I went over results with patient.     Past Medical History:   Diagnosis Date    Cervical spondylosis     Colon cancer     GERD (gastroesophageal reflux disease)     MELIDA (iron deficiency anemia)       Review of patient's allergies indicates:  No Known Allergies   Current Outpatient Medications on File Prior to Visit   Medication Sig Dispense Refill    b complex vitamins capsule Take 1 capsule by mouth once daily.      levocetirizine (XYZAL) 5 MG tablet Take 5 mg by mouth every evening.      valACYclovir (VALTREX) 1000 MG tablet Take 2 tabs (2,000 mg) every 12 hours x 1 day at the onset of cold sore symptoms. 30 tablet 6     No current facility-administered medications on file prior to visit.      Review of Systems   Constitutional:  Negative for appetite change, fatigue, fever and unexpected weight change.   HENT:  Negative for mouth sores.    Eyes: Negative.  Negative for visual disturbance.   Respiratory:  Negative for cough and shortness of breath.    Cardiovascular:  Negative for chest pain and leg swelling.   Gastrointestinal:  Negative for abdominal distention, abdominal pain,  constipation, diarrhea, nausea, vomiting and reflux.   Genitourinary:  Negative for difficulty urinating, dysuria, frequency and hematuria.   Musculoskeletal:  Negative for arthralgias and back pain.   Integumentary:  Negative for rash.   Neurological:  Negative for weakness and headaches.   Hematological:  Negative for adenopathy.   Psychiatric/Behavioral:  Negative for sleep disturbance. The patient is not nervous/anxious.          Vitals:    01/14/25 0952   BP: 123/83   Pulse: 67   Resp: 18   Temp: 97.4 °F (36.3 °C)         Physical Exam  Constitutional:       Appearance: Normal appearance. She is normal weight.   HENT:      Head: Normocephalic.      Nose: Nose normal.      Mouth/Throat:      Mouth: Mucous membranes are moist.   Eyes:      General: Lids are normal. Vision grossly intact.      Extraocular Movements: Extraocular movements intact.      Conjunctiva/sclera: Conjunctivae normal.   Cardiovascular:      Rate and Rhythm: Normal rate and regular rhythm.      Heart sounds: Normal heart sounds.   Pulmonary:      Effort: Pulmonary effort is normal.      Breath sounds: Normal breath sounds.   Chest:      Comments: Left CW mediport removed  Abdominal:      General: Abdomen is flat. Bowel sounds are normal. There is no distension.      Palpations: Abdomen is soft.      Tenderness: There is no abdominal tenderness.      Comments: Scattered laparoscopic incisions healed   Musculoskeletal:         General: Normal range of motion.      Cervical back: Normal range of motion and neck supple.      Right lower leg: No edema.      Left lower leg: No edema.   Skin:     General: Skin is warm and moist.   Neurological:      General: No focal deficit present.      Mental Status: She is alert and oriented to person, place, and time.   Psychiatric:         Attention and Perception: Attention normal.         Mood and Affect: Mood normal.         Speech: Speech normal.         Behavior: Behavior is cooperative.         Cognition  and Memory: Cognition normal.         Judgment: Judgment normal.       Lab Visit on 01/02/2025   Component Date Value    Sodium 01/02/2025 142     Potassium 01/02/2025 3.4 (L)     Chloride 01/02/2025 108 (H)     CO2 01/02/2025 26     Glucose 01/02/2025 106 (H)     Blood Urea Nitrogen 01/02/2025 21.5 (H)     Creatinine 01/02/2025 1.11 (H)     Calcium 01/02/2025 8.8     Protein Total 01/02/2025 7.1     Albumin 01/02/2025 3.7     Globulin 01/02/2025 3.4     Albumin/Globulin Ratio 01/02/2025 1.1     Bilirubin Total 01/02/2025 0.5     ALP 01/02/2025 72     ALT 01/02/2025 42     AST 01/02/2025 29     eGFR 01/02/2025 59     Anion Gap 01/02/2025 8.0     BUN/Creatinine Ratio 01/02/2025 19     Carcinoembryonic Antigen 01/02/2025 <1.73     WBC 01/02/2025 3.46 (L)     RBC 01/02/2025 4.49     Hgb 01/02/2025 13.3     Hct 01/02/2025 39.7     MCV 01/02/2025 88.4     MCH 01/02/2025 29.6     MCHC 01/02/2025 33.5     RDW 01/02/2025 12.4     Platelet 01/02/2025 153     MPV 01/02/2025 8.6     Neut % 01/02/2025 44.5     Lymph % 01/02/2025 46.8     Mono % 01/02/2025 7.5     Eos % 01/02/2025 0.6     Basophil % 01/02/2025 0.3     Lymph # 01/02/2025 1.62     Neut # 01/02/2025 1.54 (L)     Mono # 01/02/2025 0.26     Eos # 01/02/2025 0.02     Baso # 01/02/2025 0.01     Imm Gran # 01/02/2025 0.01     Imm Grans % 01/02/2025 0.3       ECOG SCORE            Assessment:       1. Malignant neoplasm of sigmoid colon    2. Secondary and unspecified malignant neoplasm of intra-abdominal lymph nodes        Plan:       Patient with Stage IIIC Sigmoid colon cancer s/p surgery done 5/18/21. Tumor measured 3.5cm, T3 lesion, Grade 2 with vascular invasion, 7/16 LNs positive, YURIY.  Initial CT C/A/P w/o evidence for any distant metastatic disease.  Per NCCN guidelines, adjuvant treatment recommended with 6 months of FOLFOX chemotherapy. 6 months recommended as opposed to 3 months due to N2 disease, higher risk.  Discussed rationale for adjuvant treatment to  decrease risk of recurrence and improve survival. Estimated OS around 75% with treatment.  Received IV Injectafer completed 6/18/21 for iron deficiency anemia.     Patient started cycle 1 FOLFOX on 6/16/21. Multiple dose reductions and delays due to thrombocytopenia.  Started on Nplate weekly 10/6/21 for ITP which helped with the continuation of her chemotherapy.  Completed cycle 12 FOLFOX on 12/10/21.    Patient is doing very will without any signs or symptoms to suggest disease recurrence.  Labs recent good aside from mild neutropenia, not new and some mild dehydration from recent stomach virus. CEA remains normal.     Colonoscopy from 5/9/22 all good. Repeat needed in 3 years, due in 5/2025, will send referral to Dr. Enrique Comer.  CT C/A/P from 1/10/25 with YOANNA.    Continue surveillance visits every 6 months with CT scans X 5 years per NCCN.   F/u in 6 months with repeat labs and CT C/A/P to be done at [facility].       All questions answered at this time.        Belén Underwood MD    Visit today included increased complexity associated with the care of the episodic problem colon cancer, addressing and managing the longitudinal care of the patient's colon cancer.

## 2025-01-14 ENCOUNTER — OFFICE VISIT (OUTPATIENT)
Dept: HEMATOLOGY/ONCOLOGY | Facility: CLINIC | Age: 55
End: 2025-01-14
Payer: COMMERCIAL

## 2025-01-14 VITALS
HEIGHT: 63 IN | HEART RATE: 67 BPM | BODY MASS INDEX: 29.15 KG/M2 | OXYGEN SATURATION: 99 % | SYSTOLIC BLOOD PRESSURE: 123 MMHG | TEMPERATURE: 97 F | WEIGHT: 164.5 LBS | DIASTOLIC BLOOD PRESSURE: 83 MMHG | RESPIRATION RATE: 18 BRPM

## 2025-01-14 DIAGNOSIS — C18.7 MALIGNANT NEOPLASM OF SIGMOID COLON: Primary | ICD-10-CM

## 2025-01-14 DIAGNOSIS — C77.2 SECONDARY AND UNSPECIFIED MALIGNANT NEOPLASM OF INTRA-ABDOMINAL LYMPH NODES: ICD-10-CM

## 2025-01-14 PROCEDURE — 3008F BODY MASS INDEX DOCD: CPT | Mod: CPTII,S$GLB,, | Performed by: INTERNAL MEDICINE

## 2025-01-14 PROCEDURE — 3074F SYST BP LT 130 MM HG: CPT | Mod: CPTII,S$GLB,, | Performed by: INTERNAL MEDICINE

## 2025-01-14 PROCEDURE — 99999 PR PBB SHADOW E&M-EST. PATIENT-LVL V: CPT | Mod: PBBFAC,,, | Performed by: INTERNAL MEDICINE

## 2025-01-14 PROCEDURE — G2211 COMPLEX E/M VISIT ADD ON: HCPCS | Mod: S$GLB,,, | Performed by: INTERNAL MEDICINE

## 2025-01-14 PROCEDURE — 1159F MED LIST DOCD IN RCRD: CPT | Mod: CPTII,S$GLB,, | Performed by: INTERNAL MEDICINE

## 2025-01-14 PROCEDURE — 1160F RVW MEDS BY RX/DR IN RCRD: CPT | Mod: CPTII,S$GLB,, | Performed by: INTERNAL MEDICINE

## 2025-01-14 PROCEDURE — 3079F DIAST BP 80-89 MM HG: CPT | Mod: CPTII,S$GLB,, | Performed by: INTERNAL MEDICINE

## 2025-01-14 PROCEDURE — 99214 OFFICE O/P EST MOD 30 MIN: CPT | Mod: S$GLB,,, | Performed by: INTERNAL MEDICINE

## 2025-07-07 ENCOUNTER — HOSPITAL ENCOUNTER (OUTPATIENT)
Dept: RADIOLOGY | Facility: HOSPITAL | Age: 55
Discharge: HOME | End: 2025-07-07
Attending: INTERNAL MEDICINE
Payer: COMMERCIAL

## 2025-07-07 DIAGNOSIS — C77.2 SECONDARY AND UNSPECIFIED MALIGNANT NEOPLASM OF INTRA-ABDOMINAL LYMPH NODES: ICD-10-CM

## 2025-07-07 DIAGNOSIS — C18.7 MALIGNANT NEOPLASM OF SIGMOID COLON: ICD-10-CM

## 2025-07-07 PROCEDURE — 74178 CT ABD&PLV WO CNTR FLWD CNTR: CPT | Mod: TC

## 2025-07-07 PROCEDURE — 71260 CT THORAX DX C+: CPT | Mod: TC

## 2025-07-07 PROCEDURE — 25500020 PHARM REV CODE 255: Performed by: INTERNAL MEDICINE

## 2025-07-07 RX ADMIN — IOHEXOL 75 ML: 350 INJECTION, SOLUTION INTRAVENOUS at 09:07

## 2025-07-08 ENCOUNTER — LAB VISIT (OUTPATIENT)
Dept: LAB | Facility: HOSPITAL | Age: 55
End: 2025-07-08
Attending: INTERNAL MEDICINE
Payer: COMMERCIAL

## 2025-07-08 DIAGNOSIS — C77.2 SECONDARY AND UNSPECIFIED MALIGNANT NEOPLASM OF INTRA-ABDOMINAL LYMPH NODES: ICD-10-CM

## 2025-07-08 DIAGNOSIS — C18.7 MALIGNANT NEOPLASM OF SIGMOID COLON: ICD-10-CM

## 2025-07-08 LAB
ALBUMIN SERPL-MCNC: 3.7 G/DL (ref 3.5–5)
ALBUMIN/GLOB SERPL: 1.1 RATIO (ref 1.1–2)
ALP SERPL-CCNC: 67 UNIT/L (ref 40–150)
ALT SERPL-CCNC: 20 UNIT/L (ref 0–55)
ANION GAP SERPL CALC-SCNC: 4 MEQ/L
AST SERPL-CCNC: 23 UNIT/L (ref 11–45)
BASOPHILS # BLD AUTO: 0.01 X10(3)/MCL
BASOPHILS NFR BLD AUTO: 0.3 %
BILIRUB SERPL-MCNC: 0.3 MG/DL
BUN SERPL-MCNC: 14.3 MG/DL (ref 9.8–20.1)
CALCIUM SERPL-MCNC: 8.9 MG/DL (ref 8.4–10.2)
CEA SERPL-MCNC: <1.73 NG/ML (ref 0–3)
CHLORIDE SERPL-SCNC: 109 MMOL/L (ref 98–107)
CO2 SERPL-SCNC: 25 MMOL/L (ref 22–29)
CREAT SERPL-MCNC: 0.93 MG/DL (ref 0.55–1.02)
CREAT/UREA NIT SERPL: 15
EOSINOPHIL # BLD AUTO: 0.04 X10(3)/MCL (ref 0–0.9)
EOSINOPHIL NFR BLD AUTO: 1.1 %
ERYTHROCYTE [DISTWIDTH] IN BLOOD BY AUTOMATED COUNT: 12 % (ref 11.5–17)
GFR SERPLBLD CREATININE-BSD FMLA CKD-EPI: >60 ML/MIN/1.73/M2
GLOBULIN SER-MCNC: 3.5 GM/DL (ref 2.4–3.5)
GLUCOSE SERPL-MCNC: 117 MG/DL (ref 74–100)
HCT VFR BLD AUTO: 38.6 % (ref 37–47)
HGB BLD-MCNC: 12.8 G/DL (ref 12–16)
IMM GRANULOCYTES # BLD AUTO: 0 X10(3)/MCL (ref 0–0.04)
IMM GRANULOCYTES NFR BLD AUTO: 0 %
LYMPHOCYTES # BLD AUTO: 1.72 X10(3)/MCL (ref 0.6–4.6)
LYMPHOCYTES NFR BLD AUTO: 48.9 %
MCH RBC QN AUTO: 29.9 PG (ref 27–31)
MCHC RBC AUTO-ENTMCNC: 33.2 G/DL (ref 33–36)
MCV RBC AUTO: 90.2 FL (ref 80–94)
MONOCYTES # BLD AUTO: 0.26 X10(3)/MCL (ref 0.1–1.3)
MONOCYTES NFR BLD AUTO: 7.4 %
NEUTROPHILS # BLD AUTO: 1.49 X10(3)/MCL (ref 2.1–9.2)
NEUTROPHILS NFR BLD AUTO: 42.3 %
PLATELET # BLD AUTO: 149 X10(3)/MCL (ref 130–400)
PMV BLD AUTO: 9.6 FL (ref 7.4–10.4)
POTASSIUM SERPL-SCNC: 4.2 MMOL/L (ref 3.5–5.1)
PROT SERPL-MCNC: 7.2 GM/DL (ref 6.4–8.3)
RBC # BLD AUTO: 4.28 X10(6)/MCL (ref 4.2–5.4)
SODIUM SERPL-SCNC: 138 MMOL/L (ref 136–145)
WBC # BLD AUTO: 3.52 X10(3)/MCL (ref 4.5–11.5)

## 2025-07-08 PROCEDURE — 80053 COMPREHEN METABOLIC PANEL: CPT

## 2025-07-08 PROCEDURE — 82378 CARCINOEMBRYONIC ANTIGEN: CPT

## 2025-07-08 PROCEDURE — 85025 COMPLETE CBC W/AUTO DIFF WBC: CPT

## 2025-07-08 PROCEDURE — 36415 COLL VENOUS BLD VENIPUNCTURE: CPT

## 2025-07-09 ENCOUNTER — OFFICE VISIT (OUTPATIENT)
Dept: HEMATOLOGY/ONCOLOGY | Facility: CLINIC | Age: 55
End: 2025-07-09
Payer: COMMERCIAL

## 2025-07-09 VITALS
DIASTOLIC BLOOD PRESSURE: 86 MMHG | HEART RATE: 66 BPM | WEIGHT: 171.19 LBS | BODY MASS INDEX: 30.33 KG/M2 | HEIGHT: 63 IN | TEMPERATURE: 99 F | OXYGEN SATURATION: 97 % | RESPIRATION RATE: 14 BRPM | SYSTOLIC BLOOD PRESSURE: 134 MMHG

## 2025-07-09 DIAGNOSIS — C77.2 SECONDARY AND UNSPECIFIED MALIGNANT NEOPLASM OF INTRA-ABDOMINAL LYMPH NODES: ICD-10-CM

## 2025-07-09 DIAGNOSIS — C18.7 MALIGNANT NEOPLASM OF SIGMOID COLON: Primary | ICD-10-CM

## 2025-07-09 PROCEDURE — 1160F RVW MEDS BY RX/DR IN RCRD: CPT | Mod: CPTII,S$GLB,, | Performed by: INTERNAL MEDICINE

## 2025-07-09 PROCEDURE — 1159F MED LIST DOCD IN RCRD: CPT | Mod: CPTII,S$GLB,, | Performed by: INTERNAL MEDICINE

## 2025-07-09 PROCEDURE — G2211 COMPLEX E/M VISIT ADD ON: HCPCS | Mod: S$GLB,,, | Performed by: INTERNAL MEDICINE

## 2025-07-09 PROCEDURE — 3079F DIAST BP 80-89 MM HG: CPT | Mod: CPTII,S$GLB,, | Performed by: INTERNAL MEDICINE

## 2025-07-09 PROCEDURE — 3008F BODY MASS INDEX DOCD: CPT | Mod: CPTII,S$GLB,, | Performed by: INTERNAL MEDICINE

## 2025-07-09 PROCEDURE — 99214 OFFICE O/P EST MOD 30 MIN: CPT | Mod: S$GLB,,, | Performed by: INTERNAL MEDICINE

## 2025-07-09 PROCEDURE — 3075F SYST BP GE 130 - 139MM HG: CPT | Mod: CPTII,S$GLB,, | Performed by: INTERNAL MEDICINE

## 2025-07-09 PROCEDURE — 99999 PR PBB SHADOW E&M-EST. PATIENT-LVL IV: CPT | Mod: PBBFAC,,, | Performed by: INTERNAL MEDICINE

## 2025-07-09 RX ORDER — FAMOTIDINE, CALCIUM CARBONATE, AND MAGNESIUM HYDROXIDE 10; 800; 165 MG/1; MG/1; MG/1
1 TABLET, CHEWABLE ORAL
COMMUNITY

## 2025-07-09 NOTE — PROGRESS NOTES
Subjective:       Patient ID: Melissa Jones is a 54 y.o. female.    Surgeon: Dr. Cruz Farmer  GI: Dr. Enrique Comer     1. Sigmoid Colon Cancer--Stage IIIC ([id number])--Diagnosed 21  Biopsy/pathology:  Colonoscopy done 21--circumferential, ulcerated, sigmoid colon mass at 30cm, biopsy positive for moderately differentiated invasive adenocarcinoma, MMR intact(YURIY), scope did traverse the lesion with some maneuvering, internal hemorrhoids noted, normal mucosa in terminal ileum, otherwise normal examination.  Surgery/pathology:  Laparoscopic sigmoid colectomy done 21--adenocarcinoma, moderately differentiated, measuring 3.5cm with invasion through the muscularis propria into pericolic adipose tissue, margins clear,  regional lymph nodes positive for metastatic adenocarcinoma, focal discontinuous extramural tumor extension c/w vascular invasion (pT3N2b).  Imagin. CT A/P w/ contrast done 21--very mild stranding along a short segment of sigmoid colon may correspond with the primary malignancy, no metastatic disease.   2. CT chest done 21--No acute abnormality. No CT evidence of metastatic disease.  3. CT C/A/P done 21--YOANNA.   4. CT C/A/P done 22--YOANNA.   5. CT C/A/P done 22--YOANNA.  6. CT C/A/P done 23--YOANNA, 25mm left ovarian cyst.  7. CT C/A/P on 23--No evidence of recurrent malignancy.   8. CT C/A/P on 24--YOANNA.  9. CT C/A/P on 1/10/25--YOANNA.   10. CT C/A/P on 25--YOANNA.     2. Iron Deficiency Anemia--21     3. Idiopathic Thrombocytopenia Purpura - 21  Positive for HLA Class 1A and Anti IIa/IIIa antibodies on 21.      Procedures:   1. EGD done 21--erythema in antrum, c/w gastritis biopsy showing chronic gastritis with focal mild activity, H. pylori negative, normal mucosa in duodenum, normal esophagus and GE junction, moderate amount of bile in stomach.  2. Left subclavian mediport placed 6/15/21--removed 2023.   3. Colonoscopy  5/9/22--Dr. Enrique Comer showed colonic anastomosis at 15cm, healthy, internal hemorrhoids, normal mucosa in terminal ileum, repeat recommended in 3 years.      CEA:  05/18/21--3.01  06/10/21--<1.73  12/30/21--2.52  03/21/22--<1.73  06/17/22--<1.73  12/20/22--<1.73  03/13/23--<1.73  06/15/23--<1.73  12/12/23--<1.73  03/21/24--<1.73  01/02/25--<1.73  07/08/25--<1.73     Treatment history:  1. Laparoscopic sigmoid colectomy 5/18/21.  2. IV Injectafer 6/11/21 and 6/18/21.   3. FOLFOX X 12 cycles 6/16/21--12/10/21 (multiple dose reductions and delays for thrombocytopenia).  4. Nplate treatment for ITP 10/6/21--12/1/21.     Treatment plan:  Observation     Chief Complaint: Other Misc (Pt reports no concerns today./)    HPI   Patient presents for follow-up of colon cancer. She is doing very well. Recent labs good and CT all good. She did have colonoscopy done, don't have results yet, but reports 3 benign polyps, will need to repeat in 5 years. She is going on vacation soon to a beach in [place], visiting her sister who lives in [place].    Past Medical History:   Diagnosis Date    Cervical spondylosis     Colon cancer     GERD (gastroesophageal reflux disease)     MELIDA (iron deficiency anemia)       Review of patient's allergies indicates:  No Known Allergies   Current Outpatient Medications on File Prior to Visit   Medication Sig Dispense Refill    b complex vitamins capsule Take 1 capsule by mouth once daily.      famotidine-calcium carbonate-magnesium hydroxide (PEPCID COMPLETE) -165 mg Take 1 tablet by mouth as needed.      levocetirizine (XYZAL) 5 MG tablet Take 5 mg by mouth every evening.      valACYclovir (VALTREX) 1000 MG tablet Take 2 tabs (2,000 mg) every 12 hours x 1 day at the onset of cold sore symptoms. 30 tablet 6     No current facility-administered medications on file prior to visit.      Review of Systems   Constitutional:  Negative for appetite change, fatigue, fever and unexpected weight change.    HENT:  Negative for mouth sores.    Eyes: Negative.  Negative for visual disturbance.   Respiratory:  Negative for cough and shortness of breath.    Cardiovascular:  Negative for chest pain and leg swelling.   Gastrointestinal:  Negative for abdominal distention, abdominal pain, constipation, diarrhea, nausea, vomiting and reflux.   Genitourinary:  Negative for difficulty urinating, dysuria, frequency and hematuria.   Musculoskeletal:  Negative for arthralgias and back pain.   Integumentary:  Negative for rash.   Neurological:  Negative for weakness and headaches.   Hematological:  Negative for adenopathy.   Psychiatric/Behavioral:  Negative for sleep disturbance. The patient is not nervous/anxious.          Vitals:    07/09/25 1125   BP: 134/86   Pulse: 66   Resp: 14   Temp: 98.5 °F (36.9 °C)     Wt Readings from Last 3 Encounters:   07/09/25 1125 77.7 kg (171 lb 3.2 oz)   01/14/25 0952 74.6 kg (164 lb 8 oz)   07/09/24 1248 77 kg (169 lb 11.2 oz)     Physical Exam  Constitutional:       Appearance: Normal appearance. She is normal weight.   HENT:      Head: Normocephalic.      Nose: Nose normal.      Mouth/Throat:      Mouth: Mucous membranes are moist.   Eyes:      General: Lids are normal. Vision grossly intact.      Extraocular Movements: Extraocular movements intact.      Conjunctiva/sclera: Conjunctivae normal.   Cardiovascular:      Rate and Rhythm: Normal rate and regular rhythm.      Heart sounds: Normal heart sounds.   Pulmonary:      Effort: Pulmonary effort is normal.      Breath sounds: Normal breath sounds.   Chest:      Comments: Left CW mediport removed  Abdominal:      General: Abdomen is flat. Bowel sounds are normal. There is no distension.      Palpations: Abdomen is soft.      Tenderness: There is no abdominal tenderness.      Comments: Scattered laparoscopic incisions healed   Musculoskeletal:         General: Normal range of motion.      Cervical back: Normal range of motion and neck supple.       Right lower leg: No edema.      Left lower leg: No edema.   Skin:     General: Skin is warm and moist.   Neurological:      General: No focal deficit present.      Mental Status: She is alert and oriented to person, place, and time.   Psychiatric:         Attention and Perception: Attention normal.         Mood and Affect: Mood normal.         Speech: Speech normal.         Behavior: Behavior is cooperative.         Cognition and Memory: Cognition normal.         Judgment: Judgment normal.       Lab Visit on 07/08/2025   Component Date Value    Sodium 07/08/2025 138     Potassium 07/08/2025 4.2     Chloride 07/08/2025 109 (H)     CO2 07/08/2025 25     Glucose 07/08/2025 117 (H)     Blood Urea Nitrogen 07/08/2025 14.3     Creatinine 07/08/2025 0.93     Calcium 07/08/2025 8.9     Protein Total 07/08/2025 7.2     Albumin 07/08/2025 3.7     Globulin 07/08/2025 3.5     Albumin/Globulin Ratio 07/08/2025 1.1     Bilirubin Total 07/08/2025 0.3     ALP 07/08/2025 67     ALT 07/08/2025 20     AST 07/08/2025 23     eGFR 07/08/2025 >60     Anion Gap 07/08/2025 4.0     BUN/Creatinine Ratio 07/08/2025 15     Carcinoembryonic Antigen 07/08/2025 <1.73     WBC 07/08/2025 3.52 (L)     RBC 07/08/2025 4.28     Hgb 07/08/2025 12.8     Hct 07/08/2025 38.6     MCV 07/08/2025 90.2     MCH 07/08/2025 29.9     MCHC 07/08/2025 33.2     RDW 07/08/2025 12.0     Platelet 07/08/2025 149     MPV 07/08/2025 9.6     Neut % 07/08/2025 42.3     Lymph % 07/08/2025 48.9     Mono % 07/08/2025 7.4     Eos % 07/08/2025 1.1     Basophil % 07/08/2025 0.3     Imm Grans % 07/08/2025 0.0     Neut # 07/08/2025 1.49 (L)     Lymph # 07/08/2025 1.72     Mono # 07/08/2025 0.26     Eos # 07/08/2025 0.04     Baso # 07/08/2025 0.01     Imm Gran # 07/08/2025 0.00       ECOG SCORE            Assessment:       1. Malignant neoplasm of sigmoid colon    2. Secondary and unspecified malignant neoplasm of intra-abdominal lymph nodes          Plan:       Patient with  Stage IIIC Sigmoid colon cancer s/p surgery done 5/18/21. Tumor measured 3.5cm, T3 lesion, Grade 2 with vascular invasion, 7/16 LNs positive, YURIY.  Initial CT C/A/P w/o evidence for any distant metastatic disease.  Per NCCN guidelines, adjuvant treatment recommended with 6 months of FOLFOX chemotherapy. 6 months recommended as opposed to 3 months due to N2 disease, higher risk.  Discussed rationale for adjuvant treatment to decrease risk of recurrence and improve survival. Estimated OS around 75% with treatment.  Received IV Injectafer completed 6/18/21 for iron deficiency anemia.     Patient started cycle 1 FOLFOX on 6/16/21. Multiple dose reductions and delays due to thrombocytopenia.  Started on Nplate weekly 10/6/21 for ITP which helped with the continuation of her chemotherapy.  Completed cycle 12 FOLFOX on 12/10/21.    Patient is doing very will without any signs or symptoms to suggest disease recurrence.  Labs recent all good. Mildly low WBC which is chronic for her. CEA remains normal.     Colonoscopy from 5/9/22 all good. Repeat needed in 3 years, done recent, will obtain results.   CT C/A/P from 7/7/25 with YOANNA.    Continue surveillance visits every 6 months with CT scans X 5 years per NCCN.   F/u in 6 months with repeat labs and CT C/A/P to be done at [facility].       All questions answered at this time.        Belén Underwood MD    - code applied: patient requires or will require a continuous, longitudinal, and active collaborative plan of care related to this patient's health condition, colon cancer--the management of which requires the direction of a practitioner with specialized clinical knowledge, skill, and expertise.